# Patient Record
Sex: MALE | Race: WHITE | HISPANIC OR LATINO | URBAN - METROPOLITAN AREA
[De-identification: names, ages, dates, MRNs, and addresses within clinical notes are randomized per-mention and may not be internally consistent; named-entity substitution may affect disease eponyms.]

---

## 2017-10-31 ENCOUNTER — APPOINTMENT (RX ONLY)
Dept: URBAN - METROPOLITAN AREA CLINIC 89 | Facility: CLINIC | Age: 22
Setting detail: DERMATOLOGY
End: 2017-10-31

## 2017-10-31 DIAGNOSIS — R21 RASH AND OTHER NONSPECIFIC SKIN ERUPTION: ICD-10-CM

## 2017-10-31 PROCEDURE — ? COUNSELING

## 2017-10-31 PROCEDURE — 99202 OFFICE O/P NEW SF 15 MIN: CPT

## 2017-10-31 PROCEDURE — ? PRESCRIPTION

## 2017-10-31 PROCEDURE — ? TREATMENT REGIMEN

## 2017-10-31 PROCEDURE — ? SEPARATE AND IDENTIFIABLE DOCUMENTATION

## 2017-10-31 PROCEDURE — ? DEFER

## 2017-10-31 RX ORDER — DOXYCYCLINE 100 MG/1
CAPSULE ORAL
Qty: 60 | Refills: 0 | Status: ERX | COMMUNITY
Start: 2017-10-31

## 2017-10-31 RX ADMIN — DOXYCYCLINE: 100 CAPSULE ORAL at 00:00

## 2017-10-31 ASSESSMENT — LOCATION DETAILED DESCRIPTION DERM: LOCATION DETAILED: LEFT MEDIAL FRONTAL SCALP

## 2017-10-31 ASSESSMENT — LOCATION SIMPLE DESCRIPTION DERM: LOCATION SIMPLE: LEFT SCALP

## 2017-10-31 ASSESSMENT — LOCATION ZONE DERM: LOCATION ZONE: SCALP

## 2017-10-31 NOTE — PROCEDURE: DEFER
Detail Level: Detailed
Instructions (Optional): Vertex, 4mm punch, Firm to Fluctuant Spongy Nodule, R/O Dissecting Cellulitis of Scalp vs Acne Keloidalis Nuchae vs Kerion\\nFrontal Lobe, 4mm punch, Firm to Fluctuant Spongy Nodule, R/O Dissecting Cellulitis of Scalp vs Acne Keloidalis Nuchae vs Kerion, USE TRANSVERSE SECTIONING
Procedure To Be Performed At Next Visit: Biopsy by punch method

## 2017-11-14 ENCOUNTER — APPOINTMENT (RX ONLY)
Dept: URBAN - METROPOLITAN AREA CLINIC 89 | Facility: CLINIC | Age: 22
Setting detail: DERMATOLOGY
End: 2017-11-14

## 2017-11-14 DIAGNOSIS — D485 NEOPLASM OF UNCERTAIN BEHAVIOR OF SKIN: ICD-10-CM

## 2017-11-14 PROBLEM — D48.5 NEOPLASM OF UNCERTAIN BEHAVIOR OF SKIN: Status: ACTIVE | Noted: 2017-11-14

## 2017-11-14 PROCEDURE — ? COUNSELING

## 2017-11-14 PROCEDURE — 11100: CPT

## 2017-11-14 PROCEDURE — 99213 OFFICE O/P EST LOW 20 MIN: CPT | Mod: 25

## 2017-11-14 PROCEDURE — 11101: CPT

## 2017-11-14 PROCEDURE — ? SEPARATE AND IDENTIFIABLE DOCUMENTATION

## 2017-11-14 PROCEDURE — ? BIOPSY BY PUNCH METHOD

## 2017-11-14 ASSESSMENT — LOCATION DETAILED DESCRIPTION DERM
LOCATION DETAILED: POSTERIOR MID-PARIETAL SCALP
LOCATION DETAILED: LEFT SUPERIOR PARIETAL SCALP

## 2017-11-14 ASSESSMENT — LOCATION SIMPLE DESCRIPTION DERM
LOCATION SIMPLE: POSTERIOR SCALP
LOCATION SIMPLE: SCALP

## 2017-11-14 ASSESSMENT — PAIN INTENSITY VAS: HOW INTENSE IS YOUR PAIN 0 BEING NO PAIN, 10 BEING THE MOST SEVERE PAIN POSSIBLE?: NO PAIN

## 2017-11-14 ASSESSMENT — LOCATION ZONE DERM: LOCATION ZONE: SCALP

## 2017-11-14 NOTE — PROCEDURE: BIOPSY BY PUNCH METHOD
Patient Will Remove Sutures At Home?: No
Anesthesia Type: 2% lidocaine without epinephrine
Anesthesia Volume In Cc (Will Not Render If 0): 0.5
Size Of Lesion In Cm (Optional): 0
Suture Removal: 14 days
Post-Care Instructions: I reviewed with the patient in detail post-care instructions. Patient is to keep the biopsy site dry overnight, and then apply bacitracin twice daily until healed. Patient may apply hydrogen peroxide soaks to remove any crusting.
Lab Facility: [address] [place] [address]
Hemostasis: None
Consent: Written consent was obtained and risks were reviewed including but not limited to scarring, infection, bleeding, scabbing, incomplete removal, nerve damage and allergy to anesthesia.
Lab Facility: [id number]
Wound Care: Bacitracin
Epidermal Sutures: 5-0 Nylon
Notification Instructions: Patient will be notified of biopsy results. However, patient instructed to call the office if not contacted within 2 weeks.
Punch Size In Mm: 4
Billing Type: [employer]
Biopsy Type: H and E
Home Suture Removal Text: Patient was provided a home suture removal kit and will remove their sutures at home. If they have any questions or difficulties they will call the office.
Path Notes (To The Dermatopathologist): Firm to Fluctuant Spongy Nodule; R/O- Dissecting cellulitis of Scalp vs Acne Keloidalis Nuchae vs Kerion, USE TRANSVERSE SECTIONING, SIZE: 4mm
Lab: [id number]
Body Location Override (Optional - Billing Will Still Be Based On Selected Body Map Location If Applicable): Vertex
Body Location Override (Optional - Billing Will Still Be Based On Selected Body Map Location If Applicable): Frontal Lobe
Detail Level: Detailed
Home Suture Removal Text: Patient was provided a home suture removal kit and will remove their sutures at home.  If they have any questions or difficulties they will call the office.
Dressing: bandage
Lab: [id number]
Billing Type: Third-Party Bill
Path Notes (To The Dermatopathologist): Firm to Fluctuant Spongy Nodule; R/O- Dissecting cellulitis of Scalp vs Acne Keloidalis Nuchae vs Kerion, SIZE: 4mm

## 2017-11-14 NOTE — PROCEDURE: MIPS QUALITY
Detail Level: Detailed
Quality 431: Preventive Care And Screening: Unhealthy Alcohol Use - Screening: Patient screened for unhealthy alcohol use using a single question and scores less than 2 times per year
Quality 226: Preventive Care And Screening: Tobacco Use: Screening And Cessation Intervention: Patient screened for tobacco and never smoked
Quality 130: Documentation Of Current Medications In The Medical Record: Current Medications Documented
Quality 110: Preventive Care And Screening: Influenza Immunization: Influenza Immunization not Administered for Documented Reasons.

## 2017-11-29 ENCOUNTER — APPOINTMENT (RX ONLY)
Dept: URBAN - METROPOLITAN AREA CLINIC 89 | Facility: CLINIC | Age: 22
Setting detail: DERMATOLOGY
End: 2017-11-29

## 2017-11-29 ENCOUNTER — RX ONLY (OUTPATIENT)
Age: 22
Setting detail: RX ONLY
End: 2017-11-29

## 2017-11-29 DIAGNOSIS — L66.89 OTHER CICATRICIAL ALOPECIA: ICD-10-CM | Status: STABLE

## 2017-11-29 DIAGNOSIS — L66.8 OTHER CICATRICIAL ALOPECIA: ICD-10-CM | Status: STABLE

## 2017-11-29 PROCEDURE — ? TREATMENT REGIMEN

## 2017-11-29 PROCEDURE — ? INTRALESIONAL KENALOG

## 2017-11-29 PROCEDURE — ? COUNSELING

## 2017-11-29 PROCEDURE — 99213 OFFICE O/P EST LOW 20 MIN: CPT | Mod: 25

## 2017-11-29 PROCEDURE — ? PATHOLOGY DISCUSSION

## 2017-11-29 PROCEDURE — 11900 INJECT SKIN LESIONS </W 7: CPT

## 2017-11-29 PROCEDURE — ? SEPARATE AND IDENTIFIABLE DOCUMENTATION

## 2017-11-29 RX ORDER — DOXYCYCLINE 100 MG/1
CAPSULE ORAL
Qty: 60 | Refills: 1 | Status: ERX

## 2017-11-29 ASSESSMENT — LOCATION SIMPLE DESCRIPTION DERM
LOCATION SIMPLE: POSTERIOR SCALP
LOCATION SIMPLE: SCALP

## 2017-11-29 ASSESSMENT — LOCATION ZONE DERM: LOCATION ZONE: SCALP

## 2017-11-29 ASSESSMENT — LOCATION DETAILED DESCRIPTION DERM
LOCATION DETAILED: LEFT SUPERIOR PARIETAL SCALP
LOCATION DETAILED: RIGHT SUPERIOR PARIETAL SCALP
LOCATION DETAILED: POSTERIOR MID-PARIETAL SCALP

## 2017-11-29 NOTE — PROCEDURE: INTRALESIONAL KENALOG
Total Volume Injected (Ccs- Only Use Numbers And Decimals): 1
Include Z78.9 (Other Specified Conditions Influencing Health Status) As An Associated Diagnosis?: No
Expiration Date (Optional): 01/2019
Medical Necessity Clause: This procedure was medically necessary because the lesions that were treated were:
Detail Level: Detailed
X Size Of Lesion In Cm (Optional): 0
Kenalog Preparation: Kenalog
Concentration Of Solution Injected (Mg/Ml): 10.0
Administered By (Optional): Avelino Moreno
Lot # (Optional): [id number]
Consent: The risks of atrophy were reviewed with the patient.

## 2017-11-30 ENCOUNTER — RX ONLY (OUTPATIENT)
Age: 22
Setting detail: RX ONLY
End: 2017-11-30

## 2017-11-30 RX ORDER — CHLORHEXIDINE GLUCONATE 2 G/100ML
SOLUTION TOPICAL
Qty: 1 | Refills: 2 | Status: ERX | COMMUNITY
Start: 2017-11-30

## 2018-01-02 ENCOUNTER — APPOINTMENT (RX ONLY)
Dept: URBAN - METROPOLITAN AREA CLINIC 89 | Facility: CLINIC | Age: 23
Setting detail: DERMATOLOGY
End: 2018-01-02

## 2018-01-02 DIAGNOSIS — L66.8 OTHER CICATRICIAL ALOPECIA: ICD-10-CM

## 2018-01-02 DIAGNOSIS — L70.0 ACNE VULGARIS: ICD-10-CM

## 2018-01-02 DIAGNOSIS — L66.89 OTHER CICATRICIAL ALOPECIA: ICD-10-CM

## 2018-01-02 PROCEDURE — 11901 INJECT SKIN LESIONS >7: CPT

## 2018-01-02 PROCEDURE — ? SEPARATE AND IDENTIFIABLE DOCUMENTATION

## 2018-01-02 PROCEDURE — ? INTRALESIONAL KENALOG

## 2018-01-02 PROCEDURE — ? PRESCRIPTION

## 2018-01-02 PROCEDURE — ? COUNSELING

## 2018-01-02 PROCEDURE — 99213 OFFICE O/P EST LOW 20 MIN: CPT | Mod: 25

## 2018-01-02 RX ORDER — CLINDAMYCIN PHOSPHATE 10 MG/ML
SOLUTION TOPICAL
Qty: 1 | Refills: 1 | Status: ERX | COMMUNITY
Start: 2018-01-02

## 2018-01-02 RX ORDER — DOXYCYCLINE 100 MG/1
CAPSULE ORAL BID
Qty: 60 | Refills: 0 | Status: ERX

## 2018-01-02 RX ORDER — BENZOYL PEROXIDE 50 MG/ML
LIQUID TOPICAL
Qty: 1 | Refills: 1 | Status: ERX | COMMUNITY
Start: 2018-01-02

## 2018-01-02 RX ADMIN — BENZOYL PEROXIDE: 50 LIQUID TOPICAL at 00:00

## 2018-01-02 RX ADMIN — CLINDAMYCIN PHOSPHATE: 10 SOLUTION TOPICAL at 00:00

## 2018-01-02 ASSESSMENT — LOCATION DETAILED DESCRIPTION DERM
LOCATION DETAILED: LEFT CENTRAL PARIETAL SCALP
LOCATION DETAILED: LEFT CENTRAL MALAR CHEEK
LOCATION DETAILED: LEFT CENTRAL FRONTAL SCALP
LOCATION DETAILED: RIGHT MEDIAL FOREHEAD
LOCATION DETAILED: LEFT CENTRAL FRONTAL SCALP
LOCATION DETAILED: RIGHT SUPERIOR PARIETAL SCALP
LOCATION DETAILED: RIGHT LOWER CUTANEOUS LIP
LOCATION DETAILED: LEFT SUPERIOR PARIETAL SCALP
LOCATION DETAILED: RIGHT CENTRAL MALAR CHEEK
LOCATION DETAILED: RIGHT SUPERIOR PARIETAL SCALP
LOCATION DETAILED: LEFT SUPERIOR PARIETAL SCALP
LOCATION DETAILED: LEFT MEDIAL FRONTAL SCALP
LOCATION DETAILED: RIGHT CENTRAL FRONTAL SCALP

## 2018-01-02 ASSESSMENT — LOCATION ZONE DERM
LOCATION ZONE: SCALP
LOCATION ZONE: LIP
LOCATION ZONE: FACE
LOCATION ZONE: SCALP

## 2018-01-02 ASSESSMENT — LOCATION SIMPLE DESCRIPTION DERM
LOCATION SIMPLE: RIGHT LIP
LOCATION SIMPLE: LEFT SCALP
LOCATION SIMPLE: SCALP
LOCATION SIMPLE: RIGHT FOREHEAD
LOCATION SIMPLE: LEFT CHEEK
LOCATION SIMPLE: LEFT SCALP
LOCATION SIMPLE: SCALP
LOCATION SIMPLE: RIGHT SCALP
LOCATION SIMPLE: RIGHT CHEEK

## 2018-01-02 NOTE — PROCEDURE: INTRALESIONAL KENALOG
Administered By (Optional): Robert Bob
Total Volume Injected (Ccs- Only Use Numbers And Decimals): 1.0
X Size Of Lesion In Cm (Optional): 0
Detail Level: Detailed
Medical Necessity Clause: This procedure was medically necessary because the lesions that were treated were:
Concentration Of Solution Injected (Mg/Ml): 10.0
Lot # (Optional): [id number]
Include Z78.9 (Other Specified Conditions Influencing Health Status) As An Associated Diagnosis?: No
Expiration Date (Optional): 02/2019
Kenalog Preparation: Kenalog
Consent: The risks of atrophy were reviewed with the patient.

## 2018-02-14 ENCOUNTER — APPOINTMENT (RX ONLY)
Dept: URBAN - METROPOLITAN AREA CLINIC 89 | Facility: CLINIC | Age: 23
Setting detail: DERMATOLOGY
End: 2018-02-14

## 2018-02-14 ENCOUNTER — RX ONLY (OUTPATIENT)
Age: 23
Setting detail: RX ONLY
End: 2018-02-14

## 2018-02-14 DIAGNOSIS — L66.8 OTHER CICATRICIAL ALOPECIA: ICD-10-CM | Status: IMPROVED

## 2018-02-14 DIAGNOSIS — L70.0 ACNE VULGARIS: ICD-10-CM

## 2018-02-14 DIAGNOSIS — L66.89 OTHER CICATRICIAL ALOPECIA: ICD-10-CM | Status: IMPROVED

## 2018-02-14 PROCEDURE — 11901 INJECT SKIN LESIONS >7: CPT

## 2018-02-14 PROCEDURE — ? TREATMENT REGIMEN

## 2018-02-14 PROCEDURE — ? INTRALESIONAL KENALOG

## 2018-02-14 PROCEDURE — ? COUNSELING

## 2018-02-14 PROCEDURE — ? SEPARATE AND IDENTIFIABLE DOCUMENTATION

## 2018-02-14 PROCEDURE — 99213 OFFICE O/P EST LOW 20 MIN: CPT | Mod: 25

## 2018-02-14 RX ORDER — DOXYCYCLINE 100 MG/1
CAPSULE ORAL BID
Qty: 60 | Refills: 1 | Status: ERX

## 2018-02-14 ASSESSMENT — LOCATION SIMPLE DESCRIPTION DERM
LOCATION SIMPLE: RIGHT FOREHEAD
LOCATION SIMPLE: RIGHT SCALP
LOCATION SIMPLE: LEFT CHEEK
LOCATION SIMPLE: SCALP
LOCATION SIMPLE: POSTERIOR SCALP
LOCATION SIMPLE: SCALP
LOCATION SIMPLE: RIGHT CHEEK
LOCATION SIMPLE: CHIN

## 2018-02-14 ASSESSMENT — LOCATION DETAILED DESCRIPTION DERM
LOCATION DETAILED: RIGHT MEDIAL FOREHEAD
LOCATION DETAILED: LEFT SUPERIOR PARIETAL SCALP
LOCATION DETAILED: LEFT CENTRAL PARIETAL SCALP
LOCATION DETAILED: RIGHT SUPERIOR PARIETAL SCALP
LOCATION DETAILED: LEFT CENTRAL MALAR CHEEK
LOCATION DETAILED: RIGHT CENTRAL FRONTAL SCALP
LOCATION DETAILED: RIGHT INFERIOR CENTRAL MALAR CHEEK
LOCATION DETAILED: LEFT SUPERIOR PARIETAL SCALP
LOCATION DETAILED: RIGHT CENTRAL PARIETAL SCALP
LOCATION DETAILED: POSTERIOR MID-PARIETAL SCALP
LOCATION DETAILED: LEFT CENTRAL PARIETAL SCALP
LOCATION DETAILED: RIGHT CHIN

## 2018-02-14 ASSESSMENT — LOCATION ZONE DERM
LOCATION ZONE: SCALP
LOCATION ZONE: FACE
LOCATION ZONE: SCALP

## 2018-02-14 NOTE — PROCEDURE: TREATMENT REGIMEN
Continue Regimen: Clindamycin 1% topical solution \\nBenzoyl peroxide 5%
Detail Level: Zone
Initiate Treatment: Doxycycline 100mg BID

## 2018-02-14 NOTE — PROCEDURE: COUNSELING
Topical Clindamycin Pregnancy And Lactation Text: This medication is Pregnancy Category B and is considered safe during pregnancy. It is unknown if it is excreted in breast milk.
Isotretinoin Counseling: Patient should get monthly blood tests, not donate blood, not drive at night if vision affected, not share medication, and not undergo elective surgery for 6 months after tx completed. Side effects reviewed, pt to contact office should one occur.
Detail Level: Detailed
Dapsone Counseling: I discussed with the patient the risks of dapsone including but not limited to hemolytic anemia, agranulocytosis, rashes, methemoglobinemia, kidney failure, peripheral neuropathy, headaches, GI upset, and liver toxicity. Patients who start dapsone require monitoring including baseline LFTs and weekly CBCs for the first month, then every month thereafter. The patient verbalized understanding of the proper use and possible adverse effects of dapsone. All of the patient's questions and concerns were addressed.
Birth Control Pills Pregnancy And Lactation Text: This medication should be avoided if pregnant and for the first 30 days post-partum.
Azithromycin Counseling:  I discussed with the patient the risks of azithromycin including but not limited to GI upset, allergic reaction, drug rash, diarrhea, and yeast infections.
High Dose Vitamin A Counseling: Side effects reviewed, pt to contact office should one occur.
Spironolactone Pregnancy And Lactation Text: This medication can cause feminization of the male fetus and should be avoided during pregnancy. The active metabolite is also found in breast milk.
Bactrim Pregnancy And Lactation Text: This medication is Pregnancy Category D and is known to cause fetal risk. It is also excreted in breast milk.
Benzoyl Peroxide Pregnancy And Lactation Text: This medication is Pregnancy Category C. It is unknown if benzoyl peroxide is excreted in breast milk.
Benzoyl Peroxide Counseling: Patient counseled that medicine may cause skin irritation and bleach clothing. In the event of skin irritation, the patient was advised to reduce the amount of the drug applied or use it less frequently. The patient verbalized understanding of the proper use and possible adverse effects of benzoyl peroxide. All of the patient's questions and concerns were addressed.
Tetracycline Pregnancy And Lactation Text: This medication is Pregnancy Category D and not consider safe during pregnancy. It is also excreted in breast milk.
Birth Control Pills Counseling: Birth Control Pill Counseling: I discussed with the patient the potential side effects of OCPs including but not limited to increased risk of stroke, heart attack, thrombophlebitis, deep venous thrombosis, hepatic adenomas, breast changes, GI upset, headaches, and depression. The patient verbalized understanding of the proper use and possible adverse effects of OCPs. All of the patient's questions and concerns were addressed.
Topical Retinoid Pregnancy And Lactation Text: This medication is Pregnancy Category C. It is unknown if this medication is excreted in breast milk.
Topical Sulfur Applications Counseling: Topical Sulfur Counseling: Patient counseled that this medication may cause skin irritation or allergic reactions. In the event of skin irritation, the patient was advised to reduce the amount of the drug applied or use it less frequently. The patient verbalized understanding of the proper use and possible adverse effects of topical sulfur application. All of the patient's questions and concerns were addressed.
Erythromycin Pregnancy And Lactation Text: This medication is Pregnancy Category B and is considered safe during pregnancy. It is also excreted in breast milk.
Doxycycline Counseling:  Patient counseled regarding possible photosensitivity and increased risk for sunburn. Patient instructed to avoid sunlight, if possible. When exposed to sunlight, patients should wear protective clothing, sunglasses, and sunscreen. The patient was instructed to call the office immediately if the following severe adverse effects occur:  hearing changes, easy bruising/bleeding, severe headache, or vision changes. The patient verbalized understanding of the proper use and possible adverse effects of doxycycline. All of the patient's questions and concerns were addressed.
Minocycline Counseling: Patient advised regarding possible photosensitivity and discoloration of the teeth, skin, lips, tongue and gums. Patient instructed to avoid sunlight, if possible. When exposed to sunlight, patients should wear protective clothing, sunglasses, and sunscreen. The patient was instructed to call the office immediately if the following severe adverse effects occur:  hearing changes, easy bruising/bleeding, severe headache, or vision changes. The patient verbalized understanding of the proper use and possible adverse effects of minocycline. All of the patient's questions and concerns were addressed.
Tazorac Counseling:  Patient advised that medication is irritating and drying. Patient may need to apply sparingly and wash off after an hour before eventually leaving it on overnight. The patient verbalized understanding of the proper use and possible adverse effects of tazorac. All of the patient's questions and concerns were addressed.
Tetracycline Counseling: Patient counseled regarding possible photosensitivity and increased risk for sunburn. Patient instructed to avoid sunlight, if possible. When exposed to sunlight, patients should wear protective clothing, sunglasses, and sunscreen. The patient was instructed to call the office immediately if the following severe adverse effects occur:  hearing changes, easy bruising/bleeding, severe headache, or vision changes. The patient verbalized understanding of the proper use and possible adverse effects of tetracycline. All of the patient's questions and concerns were addressed. Patient understands to avoid pregnancy while on therapy due to potential birth defects.
Spironolactone Counseling: Patient advised regarding risks of diarrhea, abdominal pain, hyperkalemia, birth defects (for female patients), liver toxicity and renal toxicity. The patient may need blood work to monitor liver and kidney function and potassium levels while on therapy. The patient verbalized understanding of the proper use and possible adverse effects of spironolactone. All of the patient's questions and concerns were addressed.
Bactrim Counseling:  I discussed with the patient the risks of sulfa antibiotics including but not limited to GI upset, allergic reaction, drug rash, diarrhea, dizziness, photosensitivity, and yeast infections. Rarely, more serious reactions can occur including but not limited to aplastic anemia, agranulocytosis, methemoglobinemia, blood dyscrasias, liver or kidney failure, lung infiltrates or desquamative/blistering drug rashes.
Include Pregnancy/Lactation Warning?: No
Topical Retinoid counseling:  Patient advised to apply a pea-sized amount only at bedtime and wait 30 minutes after washing their face before applying. If too drying, patient may add a non-comedogenic moisturizer. The patient verbalized understanding of the proper use and possible adverse effects of retinoids. All of the patient's questions and concerns were addressed.
High Dose Vitamin A Pregnancy And Lactation Text: High dose vitamin A therapy is contraindicated during pregnancy and breast feeding.
Tazorac Pregnancy And Lactation Text: This medication is not safe during pregnancy. It is unknown if this medication is excreted in breast milk.
Topical Sulfur Applications Pregnancy And Lactation Text: This medication is Pregnancy Category C and has an unknown safety profile during pregnancy. It is unknown if this topical medication is excreted in breast milk.
Dapsone Pregnancy And Lactation Text: This medication is Pregnancy Category C and is not considered safe during pregnancy or breast feeding.
Isotretinoin Pregnancy And Lactation Text: This medication is Pregnancy Category X and is considered extremely dangerous during pregnancy. It is unknown if it is excreted in breast milk.
Doxycycline Pregnancy And Lactation Text: This medication is Pregnancy Category D and not consider safe during pregnancy. It is also excreted in breast milk but is considered safe for shorter treatment courses.
Topical Clindamycin Counseling: Patient counseled that this medication may cause skin irritation or allergic reactions. In the event of skin irritation, the patient was advised to reduce the amount of the drug applied or use it less frequently. The patient verbalized understanding of the proper use and possible adverse effects of clindamycin. All of the patient's questions and concerns were addressed.
Erythromycin Counseling:  I discussed with the patient the risks of erythromycin including but not limited to GI upset, allergic reaction, drug rash, diarrhea, increase in liver enzymes, and yeast infections.
Azithromycin Pregnancy And Lactation Text: This medication is considered safe during pregnancy and is also secreted in breast milk.

## 2018-02-14 NOTE — PROCEDURE: INTRALESIONAL KENALOG
Kenalog Preparation: Kenalog
Include Z78.9 (Other Specified Conditions Influencing Health Status) As An Associated Diagnosis?: No
Medical Necessity Clause: This procedure was medically necessary because the lesions that were treated were:
Concentration Of Solution Injected (Mg/Ml): 10.0
Detail Level: Detailed
Total Volume Injected (Ccs- Only Use Numbers And Decimals): 1.0
Lot # (Optional): [id number]
Consent: The risks of atrophy were reviewed with the patient.
Administered By (Optional): Monisha Olivia
X Size Of Lesion In Cm (Optional): 0
Expiration Date (Optional):

## 2018-05-08 ENCOUNTER — RX ONLY (OUTPATIENT)
Age: 23
Setting detail: RX ONLY
End: 2018-05-08

## 2018-05-08 ENCOUNTER — APPOINTMENT (RX ONLY)
Dept: URBAN - METROPOLITAN AREA CLINIC 54 | Facility: CLINIC | Age: 23
Setting detail: DERMATOLOGY
End: 2018-05-08

## 2018-05-08 DIAGNOSIS — L66.8 OTHER CICATRICIAL ALOPECIA: ICD-10-CM

## 2018-05-08 DIAGNOSIS — L66.89 OTHER CICATRICIAL ALOPECIA: ICD-10-CM

## 2018-05-08 DIAGNOSIS — L70.0 ACNE VULGARIS: ICD-10-CM

## 2018-05-08 PROCEDURE — ? INTRALESIONAL KENALOG

## 2018-05-08 PROCEDURE — 99213 OFFICE O/P EST LOW 20 MIN: CPT | Mod: 25

## 2018-05-08 PROCEDURE — ? COUNSELING

## 2018-05-08 PROCEDURE — ? SEPARATE AND IDENTIFIABLE DOCUMENTATION

## 2018-05-08 PROCEDURE — ? TREATMENT REGIMEN

## 2018-05-08 PROCEDURE — 11901 INJECT SKIN LESIONS >7: CPT

## 2018-05-08 RX ORDER — DOXYCYCLINE 100 MG/1
CAPSULE ORAL BID
Qty: 60 | Refills: 1 | Status: ERX

## 2018-05-08 ASSESSMENT — LOCATION ZONE DERM
LOCATION ZONE: FACE
LOCATION ZONE: SCALP
LOCATION ZONE: SCALP

## 2018-05-08 ASSESSMENT — LOCATION DETAILED DESCRIPTION DERM
LOCATION DETAILED: LEFT CENTRAL PARIETAL SCALP
LOCATION DETAILED: RIGHT SUPERIOR PARIETAL SCALP
LOCATION DETAILED: LEFT CENTRAL FRONTAL SCALP
LOCATION DETAILED: POSTERIOR MID-PARIETAL SCALP
LOCATION DETAILED: LEFT CHIN
LOCATION DETAILED: LEFT INFERIOR CENTRAL MALAR CHEEK
LOCATION DETAILED: LEFT LATERAL FRONTAL SCALP
LOCATION DETAILED: LEFT SUPERIOR PARIETAL SCALP
LOCATION DETAILED: RIGHT SUPERIOR PARIETAL SCALP
LOCATION DETAILED: RIGHT MEDIAL FOREHEAD
LOCATION DETAILED: RIGHT INFERIOR CENTRAL MALAR CHEEK
LOCATION DETAILED: LEFT SUPERIOR PARIETAL SCALP

## 2018-05-08 ASSESSMENT — LOCATION SIMPLE DESCRIPTION DERM
LOCATION SIMPLE: RIGHT CHEEK
LOCATION SIMPLE: SCALP
LOCATION SIMPLE: LEFT CHEEK
LOCATION SIMPLE: CHIN
LOCATION SIMPLE: LEFT SCALP
LOCATION SIMPLE: RIGHT FOREHEAD
LOCATION SIMPLE: POSTERIOR SCALP
LOCATION SIMPLE: SCALP
LOCATION SIMPLE: LEFT SCALP

## 2018-05-08 NOTE — PROCEDURE: COUNSELING
Erythromycin Counseling:  I discussed with the patient the risks of erythromycin including but not limited to GI upset, allergic reaction, drug rash, diarrhea, increase in liver enzymes, and yeast infections.
Erythromycin Pregnancy And Lactation Text: This medication is Pregnancy Category B and is considered safe during pregnancy. It is also excreted in breast milk.
Azithromycin Pregnancy And Lactation Text: This medication is considered safe during pregnancy and is also secreted in breast milk.
Bactrim Counseling:  I discussed with the patient the risks of sulfa antibiotics including but not limited to GI upset, allergic reaction, drug rash, diarrhea, dizziness, photosensitivity, and yeast infections. Rarely, more serious reactions can occur including but not limited to aplastic anemia, agranulocytosis, methemoglobinemia, blood dyscrasias, liver or kidney failure, lung infiltrates or desquamative/blistering drug rashes.
Include Pregnancy/Lactation Warning?: No
Azithromycin Counseling:  I discussed with the patient the risks of azithromycin including but not limited to GI upset, allergic reaction, drug rash, diarrhea, and yeast infections.
Topical Retinoid Pregnancy And Lactation Text: This medication is Pregnancy Category C. It is unknown if this medication is excreted in breast milk.
Doxycycline Counseling:  Patient counseled regarding possible photosensitivity and increased risk for sunburn. Patient instructed to avoid sunlight, if possible. When exposed to sunlight, patients should wear protective clothing, sunglasses, and sunscreen. The patient was instructed to call the office immediately if the following severe adverse effects occur:  hearing changes, easy bruising/bleeding, severe headache, or vision changes. The patient verbalized understanding of the proper use and possible adverse effects of doxycycline. All of the patient's questions and concerns were addressed.
Isotretinoin Counseling: Patient should get monthly blood tests, not donate blood, not drive at night if vision affected, not share medication, and not undergo elective surgery for 6 months after tx completed. Side effects reviewed, pt to contact office should one occur.
Tazorac Pregnancy And Lactation Text: This medication is not safe during pregnancy. It is unknown if this medication is excreted in breast milk.
Minocycline Pregnancy And Lactation Text: This medication is Pregnancy Category D and not consider safe during pregnancy. It is also excreted in breast milk.
Dapsone Counseling: I discussed with the patient the risks of dapsone including but not limited to hemolytic anemia, agranulocytosis, rashes, methemoglobinemia, kidney failure, peripheral neuropathy, headaches, GI upset, and liver toxicity. Patients who start dapsone require monitoring including baseline LFTs and weekly CBCs for the first month, then every month thereafter. The patient verbalized understanding of the proper use and possible adverse effects of dapsone. All of the patient's questions and concerns were addressed.
Benzoyl Peroxide Counseling: Patient counseled that medicine may cause skin irritation and bleach clothing. In the event of skin irritation, the patient was advised to reduce the amount of the drug applied or use it less frequently. The patient verbalized understanding of the proper use and possible adverse effects of benzoyl peroxide. All of the patient's questions and concerns were addressed.
Minocycline Counseling: Patient advised regarding possible photosensitivity and discoloration of the teeth, skin, lips, tongue and gums. Patient instructed to avoid sunlight, if possible. When exposed to sunlight, patients should wear protective clothing, sunglasses, and sunscreen. The patient was instructed to call the office immediately if the following severe adverse effects occur:  hearing changes, easy bruising/bleeding, severe headache, or vision changes. The patient verbalized understanding of the proper use and possible adverse effects of minocycline. All of the patient's questions and concerns were addressed.
Topical Retinoid counseling:  Patient advised to apply a pea-sized amount only at bedtime and wait 30 minutes after washing their face before applying. If too drying, patient may add a non-comedogenic moisturizer. The patient verbalized understanding of the proper use and possible adverse effects of retinoids. All of the patient's questions and concerns were addressed.
Spironolactone Counseling: Patient advised regarding risks of diarrhea, abdominal pain, hyperkalemia, birth defects (for female patients), liver toxicity and renal toxicity. The patient may need blood work to monitor liver and kidney function and potassium levels while on therapy. The patient verbalized understanding of the proper use and possible adverse effects of spironolactone. All of the patient's questions and concerns were addressed.
Benzoyl Peroxide Pregnancy And Lactation Text: This medication is Pregnancy Category C. It is unknown if benzoyl peroxide is excreted in breast milk.
Dapsone Pregnancy And Lactation Text: This medication is Pregnancy Category C and is not considered safe during pregnancy or breast feeding.
Tazorac Counseling:  Patient advised that medication is irritating and drying. Patient may need to apply sparingly and wash off after an hour before eventually leaving it on overnight. The patient verbalized understanding of the proper use and possible adverse effects of tazorac. All of the patient's questions and concerns were addressed.
Doxycycline Pregnancy And Lactation Text: This medication is Pregnancy Category D and not consider safe during pregnancy. It is also excreted in breast milk but is considered safe for shorter treatment courses.
Topical Sulfur Applications Counseling: Topical Sulfur Counseling: Patient counseled that this medication may cause skin irritation or allergic reactions. In the event of skin irritation, the patient was advised to reduce the amount of the drug applied or use it less frequently. The patient verbalized understanding of the proper use and possible adverse effects of topical sulfur application. All of the patient's questions and concerns were addressed.
Topical Clindamycin Pregnancy And Lactation Text: This medication is Pregnancy Category B and is considered safe during pregnancy. It is unknown if it is excreted in breast milk.
Birth Control Pills Counseling: Birth Control Pill Counseling: I discussed with the patient the potential side effects of OCPs including but not limited to increased risk of stroke, heart attack, thrombophlebitis, deep venous thrombosis, hepatic adenomas, breast changes, GI upset, headaches, and depression. The patient verbalized understanding of the proper use and possible adverse effects of OCPs. All of the patient's questions and concerns were addressed.
Isotretinoin Pregnancy And Lactation Text: This medication is Pregnancy Category X and is considered extremely dangerous during pregnancy. It is unknown if it is excreted in breast milk.
High Dose Vitamin A Pregnancy And Lactation Text: High dose vitamin A therapy is contraindicated during pregnancy and breast feeding.
Detail Level: Detailed
Spironolactone Pregnancy And Lactation Text: This medication can cause feminization of the male fetus and should be avoided during pregnancy. The active metabolite is also found in breast milk.
Topical Sulfur Applications Pregnancy And Lactation Text: This medication is Pregnancy Category C and has an unknown safety profile during pregnancy. It is unknown if this topical medication is excreted in breast milk.
Bactrim Pregnancy And Lactation Text: This medication is Pregnancy Category D and is known to cause fetal risk. It is also excreted in breast milk.
Topical Clindamycin Counseling: Patient counseled that this medication may cause skin irritation or allergic reactions. In the event of skin irritation, the patient was advised to reduce the amount of the drug applied or use it less frequently. The patient verbalized understanding of the proper use and possible adverse effects of clindamycin. All of the patient's questions and concerns were addressed.
High Dose Vitamin A Counseling: Side effects reviewed, pt to contact office should one occur.
Tetracycline Counseling: Patient counseled regarding possible photosensitivity and increased risk for sunburn. Patient instructed to avoid sunlight, if possible. When exposed to sunlight, patients should wear protective clothing, sunglasses, and sunscreen. The patient was instructed to call the office immediately if the following severe adverse effects occur:  hearing changes, easy bruising/bleeding, severe headache, or vision changes. The patient verbalized understanding of the proper use and possible adverse effects of tetracycline. All of the patient's questions and concerns were addressed. Patient understands to avoid pregnancy while on therapy due to potential birth defects.
Birth Control Pills Pregnancy And Lactation Text: This medication should be avoided if pregnant and for the first 30 days post-partum.

## 2018-05-08 NOTE — PROCEDURE: INTRALESIONAL KENALOG
Concentration Of Solution Injected (Mg/Ml): 10.0
Kenalog Preparation: Kenalog
Administered By (Optional): Milady Reynaga
Detail Level: Detailed
X Size Of Lesion In Cm (Optional): 0
Include Z78.9 (Other Specified Conditions Influencing Health Status) As An Associated Diagnosis?: No
Expiration Date (Optional): 08/ 2018
Total Volume Injected (Ccs- Only Use Numbers And Decimals): 0.8
Lot # (Optional): [id number]
Consent: The risks of atrophy were reviewed with the patient.
Medical Necessity Clause: This procedure was medically necessary because the lesions that were treated were:

## 2018-06-19 ENCOUNTER — APPOINTMENT (RX ONLY)
Dept: URBAN - METROPOLITAN AREA CLINIC 54 | Facility: CLINIC | Age: 23
Setting detail: DERMATOLOGY
End: 2018-06-19

## 2018-06-19 DIAGNOSIS — L70.0 ACNE VULGARIS: ICD-10-CM

## 2018-06-19 DIAGNOSIS — L66.8 OTHER CICATRICIAL ALOPECIA: ICD-10-CM

## 2018-06-19 DIAGNOSIS — L71.8 OTHER ROSACEA: ICD-10-CM

## 2018-06-19 DIAGNOSIS — L66.89 OTHER CICATRICIAL ALOPECIA: ICD-10-CM

## 2018-06-19 PROCEDURE — ? SEPARATE AND IDENTIFIABLE DOCUMENTATION

## 2018-06-19 PROCEDURE — 99213 OFFICE O/P EST LOW 20 MIN: CPT

## 2018-06-19 PROCEDURE — ? COUNSELING

## 2018-06-19 PROCEDURE — ? PRESCRIPTION

## 2018-06-19 PROCEDURE — ? MEDICAL CONSULTATION: MICRODERMABRASION

## 2018-06-19 PROCEDURE — ? CHEMICAL PEEL JESSNER/TCA

## 2018-06-19 PROCEDURE — ? TREATMENT REGIMEN

## 2018-06-19 RX ORDER — METRONIDAZOLE 7.5 MG/G
CREAM TOPICAL
Qty: 1 | Refills: 2 | Status: ERX | COMMUNITY
Start: 2018-06-19

## 2018-06-19 RX ADMIN — METRONIDAZOLE: 7.5 CREAM TOPICAL at 00:00

## 2018-06-19 ASSESSMENT — LOCATION DETAILED DESCRIPTION DERM
LOCATION DETAILED: SUPERIOR MID FOREHEAD
LOCATION DETAILED: LEFT CENTRAL MALAR CHEEK
LOCATION DETAILED: LEFT SUPERIOR MEDIAL BUCCAL CHEEK
LOCATION DETAILED: LEFT CENTRAL MALAR CHEEK
LOCATION DETAILED: RIGHT SUPERIOR MEDIAL FOREHEAD
LOCATION DETAILED: LEFT INFERIOR CENTRAL MALAR CHEEK
LOCATION DETAILED: LEFT CHIN

## 2018-06-19 ASSESSMENT — LOCATION ZONE DERM
LOCATION ZONE: FACE
LOCATION ZONE: FACE

## 2018-06-19 ASSESSMENT — LOCATION SIMPLE DESCRIPTION DERM
LOCATION SIMPLE: RIGHT FOREHEAD
LOCATION SIMPLE: SUPERIOR FOREHEAD
LOCATION SIMPLE: LEFT CHEEK
LOCATION SIMPLE: LEFT CHEEK
LOCATION SIMPLE: CHIN

## 2018-06-19 NOTE — PROCEDURE: TREATMENT REGIMEN
Continue Regimen: Doxycycline 100mg bid, Clindamycin solution bid benzoyl peroxide 10% bid, sunscreen and moisturizer daily
Detail Level: Zone
Initiate Treatment: Patient would not like k10 injections today, will consider k10 injections at next visit
Plan: Patient instructed to apply metronidazole 0.75% cream QAM

## 2018-06-19 NOTE — PROCEDURE: CHEMICAL PEEL JESSNER/TCA
Post-Care Instructions: I reviewed with the patient in detail post-care instructions. Patient should avoid sun exposure and wear sun protection.
Chemical Peel: 10% TCA
Consent: Prior to the procedure, written consent was obtained and risks were reviewed, including but not limited to: redness, peeling, blistering, pigmentary change, scarring, infection, and pain.
Time (Mins): 2
Erythema: moderate
Treatment Number: 0
Detail Level: Simple
Prep: The treated area was degreased with pre-peel cleanser, and vaseline was applied for protection of mucous membranes.
Post Peel Care: After the procedure, the treatment area was washed with soap and water, and a post-peel cream was applied. Sun protection and post-care instructions were reviewed with the patient.

## 2018-06-19 NOTE — PROCEDURE: COUNSELING
Bactrim Pregnancy And Lactation Text: This medication is Pregnancy Category D and is known to cause fetal risk. It is also excreted in breast milk.
Topical Sulfur Applications Pregnancy And Lactation Text: This medication is Pregnancy Category C and has an unknown safety profile during pregnancy. It is unknown if this topical medication is excreted in breast milk.
Tetracycline Pregnancy And Lactation Text: This medication is Pregnancy Category D and not consider safe during pregnancy. It is also excreted in breast milk.
Doxycycline Counseling:  Patient counseled regarding possible photosensitivity and increased risk for sunburn. Patient instructed to avoid sunlight, if possible. When exposed to sunlight, patients should wear protective clothing, sunglasses, and sunscreen. The patient was instructed to call the office immediately if the following severe adverse effects occur:  hearing changes, easy bruising/bleeding, severe headache, or vision changes. The patient verbalized understanding of the proper use and possible adverse effects of doxycycline. All of the patient's questions and concerns were addressed.
Birth Control Pills Counseling: Birth Control Pill Counseling: I discussed with the patient the potential side effects of OCPs including but not limited to increased risk of stroke, heart attack, thrombophlebitis, deep venous thrombosis, hepatic adenomas, breast changes, GI upset, headaches, and depression. The patient verbalized understanding of the proper use and possible adverse effects of OCPs. All of the patient's questions and concerns were addressed.
Erythromycin Counseling:  I discussed with the patient the risks of erythromycin including but not limited to GI upset, allergic reaction, drug rash, diarrhea, increase in liver enzymes, and yeast infections.
High Dose Vitamin A Pregnancy And Lactation Text: High dose vitamin A therapy is contraindicated during pregnancy and breast feeding.
Spironolactone Pregnancy And Lactation Text: This medication can cause feminization of the male fetus and should be avoided during pregnancy. The active metabolite is also found in breast milk.
Topical Sulfur Applications Counseling: Topical Sulfur Counseling: Patient counseled that this medication may cause skin irritation or allergic reactions. In the event of skin irritation, the patient was advised to reduce the amount of the drug applied or use it less frequently. The patient verbalized understanding of the proper use and possible adverse effects of topical sulfur application. All of the patient's questions and concerns were addressed.
Isotretinoin Pregnancy And Lactation Text: This medication is Pregnancy Category X and is considered extremely dangerous during pregnancy. It is unknown if it is excreted in breast milk.
Topical Clindamycin Pregnancy And Lactation Text: This medication is Pregnancy Category B and is considered safe during pregnancy. It is unknown if it is excreted in breast milk.
Topical Clindamycin Counseling: Patient counseled that this medication may cause skin irritation or allergic reactions. In the event of skin irritation, the patient was advised to reduce the amount of the drug applied or use it less frequently. The patient verbalized understanding of the proper use and possible adverse effects of clindamycin. All of the patient's questions and concerns were addressed.
Birth Control Pills Pregnancy And Lactation Text: This medication should be avoided if pregnant and for the first 30 days post-partum.
Include Pregnancy/Lactation Warning?: No
Isotretinoin Counseling: Patient should get monthly blood tests, not donate blood, not drive at night if vision affected, not share medication, and not undergo elective surgery for 6 months after tx completed. Side effects reviewed, pt to contact office should one occur.
Doxycycline Pregnancy And Lactation Text: This medication is Pregnancy Category D and not consider safe during pregnancy. It is also excreted in breast milk but is considered safe for shorter treatment courses.
Azithromycin Pregnancy And Lactation Text: This medication is considered safe during pregnancy and is also secreted in breast milk.
Erythromycin Pregnancy And Lactation Text: This medication is Pregnancy Category B and is considered safe during pregnancy. It is also excreted in breast milk.
Tazorac Counseling:  Patient advised that medication is irritating and drying. Patient may need to apply sparingly and wash off after an hour before eventually leaving it on overnight. The patient verbalized understanding of the proper use and possible adverse effects of tazorac. All of the patient's questions and concerns were addressed.
Bactrim Counseling:  I discussed with the patient the risks of sulfa antibiotics including but not limited to GI upset, allergic reaction, drug rash, diarrhea, dizziness, photosensitivity, and yeast infections. Rarely, more serious reactions can occur including but not limited to aplastic anemia, agranulocytosis, methemoglobinemia, blood dyscrasias, liver or kidney failure, lung infiltrates or desquamative/blistering drug rashes.
High Dose Vitamin A Counseling: Side effects reviewed, pt to contact office should one occur.
Azithromycin Counseling:  I discussed with the patient the risks of azithromycin including but not limited to GI upset, allergic reaction, drug rash, diarrhea, and yeast infections.
Dapsone Pregnancy And Lactation Text: This medication is Pregnancy Category C and is not considered safe during pregnancy or breast feeding.
Tazorac Pregnancy And Lactation Text: This medication is not safe during pregnancy. It is unknown if this medication is excreted in breast milk.
Topical Retinoid Pregnancy And Lactation Text: This medication is Pregnancy Category C. It is unknown if this medication is excreted in breast milk.
Dapsone Counseling: I discussed with the patient the risks of dapsone including but not limited to hemolytic anemia, agranulocytosis, rashes, methemoglobinemia, kidney failure, peripheral neuropathy, headaches, GI upset, and liver toxicity. Patients who start dapsone require monitoring including baseline LFTs and weekly CBCs for the first month, then every month thereafter. The patient verbalized understanding of the proper use and possible adverse effects of dapsone. All of the patient's questions and concerns were addressed.
Tetracycline Counseling: Patient counseled regarding possible photosensitivity and increased risk for sunburn. Patient instructed to avoid sunlight, if possible. When exposed to sunlight, patients should wear protective clothing, sunglasses, and sunscreen. The patient was instructed to call the office immediately if the following severe adverse effects occur:  hearing changes, easy bruising/bleeding, severe headache, or vision changes. The patient verbalized understanding of the proper use and possible adverse effects of tetracycline. All of the patient's questions and concerns were addressed. Patient understands to avoid pregnancy while on therapy due to potential birth defects.
Topical Retinoid counseling:  Patient advised to apply a pea-sized amount only at bedtime and wait 30 minutes after washing their face before applying. If too drying, patient may add a non-comedogenic moisturizer. The patient verbalized understanding of the proper use and possible adverse effects of retinoids. All of the patient's questions and concerns were addressed.
Benzoyl Peroxide Counseling: Patient counseled that medicine may cause skin irritation and bleach clothing. In the event of skin irritation, the patient was advised to reduce the amount of the drug applied or use it less frequently. The patient verbalized understanding of the proper use and possible adverse effects of benzoyl peroxide. All of the patient's questions and concerns were addressed.
Detail Level: Detailed
Spironolactone Counseling: Patient advised regarding risks of diarrhea, abdominal pain, hyperkalemia, birth defects (for female patients), liver toxicity and renal toxicity. The patient may need blood work to monitor liver and kidney function and potassium levels while on therapy. The patient verbalized understanding of the proper use and possible adverse effects of spironolactone. All of the patient's questions and concerns were addressed.
Benzoyl Peroxide Pregnancy And Lactation Text: This medication is Pregnancy Category C. It is unknown if benzoyl peroxide is excreted in breast milk.
Minocycline Counseling: Patient advised regarding possible photosensitivity and discoloration of the teeth, skin, lips, tongue and gums. Patient instructed to avoid sunlight, if possible. When exposed to sunlight, patients should wear protective clothing, sunglasses, and sunscreen. The patient was instructed to call the office immediately if the following severe adverse effects occur:  hearing changes, easy bruising/bleeding, severe headache, or vision changes. The patient verbalized understanding of the proper use and possible adverse effects of minocycline. All of the patient's questions and concerns were addressed.

## 2018-07-10 ENCOUNTER — APPOINTMENT (RX ONLY)
Dept: URBAN - METROPOLITAN AREA CLINIC 54 | Facility: CLINIC | Age: 23
Setting detail: DERMATOLOGY
End: 2018-07-10

## 2018-07-10 DIAGNOSIS — Z41.9 ENCOUNTER FOR PROCEDURE FOR PURPOSES OTHER THAN REMEDYING HEALTH STATE, UNSPECIFIED: ICD-10-CM

## 2018-07-10 PROCEDURE — ? MICRODERMABRASION

## 2018-07-10 ASSESSMENT — LOCATION ZONE DERM
LOCATION ZONE: LIP
LOCATION ZONE: FACE

## 2018-07-10 ASSESSMENT — LOCATION SIMPLE DESCRIPTION DERM
LOCATION SIMPLE: SUPERIOR FOREHEAD
LOCATION SIMPLE: LEFT LIP
LOCATION SIMPLE: RIGHT CHEEK
LOCATION SIMPLE: LEFT CHEEK

## 2018-07-10 ASSESSMENT — LOCATION DETAILED DESCRIPTION DERM
LOCATION DETAILED: RIGHT CENTRAL MALAR CHEEK
LOCATION DETAILED: SUPERIOR MID FOREHEAD
LOCATION DETAILED: LEFT LOWER CUTANEOUS LIP
LOCATION DETAILED: LEFT CENTRAL MALAR CHEEK

## 2018-07-10 NOTE — PROCEDURE: MICRODERMABRASION
Endpoint: mild erythema
Detail Level: Zone
Post-Care Instructions: I reviewed with the patient in detail post-care instructions. Patient should stay away from the sun and wear sun protection until treated areas are fully healed.
Indication: acne
Wand: Medium
Prep Text: The patients skin was prepped with steam/lactic foam cleanser\\nAfter vacuum performed weekly HA pads/ daily toner / moisture therapy and spf 45 were applied.
Vacuum Pressure: [address]
Number Of Passes: 2
Treatment Number: 0
Consent: Written consent obtained, risks reviewed including but not limited to crusting, scabbing, blistering, scarring, darker or lighter pigmentary change, bruising, and/or incomplete response.

## 2018-08-14 ENCOUNTER — RX ONLY (OUTPATIENT)
Age: 23
Setting detail: RX ONLY
End: 2018-08-14

## 2018-08-14 ENCOUNTER — APPOINTMENT (RX ONLY)
Dept: URBAN - METROPOLITAN AREA CLINIC 54 | Facility: CLINIC | Age: 23
Setting detail: DERMATOLOGY
End: 2018-08-14

## 2018-08-14 DIAGNOSIS — L70.0 ACNE VULGARIS: ICD-10-CM

## 2018-08-14 DIAGNOSIS — L66.8 OTHER CICATRICIAL ALOPECIA: ICD-10-CM

## 2018-08-14 DIAGNOSIS — L66.89 OTHER CICATRICIAL ALOPECIA: ICD-10-CM

## 2018-08-14 PROCEDURE — 99213 OFFICE O/P EST LOW 20 MIN: CPT | Mod: 25

## 2018-08-14 PROCEDURE — ? SEPARATE AND IDENTIFIABLE DOCUMENTATION

## 2018-08-14 PROCEDURE — ? INTRALESIONAL KENALOG

## 2018-08-14 PROCEDURE — ? CHEMICAL PEEL JESSNER/TCA

## 2018-08-14 PROCEDURE — ? COUNSELING

## 2018-08-14 PROCEDURE — ? TREATMENT REGIMEN

## 2018-08-14 PROCEDURE — 11900 INJECT SKIN LESIONS </W 7: CPT

## 2018-08-14 RX ORDER — DOXYCYCLINE 100 MG/1
CAPSULE ORAL BID
Qty: 60 | Refills: 3 | Status: ERX

## 2018-08-14 RX ORDER — CLINDAMYCIN PHOSPHATE 10 MG/ML
SOLUTION TOPICAL
Qty: 1 | Refills: 1 | Status: ERX

## 2018-08-14 RX ORDER — BENZOYL PEROXIDE 50 MG/ML
LIQUID TOPICAL
Qty: 1 | Refills: 1 | Status: ERX

## 2018-08-14 ASSESSMENT — LOCATION ZONE DERM
LOCATION ZONE: SCALP
LOCATION ZONE: FACE

## 2018-08-14 ASSESSMENT — LOCATION SIMPLE DESCRIPTION DERM
LOCATION SIMPLE: LEFT FOREHEAD
LOCATION SIMPLE: SCALP
LOCATION SIMPLE: LEFT SCALP

## 2018-08-14 ASSESSMENT — LOCATION DETAILED DESCRIPTION DERM
LOCATION DETAILED: LEFT MEDIAL FRONTAL SCALP
LOCATION DETAILED: LEFT SUPERIOR FOREHEAD
LOCATION DETAILED: LEFT SUPERIOR PARIETAL SCALP
LOCATION DETAILED: LEFT CENTRAL PARIETAL SCALP

## 2018-08-14 NOTE — PROCEDURE: INTRALESIONAL KENALOG
Total Volume Injected (Ccs- Only Use Numbers And Decimals): .8
X Size Of Lesion In Cm (Optional): 0
Lot # (Optional): [id number]
Detail Level: Detailed
Expiration Date (Optional): [id number]
Concentration Of Solution Injected (Mg/Ml): 10.0
Ndc# For Kenalog Only: [address]-8
Include Z78.9 (Other Specified Conditions Influencing Health Status) As An Associated Diagnosis?: No
Medical Necessity Clause: This procedure was medically necessary because the lesions that were treated were:
Consent: The risks of atrophy were reviewed with the patient.
Administered By (Optional): Serge Eldridge
Kenalog Preparation: Kenalog

## 2018-08-14 NOTE — PROCEDURE: CHEMICAL PEEL JESSNER/TCA
Chemical Peel: 5% TCA
Post Peel Care: After the procedure, the treatment area was washed with soap and water, and a post-peel cream was applied. Sun protection and post-care instructions were reviewed with the patient.
Prep: The treated area was degreased with pre-peel cleanser, and vaseline was applied for protection of mucous membranes.
Post-Care Instructions: I reviewed with the patient in detail post-care instructions. Patient should avoid sun exposure and wear sun protection.
Frost (0,1+,2+,3+,4+): 0
Time (Mins): 2
Consent: Prior to the procedure, written consent was obtained and risks were reviewed, including but not limited to: redness, peeling, blistering, pigmentary change, scarring, infection, and pain.
Detail Level: Simple

## 2018-08-14 NOTE — PROCEDURE: COUNSELING
Detail Level: Detailed
Birth Control Pills Pregnancy And Lactation Text: This medication should be avoided if pregnant and for the first 30 days post-partum.
Tazorac Counseling:  Patient advised that medication is irritating and drying. Patient may need to apply sparingly and wash off after an hour before eventually leaving it on overnight. The patient verbalized understanding of the proper use and possible adverse effects of tazorac. All of the patient's questions and concerns were addressed.
Isotretinoin Counseling: Patient should get monthly blood tests, not donate blood, not drive at night if vision affected, not share medication, and not undergo elective surgery for 6 months after tx completed. Side effects reviewed, pt to contact office should one occur.
Tazorac Pregnancy And Lactation Text: This medication is not safe during pregnancy. It is unknown if this medication is excreted in breast milk.
Tetracycline Counseling: Patient counseled regarding possible photosensitivity and increased risk for sunburn. Patient instructed to avoid sunlight, if possible. When exposed to sunlight, patients should wear protective clothing, sunglasses, and sunscreen. The patient was instructed to call the office immediately if the following severe adverse effects occur:  hearing changes, easy bruising/bleeding, severe headache, or vision changes. The patient verbalized understanding of the proper use and possible adverse effects of tetracycline. All of the patient's questions and concerns were addressed. Patient understands to avoid pregnancy while on therapy due to potential birth defects.
Topical Clindamycin Counseling: Patient counseled that this medication may cause skin irritation or allergic reactions. In the event of skin irritation, the patient was advised to reduce the amount of the drug applied or use it less frequently. The patient verbalized understanding of the proper use and possible adverse effects of clindamycin. All of the patient's questions and concerns were addressed.
Topical Clindamycin Pregnancy And Lactation Text: This medication is Pregnancy Category B and is considered safe during pregnancy. It is unknown if it is excreted in breast milk.
Birth Control Pills Counseling: Birth Control Pill Counseling: I discussed with the patient the potential side effects of OCPs including but not limited to increased risk of stroke, heart attack, thrombophlebitis, deep venous thrombosis, hepatic adenomas, breast changes, GI upset, headaches, and depression. The patient verbalized understanding of the proper use and possible adverse effects of OCPs. All of the patient's questions and concerns were addressed.
High Dose Vitamin A Pregnancy And Lactation Text: High dose vitamin A therapy is contraindicated during pregnancy and breast feeding.
Erythromycin Counseling:  I discussed with the patient the risks of erythromycin including but not limited to GI upset, allergic reaction, drug rash, diarrhea, increase in liver enzymes, and yeast infections.
Minocycline Pregnancy And Lactation Text: This medication is Pregnancy Category D and not consider safe during pregnancy. It is also excreted in breast milk.
Topical Sulfur Applications Pregnancy And Lactation Text: This medication is Pregnancy Category C and has an unknown safety profile during pregnancy. It is unknown if this topical medication is excreted in breast milk.
Dapsone Pregnancy And Lactation Text: This medication is Pregnancy Category C and is not considered safe during pregnancy or breast feeding.
Benzoyl Peroxide Counseling: Patient counseled that medicine may cause skin irritation and bleach clothing. In the event of skin irritation, the patient was advised to reduce the amount of the drug applied or use it less frequently. The patient verbalized understanding of the proper use and possible adverse effects of benzoyl peroxide. All of the patient's questions and concerns were addressed.
Bactrim Counseling:  I discussed with the patient the risks of sulfa antibiotics including but not limited to GI upset, allergic reaction, drug rash, diarrhea, dizziness, photosensitivity, and yeast infections. Rarely, more serious reactions can occur including but not limited to aplastic anemia, agranulocytosis, methemoglobinemia, blood dyscrasias, liver or kidney failure, lung infiltrates or desquamative/blistering drug rashes.
Minocycline Counseling: Patient advised regarding possible photosensitivity and discoloration of the teeth, skin, lips, tongue and gums. Patient instructed to avoid sunlight, if possible. When exposed to sunlight, patients should wear protective clothing, sunglasses, and sunscreen. The patient was instructed to call the office immediately if the following severe adverse effects occur:  hearing changes, easy bruising/bleeding, severe headache, or vision changes. The patient verbalized understanding of the proper use and possible adverse effects of minocycline. All of the patient's questions and concerns were addressed.
Spironolactone Counseling: Patient advised regarding risks of diarrhea, abdominal pain, hyperkalemia, birth defects (for female patients), liver toxicity and renal toxicity. The patient may need blood work to monitor liver and kidney function and potassium levels while on therapy. The patient verbalized understanding of the proper use and possible adverse effects of spironolactone. All of the patient's questions and concerns were addressed.
Topical Sulfur Applications Counseling: Topical Sulfur Counseling: Patient counseled that this medication may cause skin irritation or allergic reactions. In the event of skin irritation, the patient was advised to reduce the amount of the drug applied or use it less frequently. The patient verbalized understanding of the proper use and possible adverse effects of topical sulfur application. All of the patient's questions and concerns were addressed.
Isotretinoin Pregnancy And Lactation Text: This medication is Pregnancy Category X and is considered extremely dangerous during pregnancy. It is unknown if it is excreted in breast milk.
Dapsone Counseling: I discussed with the patient the risks of dapsone including but not limited to hemolytic anemia, agranulocytosis, rashes, methemoglobinemia, kidney failure, peripheral neuropathy, headaches, GI upset, and liver toxicity. Patients who start dapsone require monitoring including baseline LFTs and weekly CBCs for the first month, then every month thereafter. The patient verbalized understanding of the proper use and possible adverse effects of dapsone. All of the patient's questions and concerns were addressed.
Benzoyl Peroxide Pregnancy And Lactation Text: This medication is Pregnancy Category C. It is unknown if benzoyl peroxide is excreted in breast milk.
High Dose Vitamin A Counseling: Side effects reviewed, pt to contact office should one occur.
Azithromycin Pregnancy And Lactation Text: This medication is considered safe during pregnancy and is also secreted in breast milk.
Doxycycline Pregnancy And Lactation Text: This medication is Pregnancy Category D and not consider safe during pregnancy. It is also excreted in breast milk but is considered safe for shorter treatment courses.
Erythromycin Pregnancy And Lactation Text: This medication is Pregnancy Category B and is considered safe during pregnancy. It is also excreted in breast milk.
Topical Retinoid counseling:  Patient advised to apply a pea-sized amount only at bedtime and wait 30 minutes after washing their face before applying. If too drying, patient may add a non-comedogenic moisturizer. The patient verbalized understanding of the proper use and possible adverse effects of retinoids. All of the patient's questions and concerns were addressed.
Topical Retinoid Pregnancy And Lactation Text: This medication is Pregnancy Category C. It is unknown if this medication is excreted in breast milk.
Include Pregnancy/Lactation Warning?: No
Azithromycin Counseling:  I discussed with the patient the risks of azithromycin including but not limited to GI upset, allergic reaction, drug rash, diarrhea, and yeast infections.
Bactrim Pregnancy And Lactation Text: This medication is Pregnancy Category D and is known to cause fetal risk. It is also excreted in breast milk.
Spironolactone Pregnancy And Lactation Text: This medication can cause feminization of the male fetus and should be avoided during pregnancy. The active metabolite is also found in breast milk.
Doxycycline Counseling:  Patient counseled regarding possible photosensitivity and increased risk for sunburn. Patient instructed to avoid sunlight, if possible. When exposed to sunlight, patients should wear protective clothing, sunglasses, and sunscreen. The patient was instructed to call the office immediately if the following severe adverse effects occur:  hearing changes, easy bruising/bleeding, severe headache, or vision changes. The patient verbalized understanding of the proper use and possible adverse effects of doxycycline. All of the patient's questions and concerns were addressed.

## 2018-08-14 NOTE — PROCEDURE: TREATMENT REGIMEN
Continue Regimen: Clindamycin solution BID, doxycycline 100mg BID, benzoyl Peroxide 10% BID
Detail Level: Zone

## 2018-08-28 ENCOUNTER — APPOINTMENT (RX ONLY)
Dept: URBAN - METROPOLITAN AREA CLINIC 54 | Facility: CLINIC | Age: 23
Setting detail: DERMATOLOGY
End: 2018-08-28

## 2018-08-28 DIAGNOSIS — L70.0 ACNE VULGARIS: ICD-10-CM

## 2018-08-28 PROCEDURE — ? ACNE SURGERY

## 2018-08-28 PROCEDURE — ? MICRODERMABRASION

## 2018-08-28 PROCEDURE — 10040 EXTRACTION: CPT

## 2018-08-28 ASSESSMENT — LOCATION DETAILED DESCRIPTION DERM
LOCATION DETAILED: RIGHT CENTRAL MALAR CHEEK
LOCATION DETAILED: LEFT CENTRAL MALAR CHEEK
LOCATION DETAILED: RIGHT MEDIAL FOREHEAD
LOCATION DETAILED: LEFT CHIN

## 2018-08-28 ASSESSMENT — LOCATION SIMPLE DESCRIPTION DERM
LOCATION SIMPLE: RIGHT CHEEK
LOCATION SIMPLE: LEFT CHEEK
LOCATION SIMPLE: CHIN
LOCATION SIMPLE: RIGHT FOREHEAD

## 2018-08-28 ASSESSMENT — LOCATION ZONE DERM: LOCATION ZONE: FACE

## 2018-08-28 NOTE — PROCEDURE: MICRODERMABRASION
Wand: Medium
Price (Use Numbers Only, No Special Characters Or $): [address] [facility]
Consent: Written consent obtained, risks reviewed including but not limited to crusting, scabbing, blistering, scarring, darker or lighter pigmentary change, bruising, and/or incomplete response.
Detail Level: Zone
Indication: acne
Endpoint: mild erythema
Post-Care Instructions: I reviewed with the patient in detail post-care instructions. Patient should stay away from the sun and wear sun protection until treated areas are fully healed.
Vacuum Pressure: [address]
Treatment Number: 0
Prep Text: The patient's skin was cleaned and prepped with steam and Lactifoam cleanser. After vacuum was performed weekly HA pads/ daily toner / moisture therapy and spf 45 were applied.
Number Of Passes: 2

## 2018-08-28 NOTE — PROCEDURE: ACNE SURGERY
Consent was obtained and risks were reviewed including but not limited to scarring, infection, bleeding, scabbing, incomplete removal, and allergy to anesthesia.
Render Number Of Lesions Treated: no
Prep Text (Optional): Prior to extraction skin of the face was cleansed under steam using lactifoam cleanser
Render Post-Care Instructions In Note?: yes
Detail Level: Zone
Extraction Method: 18 gauge needle, cotton-tipped applicators and gentle lateral pressure
Acne Type: Comedonal Lesions
Hemostasis: Pressure
Post-Care Instructions: I reviewed with the patient in detail post-care instructions. Patient is to keep the treatment areas dry overnight, and then apply bacitracin twice daily until healed. Patient may apply hydrogen peroxide soaks to remove any crusting.

## 2018-09-11 ENCOUNTER — RX ONLY (OUTPATIENT)
Age: 23
Setting detail: RX ONLY
End: 2018-09-11

## 2018-09-11 ENCOUNTER — APPOINTMENT (RX ONLY)
Dept: URBAN - METROPOLITAN AREA CLINIC 54 | Facility: CLINIC | Age: 23
Setting detail: DERMATOLOGY
End: 2018-09-11

## 2018-09-11 DIAGNOSIS — L66.89 OTHER CICATRICIAL ALOPECIA: ICD-10-CM | Status: IMPROVED

## 2018-09-11 DIAGNOSIS — L90.5 SCAR CONDITIONS AND FIBROSIS OF SKIN: ICD-10-CM

## 2018-09-11 DIAGNOSIS — L70.0 ACNE VULGARIS: ICD-10-CM | Status: IMPROVED

## 2018-09-11 DIAGNOSIS — L66.8 OTHER CICATRICIAL ALOPECIA: ICD-10-CM | Status: IMPROVED

## 2018-09-11 PROCEDURE — ? PRESCRIPTION

## 2018-09-11 PROCEDURE — 11900 INJECT SKIN LESIONS </W 7: CPT

## 2018-09-11 PROCEDURE — 99213 OFFICE O/P EST LOW 20 MIN: CPT | Mod: 25

## 2018-09-11 PROCEDURE — ? COUNSELING

## 2018-09-11 PROCEDURE — ? INTRALESIONAL KENALOG

## 2018-09-11 PROCEDURE — ? TREATMENT REGIMEN

## 2018-09-11 PROCEDURE — ? CHEMICAL PEEL JESSNER/TCA

## 2018-09-11 RX ORDER — CLINDAMYCIN PHOSPHATE 10 MG/ML
SOLUTION TOPICAL
Qty: 1 | Refills: 2 | Status: ERX

## 2018-09-11 RX ORDER — SODIUM SULFACETAMIDE 100 MG/ML
LOTION TOPICAL
Qty: 1 | Refills: 2 | Status: ERX | COMMUNITY
Start: 2018-09-11

## 2018-09-11 RX ADMIN — SODIUM SULFACETAMIDE: 100 LOTION TOPICAL at 00:00

## 2018-09-11 ASSESSMENT — LOCATION SIMPLE DESCRIPTION DERM
LOCATION SIMPLE: LEFT CHEEK
LOCATION SIMPLE: RIGHT FOREHEAD
LOCATION SIMPLE: NOSE
LOCATION SIMPLE: RIGHT CHEEK
LOCATION SIMPLE: RIGHT LIP
LOCATION SIMPLE: LEFT SCALP
LOCATION SIMPLE: SUPERIOR FOREHEAD

## 2018-09-11 ASSESSMENT — LOCATION DETAILED DESCRIPTION DERM
LOCATION DETAILED: RIGHT LOWER CUTANEOUS LIP
LOCATION DETAILED: LEFT CENTRAL FRONTAL SCALP
LOCATION DETAILED: NASAL SUPRATIP
LOCATION DETAILED: LEFT CENTRAL MALAR CHEEK
LOCATION DETAILED: RIGHT MEDIAL FOREHEAD
LOCATION DETAILED: SUPERIOR MID FOREHEAD
LOCATION DETAILED: RIGHT CENTRAL MALAR CHEEK

## 2018-09-11 ASSESSMENT — LOCATION ZONE DERM
LOCATION ZONE: LIP
LOCATION ZONE: SCALP
LOCATION ZONE: NOSE
LOCATION ZONE: FACE

## 2018-09-11 NOTE — PROCEDURE: CHEMICAL PEEL JESSNER/TCA
Post-Care Instructions: I reviewed with the patient in detail post-care instructions. Patient should avoid sun exposure and wear sun protection.
Consent: Prior to the procedure, written consent was obtained and risks were reviewed, including but not limited to: redness, peeling, blistering, pigmentary change, scarring, infection, and pain.
Post Peel Care: After the procedure, the treatment area was washed with soap and water, and a post-peel cream was applied. Sun protection and post-care instructions were reviewed with the patient.
Detail Level: Simple
Prep: The treated area was degreased with pre-peel cleanser, and vaseline was applied for protection of mucous membranes.
Erythema: moderate
Number Of Coats: 3
Chemical Peel: 15% TCA
Time (Mins): 2
Frost (0,1+,2+,3+,4+): 3+
Treatment Number: 0

## 2018-09-11 NOTE — PROCEDURE: TREATMENT REGIMEN
Initiate Treatment: Sulfacetamide lotion twice daily
Continue Regimen: Clindamycin 1% solution twice daily \\nDoxycycline 100mg twice daily after meal \\nBenzoyl peroxide 10% cleanser twice daily
Detail Level: Zone

## 2018-09-11 NOTE — PROCEDURE: INTRALESIONAL KENALOG
Kenalog Preparation: Kenalog
Include Z78.9 (Other Specified Conditions Influencing Health Status) As An Associated Diagnosis?: No
Medical Necessity Clause: This procedure was medically necessary because the lesions that were treated were:
Administered By (Optional): David Zambrano
Concentration Of Solution Injected (Mg/Ml): 40.0
Detail Level: Detailed
Total Volume Injected (Ccs- Only Use Numbers And Decimals): 0.5
Ndc# For Kenalog Only: [address]-5
Consent: The risks of atrophy were reviewed with the patient.
X Size Of Lesion In Cm (Optional): 0

## 2018-09-11 NOTE — PROCEDURE: COUNSELING
Isotretinoin Pregnancy And Lactation Text: This medication is Pregnancy Category X and is considered extremely dangerous during pregnancy. It is unknown if it is excreted in breast milk.
Bactrim Pregnancy And Lactation Text: This medication is Pregnancy Category D and is known to cause fetal risk. It is also excreted in breast milk.
Topical Sulfur Applications Pregnancy And Lactation Text: This medication is Pregnancy Category C and has an unknown safety profile during pregnancy. It is unknown if this topical medication is excreted in breast milk.
Birth Control Pills Counseling: Birth Control Pill Counseling: I discussed with the patient the potential side effects of OCPs including but not limited to increased risk of stroke, heart attack, thrombophlebitis, deep venous thrombosis, hepatic adenomas, breast changes, GI upset, headaches, and depression. The patient verbalized understanding of the proper use and possible adverse effects of OCPs. All of the patient's questions and concerns were addressed.
Benzoyl Peroxide Pregnancy And Lactation Text: This medication is Pregnancy Category C. It is unknown if benzoyl peroxide is excreted in breast milk.
Include Pregnancy/Lactation Warning?: No
Minocycline Counseling: Patient advised regarding possible photosensitivity and discoloration of the teeth, skin, lips, tongue and gums. Patient instructed to avoid sunlight, if possible. When exposed to sunlight, patients should wear protective clothing, sunglasses, and sunscreen. The patient was instructed to call the office immediately if the following severe adverse effects occur:  hearing changes, easy bruising/bleeding, severe headache, or vision changes. The patient verbalized understanding of the proper use and possible adverse effects of minocycline. All of the patient's questions and concerns were addressed.
Tetracycline Pregnancy And Lactation Text: This medication is Pregnancy Category D and not consider safe during pregnancy. It is also excreted in breast milk.
Topical Clindamycin Counseling: Patient counseled that this medication may cause skin irritation or allergic reactions. In the event of skin irritation, the patient was advised to reduce the amount of the drug applied or use it less frequently. The patient verbalized understanding of the proper use and possible adverse effects of clindamycin. All of the patient's questions and concerns were addressed.
High Dose Vitamin A Pregnancy And Lactation Text: High dose vitamin A therapy is contraindicated during pregnancy and breast feeding.
Tetracycline Counseling: Patient counseled regarding possible photosensitivity and increased risk for sunburn. Patient instructed to avoid sunlight, if possible. When exposed to sunlight, patients should wear protective clothing, sunglasses, and sunscreen. The patient was instructed to call the office immediately if the following severe adverse effects occur:  hearing changes, easy bruising/bleeding, severe headache, or vision changes. The patient verbalized understanding of the proper use and possible adverse effects of tetracycline. All of the patient's questions and concerns were addressed. Patient understands to avoid pregnancy while on therapy due to potential birth defects.
Spironolactone Counseling: Patient advised regarding risks of diarrhea, abdominal pain, hyperkalemia, birth defects (for female patients), liver toxicity and renal toxicity. The patient may need blood work to monitor liver and kidney function and potassium levels while on therapy. The patient verbalized understanding of the proper use and possible adverse effects of spironolactone. All of the patient's questions and concerns were addressed.
Isotretinoin Counseling: Patient should get monthly blood tests, not donate blood, not drive at night if vision affected, not share medication, and not undergo elective surgery for 6 months after tx completed. Side effects reviewed, pt to contact office should one occur.
Erythromycin Pregnancy And Lactation Text: This medication is Pregnancy Category B and is considered safe during pregnancy. It is also excreted in breast milk.
Tazorac Pregnancy And Lactation Text: This medication is not safe during pregnancy. It is unknown if this medication is excreted in breast milk.
Topical Sulfur Applications Counseling: Topical Sulfur Counseling: Patient counseled that this medication may cause skin irritation or allergic reactions. In the event of skin irritation, the patient was advised to reduce the amount of the drug applied or use it less frequently. The patient verbalized understanding of the proper use and possible adverse effects of topical sulfur application. All of the patient's questions and concerns were addressed.
Azithromycin Pregnancy And Lactation Text: This medication is considered safe during pregnancy and is also secreted in breast milk.
Topical Retinoid Pregnancy And Lactation Text: This medication is Pregnancy Category C. It is unknown if this medication is excreted in breast milk.
Detail Level: Detailed
Bactrim Counseling:  I discussed with the patient the risks of sulfa antibiotics including but not limited to GI upset, allergic reaction, drug rash, diarrhea, dizziness, photosensitivity, and yeast infections. Rarely, more serious reactions can occur including but not limited to aplastic anemia, agranulocytosis, methemoglobinemia, blood dyscrasias, liver or kidney failure, lung infiltrates or desquamative/blistering drug rashes.
Tazorac Counseling:  Patient advised that medication is irritating and drying. Patient may need to apply sparingly and wash off after an hour before eventually leaving it on overnight. The patient verbalized understanding of the proper use and possible adverse effects of tazorac. All of the patient's questions and concerns were addressed.
Doxycycline Pregnancy And Lactation Text: This medication is Pregnancy Category D and not consider safe during pregnancy. It is also excreted in breast milk but is considered safe for shorter treatment courses.
Doxycycline Counseling:  Patient counseled regarding possible photosensitivity and increased risk for sunburn. Patient instructed to avoid sunlight, if possible. When exposed to sunlight, patients should wear protective clothing, sunglasses, and sunscreen. The patient was instructed to call the office immediately if the following severe adverse effects occur:  hearing changes, easy bruising/bleeding, severe headache, or vision changes. The patient verbalized understanding of the proper use and possible adverse effects of doxycycline. All of the patient's questions and concerns were addressed.
Erythromycin Counseling:  I discussed with the patient the risks of erythromycin including but not limited to GI upset, allergic reaction, drug rash, diarrhea, increase in liver enzymes, and yeast infections.
Benzoyl Peroxide Counseling: Patient counseled that medicine may cause skin irritation and bleach clothing. In the event of skin irritation, the patient was advised to reduce the amount of the drug applied or use it less frequently. The patient verbalized understanding of the proper use and possible adverse effects of benzoyl peroxide. All of the patient's questions and concerns were addressed.
Topical Retinoid counseling:  Patient advised to apply a pea-sized amount only at bedtime and wait 30 minutes after washing their face before applying. If too drying, patient may add a non-comedogenic moisturizer. The patient verbalized understanding of the proper use and possible adverse effects of retinoids. All of the patient's questions and concerns were addressed.
Topical Clindamycin Pregnancy And Lactation Text: This medication is Pregnancy Category B and is considered safe during pregnancy. It is unknown if it is excreted in breast milk.
Birth Control Pills Pregnancy And Lactation Text: This medication should be avoided if pregnant and for the first 30 days post-partum.
High Dose Vitamin A Counseling: Side effects reviewed, pt to contact office should one occur.
Dapsone Pregnancy And Lactation Text: This medication is Pregnancy Category C and is not considered safe during pregnancy or breast feeding.
Spironolactone Pregnancy And Lactation Text: This medication can cause feminization of the male fetus and should be avoided during pregnancy. The active metabolite is also found in breast milk.
Dapsone Counseling: I discussed with the patient the risks of dapsone including but not limited to hemolytic anemia, agranulocytosis, rashes, methemoglobinemia, kidney failure, peripheral neuropathy, headaches, GI upset, and liver toxicity. Patients who start dapsone require monitoring including baseline LFTs and weekly CBCs for the first month, then every month thereafter. The patient verbalized understanding of the proper use and possible adverse effects of dapsone. All of the patient's questions and concerns were addressed.
Azithromycin Counseling:  I discussed with the patient the risks of azithromycin including but not limited to GI upset, allergic reaction, drug rash, diarrhea, and yeast infections.

## 2018-09-19 ENCOUNTER — RX ONLY (OUTPATIENT)
Age: 23
Setting detail: RX ONLY
End: 2018-09-19

## 2018-09-19 RX ORDER — BENZOYL PEROXIDE 50 MG/ML
LIQUID TOPICAL
Qty: 1 | Refills: 1 | Status: ERX

## 2018-09-25 ENCOUNTER — APPOINTMENT (RX ONLY)
Dept: URBAN - METROPOLITAN AREA CLINIC 54 | Facility: CLINIC | Age: 23
Setting detail: DERMATOLOGY
End: 2018-09-25

## 2018-10-11 ENCOUNTER — APPOINTMENT (RX ONLY)
Dept: URBAN - METROPOLITAN AREA CLINIC 54 | Facility: CLINIC | Age: 23
Setting detail: DERMATOLOGY
End: 2018-10-11

## 2018-10-11 DIAGNOSIS — L66.89 OTHER CICATRICIAL ALOPECIA: ICD-10-CM

## 2018-10-11 DIAGNOSIS — L66.8 OTHER CICATRICIAL ALOPECIA: ICD-10-CM

## 2018-10-11 DIAGNOSIS — Z41.9 ENCOUNTER FOR PROCEDURE FOR PURPOSES OTHER THAN REMEDYING HEALTH STATE, UNSPECIFIED: ICD-10-CM

## 2018-10-11 DIAGNOSIS — L70.0 ACNE VULGARIS: ICD-10-CM

## 2018-10-11 PROCEDURE — ? COUNSELING

## 2018-10-11 PROCEDURE — ? TREATMENT REGIMEN

## 2018-10-11 PROCEDURE — ? INTRALESIONAL KENALOG

## 2018-10-11 PROCEDURE — ? CHEMICAL PEEL JESSNER/TCA

## 2018-10-11 PROCEDURE — 99213 OFFICE O/P EST LOW 20 MIN: CPT | Mod: 25

## 2018-10-11 PROCEDURE — ? OTHER

## 2018-10-11 PROCEDURE — 11900 INJECT SKIN LESIONS </W 7: CPT

## 2018-10-11 ASSESSMENT — LOCATION SIMPLE DESCRIPTION DERM
LOCATION SIMPLE: SUPERIOR FOREHEAD
LOCATION SIMPLE: SCALP
LOCATION SIMPLE: LEFT SCALP
LOCATION SIMPLE: RIGHT FOREHEAD
LOCATION SIMPLE: RIGHT NOSE
LOCATION SIMPLE: LEFT CHEEK
LOCATION SIMPLE: RIGHT SCALP
LOCATION SIMPLE: LEFT FOREHEAD
LOCATION SIMPLE: CHIN
LOCATION SIMPLE: RIGHT LIP
LOCATION SIMPLE: NOSE
LOCATION SIMPLE: RIGHT CHEEK
LOCATION SIMPLE: SCALP
LOCATION SIMPLE: LEFT SCALP

## 2018-10-11 ASSESSMENT — LOCATION DETAILED DESCRIPTION DERM
LOCATION DETAILED: NASAL DORSUM
LOCATION DETAILED: RIGHT SUPERIOR CENTRAL MALAR CHEEK
LOCATION DETAILED: LEFT SUPERIOR PARIETAL SCALP
LOCATION DETAILED: LEFT INFERIOR CENTRAL MALAR CHEEK
LOCATION DETAILED: RIGHT CENTRAL MALAR CHEEK
LOCATION DETAILED: RIGHT SUPERIOR FOREHEAD
LOCATION DETAILED: LEFT FOREHEAD
LOCATION DETAILED: RIGHT MEDIAL FRONTAL SCALP
LOCATION DETAILED: LEFT CENTRAL MALAR CHEEK
LOCATION DETAILED: RIGHT INFERIOR VERMILION LIP
LOCATION DETAILED: RIGHT NASAL SIDEWALL
LOCATION DETAILED: LEFT SUPERIOR PARIETAL SCALP
LOCATION DETAILED: LEFT MEDIAL FRONTAL SCALP
LOCATION DETAILED: SUPERIOR MID FOREHEAD
LOCATION DETAILED: LEFT CENTRAL FRONTAL SCALP
LOCATION DETAILED: RIGHT CHIN
LOCATION DETAILED: RIGHT SUPERIOR PARIETAL SCALP
LOCATION DETAILED: LEFT CENTRAL FRONTAL SCALP
LOCATION DETAILED: LEFT CENTRAL PARIETAL SCALP

## 2018-10-11 ASSESSMENT — LOCATION ZONE DERM
LOCATION ZONE: FACE
LOCATION ZONE: SCALP
LOCATION ZONE: LIP
LOCATION ZONE: NOSE
LOCATION ZONE: SCALP

## 2018-10-11 NOTE — PROCEDURE: CHEMICAL PEEL JESSNER/TCA
Prep: Patientâs face was cleaned thoroughly before applying the peel
Detail Level: Detailed
Number Of Coats: 2
Post Peel Care: After the procedure, the treatment area was washed with soap and water, and a post-peel cream was applied. Sun protection and post-care instructions were reviewed with the patient.
Frost (0,1+,2+,3+,4+): 1+
Treatment Number: 0
Consent: Prior to the procedure, written consent was obtained and risks were reviewed, including but not limited to: redness, peeling, blistering, pigmentary change, scarring, infection, and pain.
Post-Care Instructions: I reviewed with the patient in detail post-care instructions. Patient should avoid sun exposure and wear sun protection.
Erythema: mild
Chemical Peel: 15% TCA

## 2018-10-11 NOTE — PROCEDURE: TREATMENT REGIMEN
Detail Level: Zone
Continue Regimen: Clindamycin 10% Apply to face Twice a day\\nDoxycycline 100mg take one Twice a day after meals\\nBenzoyl peroxide 10% apply to face Twice a day\\nSulfacetamide Apply to face Twice a day

## 2018-10-11 NOTE — PROCEDURE: INTRALESIONAL KENALOG
Treatment Number (Optional): 4
Consent: The risks of atrophy were reviewed with the patient.
Include Z78.9 (Other Specified Conditions Influencing Health Status) As An Associated Diagnosis?: No
Concentration Of Solution Injected (Mg/Ml): 40.0
Total Volume Injected (Ccs- Only Use Numbers And Decimals): 0.5
Administered By (Optional): Lobo Madsen
Detail Level: Detailed
X Size Of Lesion In Cm (Optional): 0
Ndc# For Kenalog Only: 4350-6051-17
Medical Necessity Clause: This procedure was medically necessary because the lesions that were treated were:
Kenalog Preparation: Kenalog

## 2018-10-11 NOTE — PROCEDURE: COUNSELING
Spironolactone Counseling: Patient advised regarding risks of diarrhea, abdominal pain, hyperkalemia, birth defects (for female patients), liver toxicity and renal toxicity. The patient may need blood work to monitor liver and kidney function and potassium levels while on therapy. The patient verbalized understanding of the proper use and possible adverse effects of spironolactone. All of the patient's questions and concerns were addressed.
Topical Retinoid counseling:  Patient advised to apply a pea-sized amount only at bedtime and wait 30 minutes after washing their face before applying. If too drying, patient may add a non-comedogenic moisturizer. The patient verbalized understanding of the proper use and possible adverse effects of retinoids. All of the patient's questions and concerns were addressed.
Topical Retinoid Pregnancy And Lactation Text: This medication is Pregnancy Category C. It is unknown if this medication is excreted in breast milk.
Doxycycline Pregnancy And Lactation Text: This medication is Pregnancy Category D and not consider safe during pregnancy. It is also excreted in breast milk but is considered safe for shorter treatment courses.
Doxycycline Counseling:  Patient counseled regarding possible photosensitivity and increased risk for sunburn. Patient instructed to avoid sunlight, if possible. When exposed to sunlight, patients should wear protective clothing, sunglasses, and sunscreen. The patient was instructed to call the office immediately if the following severe adverse effects occur:  hearing changes, easy bruising/bleeding, severe headache, or vision changes. The patient verbalized understanding of the proper use and possible adverse effects of doxycycline. All of the patient's questions and concerns were addressed.
Tazorac Counseling:  Patient advised that medication is irritating and drying. Patient may need to apply sparingly and wash off after an hour before eventually leaving it on overnight. The patient verbalized understanding of the proper use and possible adverse effects of tazorac. All of the patient's questions and concerns were addressed.
Tetracycline Pregnancy And Lactation Text: This medication is Pregnancy Category D and not consider safe during pregnancy. It is also excreted in breast milk.
Azithromycin Pregnancy And Lactation Text: This medication is considered safe during pregnancy and is also secreted in breast milk.
Erythromycin Counseling:  I discussed with the patient the risks of erythromycin including but not limited to GI upset, allergic reaction, drug rash, diarrhea, increase in liver enzymes, and yeast infections.
Detail Level: Detailed
Spironolactone Pregnancy And Lactation Text: This medication can cause feminization of the male fetus and should be avoided during pregnancy. The active metabolite is also found in breast milk.
Topical Sulfur Applications Counseling: Topical Sulfur Counseling: Patient counseled that this medication may cause skin irritation or allergic reactions. In the event of skin irritation, the patient was advised to reduce the amount of the drug applied or use it less frequently. The patient verbalized understanding of the proper use and possible adverse effects of topical sulfur application. All of the patient's questions and concerns were addressed.
Tazorac Pregnancy And Lactation Text: This medication is not safe during pregnancy. It is unknown if this medication is excreted in breast milk.
Isotretinoin Counseling: Patient should get monthly blood tests, not donate blood, not drive at night if vision affected, not share medication, and not undergo elective surgery for 6 months after tx completed. Side effects reviewed, pt to contact office should one occur.
High Dose Vitamin A Counseling: Side effects reviewed, pt to contact office should one occur.
Topical Sulfur Applications Pregnancy And Lactation Text: This medication is Pregnancy Category C and has an unknown safety profile during pregnancy. It is unknown if this topical medication is excreted in breast milk.
Birth Control Pills Pregnancy And Lactation Text: This medication should be avoided if pregnant and for the first 30 days post-partum.
Birth Control Pills Counseling: Birth Control Pill Counseling: I discussed with the patient the potential side effects of OCPs including but not limited to increased risk of stroke, heart attack, thrombophlebitis, deep venous thrombosis, hepatic adenomas, breast changes, GI upset, headaches, and depression. The patient verbalized understanding of the proper use and possible adverse effects of OCPs. All of the patient's questions and concerns were addressed.
Topical Clindamycin Counseling: Patient counseled that this medication may cause skin irritation or allergic reactions. In the event of skin irritation, the patient was advised to reduce the amount of the drug applied or use it less frequently. The patient verbalized understanding of the proper use and possible adverse effects of clindamycin. All of the patient's questions and concerns were addressed.
Isotretinoin Pregnancy And Lactation Text: This medication is Pregnancy Category X and is considered extremely dangerous during pregnancy. It is unknown if it is excreted in breast milk.
Bactrim Counseling:  I discussed with the patient the risks of sulfa antibiotics including but not limited to GI upset, allergic reaction, drug rash, diarrhea, dizziness, photosensitivity, and yeast infections. Rarely, more serious reactions can occur including but not limited to aplastic anemia, agranulocytosis, methemoglobinemia, blood dyscrasias, liver or kidney failure, lung infiltrates or desquamative/blistering drug rashes.
Bactrim Pregnancy And Lactation Text: This medication is Pregnancy Category D and is known to cause fetal risk. It is also excreted in breast milk.
Topical Clindamycin Pregnancy And Lactation Text: This medication is Pregnancy Category B and is considered safe during pregnancy. It is unknown if it is excreted in breast milk.
Include Pregnancy/Lactation Warning?: No
Azithromycin Counseling:  I discussed with the patient the risks of azithromycin including but not limited to GI upset, allergic reaction, drug rash, diarrhea, and yeast infections.
Erythromycin Pregnancy And Lactation Text: This medication is Pregnancy Category B and is considered safe during pregnancy. It is also excreted in breast milk.
High Dose Vitamin A Pregnancy And Lactation Text: High dose vitamin A therapy is contraindicated during pregnancy and breast feeding.
Dapsone Pregnancy And Lactation Text: This medication is Pregnancy Category C and is not considered safe during pregnancy or breast feeding.
Benzoyl Peroxide Counseling: Patient counseled that medicine may cause skin irritation and bleach clothing. In the event of skin irritation, the patient was advised to reduce the amount of the drug applied or use it less frequently. The patient verbalized understanding of the proper use and possible adverse effects of benzoyl peroxide. All of the patient's questions and concerns were addressed.
Minocycline Counseling: Patient advised regarding possible photosensitivity and discoloration of the teeth, skin, lips, tongue and gums. Patient instructed to avoid sunlight, if possible. When exposed to sunlight, patients should wear protective clothing, sunglasses, and sunscreen. The patient was instructed to call the office immediately if the following severe adverse effects occur:  hearing changes, easy bruising/bleeding, severe headache, or vision changes. The patient verbalized understanding of the proper use and possible adverse effects of minocycline. All of the patient's questions and concerns were addressed.
Dapsone Counseling: I discussed with the patient the risks of dapsone including but not limited to hemolytic anemia, agranulocytosis, rashes, methemoglobinemia, kidney failure, peripheral neuropathy, headaches, GI upset, and liver toxicity. Patients who start dapsone require monitoring including baseline LFTs and weekly CBCs for the first month, then every month thereafter. The patient verbalized understanding of the proper use and possible adverse effects of dapsone. All of the patient's questions and concerns were addressed.
Tetracycline Counseling: Patient counseled regarding possible photosensitivity and increased risk for sunburn. Patient instructed to avoid sunlight, if possible. When exposed to sunlight, patients should wear protective clothing, sunglasses, and sunscreen. The patient was instructed to call the office immediately if the following severe adverse effects occur:  hearing changes, easy bruising/bleeding, severe headache, or vision changes. The patient verbalized understanding of the proper use and possible adverse effects of tetracycline. All of the patient's questions and concerns were addressed. Patient understands to avoid pregnancy while on therapy due to potential birth defects.
Benzoyl Peroxide Pregnancy And Lactation Text: This medication is Pregnancy Category C. It is unknown if benzoyl peroxide is excreted in breast milk.

## 2018-10-23 ENCOUNTER — APPOINTMENT (RX ONLY)
Dept: URBAN - METROPOLITAN AREA CLINIC 54 | Facility: CLINIC | Age: 23
Setting detail: DERMATOLOGY
End: 2018-10-23

## 2018-10-23 DIAGNOSIS — L70.0 ACNE VULGARIS: ICD-10-CM

## 2018-10-23 PROCEDURE — ? ACNE SURGERY

## 2018-10-23 PROCEDURE — ? MICRODERMABRASION

## 2018-10-23 PROCEDURE — 10040 EXTRACTION: CPT

## 2018-10-23 ASSESSMENT — LOCATION SIMPLE DESCRIPTION DERM
LOCATION SIMPLE: LEFT CHEEK
LOCATION SIMPLE: RIGHT CHEEK
LOCATION SIMPLE: RIGHT LIP
LOCATION SIMPLE: INFERIOR FOREHEAD
LOCATION SIMPLE: CHIN

## 2018-10-23 ASSESSMENT — LOCATION DETAILED DESCRIPTION DERM
LOCATION DETAILED: INFERIOR MID FOREHEAD
LOCATION DETAILED: RIGHT CENTRAL MALAR CHEEK
LOCATION DETAILED: RIGHT UPPER CUTANEOUS LIP
LOCATION DETAILED: RIGHT SUPERIOR MEDIAL BUCCAL CHEEK
LOCATION DETAILED: LEFT MENTAL CREASE
LOCATION DETAILED: LEFT MEDIAL MALAR CHEEK

## 2018-10-23 ASSESSMENT — LOCATION ZONE DERM
LOCATION ZONE: FACE
LOCATION ZONE: LIP

## 2018-10-23 NOTE — PROCEDURE: MICRODERMABRASION
Consent: Written consent obtained, risks reviewed including but not limited to crusting, scabbing, blistering, scarring, darker or lighter pigmentary change, bruising, and/or incomplete response.
Detail Level: Zone
Number Of Passes: 2
Wand: Medium
Post-Care Instructions: I reviewed with the patient in detail post-care instructions. Patient should stay away from the sun and wear sun protection until treated areas are fully healed.
Vacuum Pressure: [address]
Price (Use Numbers Only, No Special Characters Or $): [address] [facility]
Prep Text: The patient's skin was cleaned and prepped with steam and Lactifoam cleanser. After vacuum was performed weekly HA pads/ daily toner / moisture therapy and spf 45 were applied.
Endpoint: mild erythema
Treatment Number: 0
Indication: acne

## 2018-10-23 NOTE — PROCEDURE: ACNE SURGERY
Post-Care Instructions: I reviewed with the patient in detail post-care instructions. Patient is to keep the treatment areas dry overnight, and then apply bacitracin twice daily until healed. Patient may apply hydrogen peroxide soaks to remove any crusting.
Render Number Of Lesions Treated: no
Detail Level: Zone
Render Post-Care Instructions In Note?: yes
Extraction Method: 18 gauge needle, cotton-tipped applicators and gentle lateral pressure
Acne Type: Comedonal Lesions
Hemostasis: Pressure
Consent was obtained and risks were reviewed including but not limited to scarring, infection, bleeding, scabbing, incomplete removal, and allergy to anesthesia.
Prep Text (Optional): Prior to extraction skin of the face was cleansed under steam using lactifoam cleanser

## 2018-11-15 ENCOUNTER — RX ONLY (OUTPATIENT)
Age: 23
Setting detail: RX ONLY
End: 2018-11-15

## 2018-11-15 ENCOUNTER — APPOINTMENT (RX ONLY)
Dept: URBAN - METROPOLITAN AREA CLINIC 54 | Facility: CLINIC | Age: 23
Setting detail: DERMATOLOGY
End: 2018-11-15

## 2018-11-15 DIAGNOSIS — Z41.9 ENCOUNTER FOR PROCEDURE FOR PURPOSES OTHER THAN REMEDYING HEALTH STATE, UNSPECIFIED: ICD-10-CM

## 2018-11-15 DIAGNOSIS — L66.8 OTHER CICATRICIAL ALOPECIA: ICD-10-CM

## 2018-11-15 DIAGNOSIS — L66.89 OTHER CICATRICIAL ALOPECIA: ICD-10-CM

## 2018-11-15 DIAGNOSIS — L70.0 ACNE VULGARIS: ICD-10-CM | Status: IMPROVED

## 2018-11-15 PROCEDURE — ? PRESCRIPTION

## 2018-11-15 PROCEDURE — 99213 OFFICE O/P EST LOW 20 MIN: CPT | Mod: 25

## 2018-11-15 PROCEDURE — ? TREATMENT REGIMEN

## 2018-11-15 PROCEDURE — ? INTRALESIONAL KENALOG

## 2018-11-15 PROCEDURE — ? COUNSELING

## 2018-11-15 PROCEDURE — ? CHEMICAL PEEL JESSNER/TCA

## 2018-11-15 PROCEDURE — 11900 INJECT SKIN LESIONS </W 7: CPT

## 2018-11-15 RX ORDER — DOXYCYCLINE 100 MG/1
CAPSULE ORAL BID
Qty: 60 | Refills: 3 | Status: CANCELLED

## 2018-11-15 RX ORDER — BENZOYL PEROXIDE 50 MG/ML
LIQUID TOPICAL
Qty: 1 | Refills: 1 | Status: ERX

## 2018-11-15 RX ORDER — SODIUM SULFACETAMIDE 100 MG/ML
LOTION TOPICAL
Qty: 1 | Refills: 2 | Status: ERX

## 2018-11-15 RX ORDER — CLINDAMYCIN PHOSPHATE 10 MG/ML
SOLUTION TOPICAL
Qty: 1 | Refills: 2 | Status: ERX

## 2018-11-15 RX ORDER — DOXYCYCLINE HYCLATE 120 MG/1
TABLET, DELAYED RELEASE ORAL
Qty: 30 | Refills: 2 | Status: ERX | COMMUNITY
Start: 2018-11-15

## 2018-11-15 RX ADMIN — DOXYCYCLINE HYCLATE: 120 TABLET, DELAYED RELEASE ORAL at 21:52

## 2018-11-15 ASSESSMENT — LOCATION SIMPLE DESCRIPTION DERM
LOCATION SIMPLE: LEFT SCALP
LOCATION SIMPLE: SCALP
LOCATION SIMPLE: RIGHT CHEEK
LOCATION SIMPLE: NOSE
LOCATION SIMPLE: RIGHT NOSE
LOCATION SIMPLE: LEFT CHEEK
LOCATION SIMPLE: CHIN
LOCATION SIMPLE: LEFT FOREHEAD
LOCATION SIMPLE: SUPERIOR FOREHEAD
LOCATION SIMPLE: RIGHT FOREHEAD
LOCATION SIMPLE: SCALP
LOCATION SIMPLE: LEFT SCALP
LOCATION SIMPLE: RIGHT SCALP
LOCATION SIMPLE: RIGHT LIP

## 2018-11-15 ASSESSMENT — LOCATION DETAILED DESCRIPTION DERM
LOCATION DETAILED: RIGHT MEDIAL FRONTAL SCALP
LOCATION DETAILED: RIGHT SUPERIOR PARIETAL SCALP
LOCATION DETAILED: RIGHT SUPERIOR FOREHEAD
LOCATION DETAILED: LEFT CENTRAL PARIETAL SCALP
LOCATION DETAILED: LEFT CENTRAL FRONTAL SCALP
LOCATION DETAILED: NASAL DORSUM
LOCATION DETAILED: RIGHT CENTRAL MALAR CHEEK
LOCATION DETAILED: RIGHT INFERIOR VERMILION LIP
LOCATION DETAILED: LEFT CENTRAL FRONTAL SCALP
LOCATION DETAILED: SUPERIOR MID FOREHEAD
LOCATION DETAILED: RIGHT SUPERIOR CENTRAL MALAR CHEEK
LOCATION DETAILED: LEFT SUPERIOR PARIETAL SCALP
LOCATION DETAILED: LEFT MEDIAL FRONTAL SCALP
LOCATION DETAILED: LEFT SUPERIOR PARIETAL SCALP
LOCATION DETAILED: LEFT INFERIOR CENTRAL MALAR CHEEK
LOCATION DETAILED: LEFT FOREHEAD
LOCATION DETAILED: LEFT CENTRAL MALAR CHEEK
LOCATION DETAILED: RIGHT CHIN
LOCATION DETAILED: RIGHT NASAL SIDEWALL

## 2018-11-15 ASSESSMENT — LOCATION ZONE DERM
LOCATION ZONE: FACE
LOCATION ZONE: SCALP
LOCATION ZONE: NOSE
LOCATION ZONE: SCALP
LOCATION ZONE: LIP

## 2018-11-15 NOTE — PROCEDURE: INTRALESIONAL KENALOG
X Size Of Lesion In Cm (Optional): 0
Ndc# For Kenalog Only: 0111-6644-41
Concentration Of Solution Injected (Mg/Ml): 10.0
Consent: The risks of atrophy were reviewed with the patient.
Total Volume Injected (Ccs- Only Use Numbers And Decimals): 0.5
Kenalog Preparation: Kenalog
Include Z78.9 (Other Specified Conditions Influencing Health Status) As An Associated Diagnosis?: No
Treatment Number (Optional): 4
Medical Necessity Clause: This procedure was medically necessary because the lesions that were treated were:
Administered By (Optional): Magy Banda
Detail Level: Detailed

## 2018-11-15 NOTE — PROCEDURE: TREATMENT REGIMEN
Initiate Treatment: Doryx 120mg take 1 tab daily
Detail Level: Zone
Continue Regimen: Clindamycin 10% Apply to face Twice a day\\nBenzoyl peroxide 10% apply to face Twice a day\\nSulfacetamide Apply to face Twice a day

## 2018-11-15 NOTE — PROCEDURE: COUNSELING
Tazorac Counseling:  Patient advised that medication is irritating and drying. Patient may need to apply sparingly and wash off after an hour before eventually leaving it on overnight. The patient verbalized understanding of the proper use and possible adverse effects of tazorac. All of the patient's questions and concerns were addressed.
Dapsone Pregnancy And Lactation Text: This medication is Pregnancy Category C and is not considered safe during pregnancy or breast feeding.
Minocycline Pregnancy And Lactation Text: This medication is Pregnancy Category D and not consider safe during pregnancy. It is also excreted in breast milk.
Topical Clindamycin Pregnancy And Lactation Text: This medication is Pregnancy Category B and is considered safe during pregnancy. It is unknown if it is excreted in breast milk.
Topical Sulfur Applications Counseling: Topical Sulfur Counseling: Patient counseled that this medication may cause skin irritation or allergic reactions. In the event of skin irritation, the patient was advised to reduce the amount of the drug applied or use it less frequently. The patient verbalized understanding of the proper use and possible adverse effects of topical sulfur application. All of the patient's questions and concerns were addressed.
Spironolactone Counseling: Patient advised regarding risks of diarrhea, abdominal pain, hyperkalemia, birth defects (for female patients), liver toxicity and renal toxicity. The patient may need blood work to monitor liver and kidney function and potassium levels while on therapy. The patient verbalized understanding of the proper use and possible adverse effects of spironolactone. All of the patient's questions and concerns were addressed.
Minocycline Counseling: Patient advised regarding possible photosensitivity and discoloration of the teeth, skin, lips, tongue and gums. Patient instructed to avoid sunlight, if possible. When exposed to sunlight, patients should wear protective clothing, sunglasses, and sunscreen. The patient was instructed to call the office immediately if the following severe adverse effects occur:  hearing changes, easy bruising/bleeding, severe headache, or vision changes. The patient verbalized understanding of the proper use and possible adverse effects of minocycline. All of the patient's questions and concerns were addressed.
Tazorac Pregnancy And Lactation Text: This medication is not safe during pregnancy. It is unknown if this medication is excreted in breast milk.
Topical Retinoid Pregnancy And Lactation Text: This medication is Pregnancy Category C. It is unknown if this medication is excreted in breast milk.
Birth Control Pills Pregnancy And Lactation Text: This medication should be avoided if pregnant and for the first 30 days post-partum.
Tetracycline Counseling: Patient counseled regarding possible photosensitivity and increased risk for sunburn. Patient instructed to avoid sunlight, if possible. When exposed to sunlight, patients should wear protective clothing, sunglasses, and sunscreen. The patient was instructed to call the office immediately if the following severe adverse effects occur:  hearing changes, easy bruising/bleeding, severe headache, or vision changes. The patient verbalized understanding of the proper use and possible adverse effects of tetracycline. All of the patient's questions and concerns were addressed. Patient understands to avoid pregnancy while on therapy due to potential birth defects.
Doxycycline Pregnancy And Lactation Text: This medication is Pregnancy Category D and not consider safe during pregnancy. It is also excreted in breast milk but is considered safe for shorter treatment courses.
Bactrim Counseling:  I discussed with the patient the risks of sulfa antibiotics including but not limited to GI upset, allergic reaction, drug rash, diarrhea, dizziness, photosensitivity, and yeast infections. Rarely, more serious reactions can occur including but not limited to aplastic anemia, agranulocytosis, methemoglobinemia, blood dyscrasias, liver or kidney failure, lung infiltrates or desquamative/blistering drug rashes.
Topical Retinoid counseling:  Patient advised to apply a pea-sized amount only at bedtime and wait 30 minutes after washing their face before applying. If too drying, patient may add a non-comedogenic moisturizer. The patient verbalized understanding of the proper use and possible adverse effects of retinoids. All of the patient's questions and concerns were addressed.
Include Pregnancy/Lactation Warning?: No
Erythromycin Pregnancy And Lactation Text: This medication is Pregnancy Category B and is considered safe during pregnancy. It is also excreted in breast milk.
Spironolactone Pregnancy And Lactation Text: This medication can cause feminization of the male fetus and should be avoided during pregnancy. The active metabolite is also found in breast milk.
Benzoyl Peroxide Counseling: Patient counseled that medicine may cause skin irritation and bleach clothing. In the event of skin irritation, the patient was advised to reduce the amount of the drug applied or use it less frequently. The patient verbalized understanding of the proper use and possible adverse effects of benzoyl peroxide. All of the patient's questions and concerns were addressed.
Dapsone Counseling: I discussed with the patient the risks of dapsone including but not limited to hemolytic anemia, agranulocytosis, rashes, methemoglobinemia, kidney failure, peripheral neuropathy, headaches, GI upset, and liver toxicity. Patients who start dapsone require monitoring including baseline LFTs and weekly CBCs for the first month, then every month thereafter. The patient verbalized understanding of the proper use and possible adverse effects of dapsone. All of the patient's questions and concerns were addressed.
Isotretinoin Pregnancy And Lactation Text: This medication is Pregnancy Category X and is considered extremely dangerous during pregnancy. It is unknown if it is excreted in breast milk.
Benzoyl Peroxide Pregnancy And Lactation Text: This medication is Pregnancy Category C. It is unknown if benzoyl peroxide is excreted in breast milk.
Erythromycin Counseling:  I discussed with the patient the risks of erythromycin including but not limited to GI upset, allergic reaction, drug rash, diarrhea, increase in liver enzymes, and yeast infections.
High Dose Vitamin A Pregnancy And Lactation Text: High dose vitamin A therapy is contraindicated during pregnancy and breast feeding.
Azithromycin Pregnancy And Lactation Text: This medication is considered safe during pregnancy and is also secreted in breast milk.
Isotretinoin Counseling: Patient should get monthly blood tests, not donate blood, not drive at night if vision affected, not share medication, and not undergo elective surgery for 6 months after tx completed. Side effects reviewed, pt to contact office should one occur.
Doxycycline Counseling:  Patient counseled regarding possible photosensitivity and increased risk for sunburn. Patient instructed to avoid sunlight, if possible. When exposed to sunlight, patients should wear protective clothing, sunglasses, and sunscreen. The patient was instructed to call the office immediately if the following severe adverse effects occur:  hearing changes, easy bruising/bleeding, severe headache, or vision changes. The patient verbalized understanding of the proper use and possible adverse effects of doxycycline. All of the patient's questions and concerns were addressed.
Topical Sulfur Applications Pregnancy And Lactation Text: This medication is Pregnancy Category C and has an unknown safety profile during pregnancy. It is unknown if this topical medication is excreted in breast milk.
High Dose Vitamin A Counseling: Side effects reviewed, pt to contact office should one occur.
Bactrim Pregnancy And Lactation Text: This medication is Pregnancy Category D and is known to cause fetal risk. It is also excreted in breast milk.
Birth Control Pills Counseling: Birth Control Pill Counseling: I discussed with the patient the potential side effects of OCPs including but not limited to increased risk of stroke, heart attack, thrombophlebitis, deep venous thrombosis, hepatic adenomas, breast changes, GI upset, headaches, and depression. The patient verbalized understanding of the proper use and possible adverse effects of OCPs. All of the patient's questions and concerns were addressed.
Azithromycin Counseling:  I discussed with the patient the risks of azithromycin including but not limited to GI upset, allergic reaction, drug rash, diarrhea, and yeast infections.
Topical Clindamycin Counseling: Patient counseled that this medication may cause skin irritation or allergic reactions. In the event of skin irritation, the patient was advised to reduce the amount of the drug applied or use it less frequently. The patient verbalized understanding of the proper use and possible adverse effects of clindamycin. All of the patient's questions and concerns were addressed.
Detail Level: Detailed

## 2018-11-15 NOTE — PROCEDURE: CHEMICAL PEEL JESSNER/TCA
Post-Care Instructions: I reviewed with the patient in detail post-care instructions. Patient should avoid sun exposure and wear sun protection.
Treatment Number: 0
Frost (0,1+,2+,3+,4+): 1+
Time (Mins): 2
Post Peel Care: After the procedure, the treatment area was washed with soap and water, and a post-peel cream was applied. Sun protection and post-care instructions were reviewed with the patient.
Erythema: mild
Prep: Patientâs face was cleaned thoroughly before applying the peel
Detail Level: Detailed
Chemical Peel: 15% TCA
Consent: Prior to the procedure, written consent was obtained and risks were reviewed, including but not limited to: redness, peeling, blistering, pigmentary change, scarring, infection, and pain.

## 2018-12-04 ENCOUNTER — APPOINTMENT (RX ONLY)
Dept: URBAN - METROPOLITAN AREA CLINIC 54 | Facility: CLINIC | Age: 23
Setting detail: DERMATOLOGY
End: 2018-12-04

## 2018-12-04 DIAGNOSIS — L70.0 ACNE VULGARIS: ICD-10-CM

## 2018-12-04 PROCEDURE — ? ACNE SURGERY

## 2018-12-04 PROCEDURE — ? MICRODERMABRASION

## 2018-12-04 PROCEDURE — 10040 EXTRACTION: CPT

## 2018-12-04 ASSESSMENT — LOCATION DETAILED DESCRIPTION DERM
LOCATION DETAILED: RIGHT MEDIAL MANDIBULAR CHEEK
LOCATION DETAILED: RIGHT CENTRAL MALAR CHEEK
LOCATION DETAILED: LEFT CENTRAL MALAR CHEEK
LOCATION DETAILED: RIGHT MEDIAL FOREHEAD
LOCATION DETAILED: RIGHT CHIN
LOCATION DETAILED: RIGHT INFERIOR CENTRAL MALAR CHEEK

## 2018-12-04 ASSESSMENT — LOCATION SIMPLE DESCRIPTION DERM
LOCATION SIMPLE: RIGHT FOREHEAD
LOCATION SIMPLE: RIGHT CHEEK
LOCATION SIMPLE: LEFT CHEEK
LOCATION SIMPLE: CHIN

## 2018-12-04 ASSESSMENT — LOCATION ZONE DERM: LOCATION ZONE: FACE

## 2018-12-04 NOTE — PROCEDURE: ACNE SURGERY
Post-Care Instructions: I reviewed with the patient in detail post-care instructions. Patient is to keep the treatment areas dry overnight, and then apply bacitracin twice daily until healed. Patient may apply hydrogen peroxide soaks to remove any crusting.
Prep Text (Optional): Prior to extraction skin of the face was cleansed under steam using lactifoam cleanser
Render Post-Care Instructions In Note?: yes
Hemostasis: Pressure
Render Number Of Lesions Treated: no
Consent was obtained and risks were reviewed including but not limited to scarring, infection, bleeding, scabbing, incomplete removal, and allergy to anesthesia.
Extraction Method: 18 gauge needle, cotton-tipped applicators and gentle lateral pressure
Detail Level: Zone
Acne Type: Comedonal Lesions

## 2018-12-04 NOTE — PROCEDURE: MICRODERMABRASION
Vacuum Pressure: [address]
Endpoint: mild erythema
Detail Level: Zone
Post-Care Instructions: I reviewed with the patient in detail post-care instructions. Patient should stay away from the sun and wear sun protection until treated areas are fully healed.
Prep Text: The patient's skin was cleaned and prepped with steam and Lactifoam cleanser. After vacuum was performed weekly HA pads/ daily toner / moisture therapy and spf 45 were applied.
Price (Use Numbers Only, No Special Characters Or $): [address] [facility]
Consent: Written consent obtained, risks reviewed including but not limited to crusting, scabbing, blistering, scarring, darker or lighter pigmentary change, bruising, and/or incomplete response.
Number Of Passes: 2
Wand: Medium
Treatment Number: 0
Indication: acne

## 2018-12-05 ENCOUNTER — APPOINTMENT (RX ONLY)
Dept: URBAN - METROPOLITAN AREA CLINIC 54 | Facility: CLINIC | Age: 23
Setting detail: DERMATOLOGY
End: 2018-12-05

## 2018-12-18 ENCOUNTER — APPOINTMENT (RX ONLY)
Dept: URBAN - METROPOLITAN AREA CLINIC 54 | Facility: CLINIC | Age: 23
Setting detail: DERMATOLOGY
End: 2018-12-18

## 2018-12-18 DIAGNOSIS — L66.8 OTHER CICATRICIAL ALOPECIA: ICD-10-CM

## 2018-12-18 DIAGNOSIS — L70.0 ACNE VULGARIS: ICD-10-CM | Status: RESOLVING

## 2018-12-18 DIAGNOSIS — Z41.9 ENCOUNTER FOR PROCEDURE FOR PURPOSES OTHER THAN REMEDYING HEALTH STATE, UNSPECIFIED: ICD-10-CM

## 2018-12-18 DIAGNOSIS — L66.89 OTHER CICATRICIAL ALOPECIA: ICD-10-CM

## 2018-12-18 PROCEDURE — ? CHEMICAL PEEL JESSNER/TCA

## 2018-12-18 PROCEDURE — ? OTHER

## 2018-12-18 PROCEDURE — 99213 OFFICE O/P EST LOW 20 MIN: CPT

## 2018-12-18 PROCEDURE — ? COUNSELING

## 2018-12-18 PROCEDURE — ? TREATMENT REGIMEN

## 2018-12-18 ASSESSMENT — LOCATION DETAILED DESCRIPTION DERM
LOCATION DETAILED: SUPERIOR MID FOREHEAD
LOCATION DETAILED: RIGHT MEDIAL FOREHEAD
LOCATION DETAILED: RIGHT SUPERIOR MEDIAL FOREHEAD
LOCATION DETAILED: RIGHT SUPERIOR FOREHEAD
LOCATION DETAILED: RIGHT CENTRAL MALAR CHEEK
LOCATION DETAILED: LEFT CENTRAL MALAR CHEEK
LOCATION DETAILED: LEFT SUPERIOR MEDIAL FOREHEAD
LOCATION DETAILED: LEFT LOWER CUTANEOUS LIP
LOCATION DETAILED: LEFT SUPERIOR FOREHEAD

## 2018-12-18 ASSESSMENT — LOCATION ZONE DERM
LOCATION ZONE: FACE
LOCATION ZONE: LIP

## 2018-12-18 ASSESSMENT — LOCATION SIMPLE DESCRIPTION DERM
LOCATION SIMPLE: LEFT LIP
LOCATION SIMPLE: LEFT FOREHEAD
LOCATION SIMPLE: SUPERIOR FOREHEAD
LOCATION SIMPLE: LEFT CHEEK
LOCATION SIMPLE: RIGHT FOREHEAD
LOCATION SIMPLE: RIGHT CHEEK

## 2018-12-18 NOTE — PROCEDURE: OTHER
Detail Level: Zone
Other (Free Text): Chemical peel done by Echo
Note Text (......Xxx Chief Complaint.): This diagnosis correlates with the

## 2018-12-18 NOTE — PROCEDURE: TREATMENT REGIMEN
Continue Regimen: Clindamycin 10% Apply to affected area twice a day\\nBenzoyl peroxide 10% apply to affected area twice a day \\nSulfacetamide Apply to affected area twice a day\\nDoryâs 120 take one tablet every day
Detail Level: Zone

## 2018-12-18 NOTE — PROCEDURE: COUNSELING
Isotretinoin Counseling: Patient should get monthly blood tests, not donate blood, not drive at night if vision affected, not share medication, and not undergo elective surgery for 6 months after tx completed. Side effects reviewed, pt to contact office should one occur.
Minocycline Counseling: Patient advised regarding possible photosensitivity and discoloration of the teeth, skin, lips, tongue and gums. Patient instructed to avoid sunlight, if possible. When exposed to sunlight, patients should wear protective clothing, sunglasses, and sunscreen. The patient was instructed to call the office immediately if the following severe adverse effects occur:  hearing changes, easy bruising/bleeding, severe headache, or vision changes. The patient verbalized understanding of the proper use and possible adverse effects of minocycline. All of the patient's questions and concerns were addressed.
Tetracycline Counseling: Patient counseled regarding possible photosensitivity and increased risk for sunburn. Patient instructed to avoid sunlight, if possible. When exposed to sunlight, patients should wear protective clothing, sunglasses, and sunscreen. The patient was instructed to call the office immediately if the following severe adverse effects occur:  hearing changes, easy bruising/bleeding, severe headache, or vision changes. The patient verbalized understanding of the proper use and possible adverse effects of tetracycline. All of the patient's questions and concerns were addressed. Patient understands to avoid pregnancy while on therapy due to potential birth defects.
Topical Retinoid counseling:  Patient advised to apply a pea-sized amount only at bedtime and wait 30 minutes after washing their face before applying. If too drying, patient may add a non-comedogenic moisturizer. The patient verbalized understanding of the proper use and possible adverse effects of retinoids. All of the patient's questions and concerns were addressed.
Birth Control Pills Counseling: Birth Control Pill Counseling: I discussed with the patient the potential side effects of OCPs including but not limited to increased risk of stroke, heart attack, thrombophlebitis, deep venous thrombosis, hepatic adenomas, breast changes, GI upset, headaches, and depression. The patient verbalized understanding of the proper use and possible adverse effects of OCPs. All of the patient's questions and concerns were addressed.
Doxycycline Counseling:  Patient counseled regarding possible photosensitivity and increased risk for sunburn. Patient instructed to avoid sunlight, if possible. When exposed to sunlight, patients should wear protective clothing, sunglasses, and sunscreen. The patient was instructed to call the office immediately if the following severe adverse effects occur:  hearing changes, easy bruising/bleeding, severe headache, or vision changes. The patient verbalized understanding of the proper use and possible adverse effects of doxycycline. All of the patient's questions and concerns were addressed.
Include Pregnancy/Lactation Warning?: No
Minocycline Pregnancy And Lactation Text: This medication is Pregnancy Category D and not consider safe during pregnancy. It is also excreted in breast milk.
Topical Retinoid Pregnancy And Lactation Text: This medication is Pregnancy Category C. It is unknown if this medication is excreted in breast milk.
Topical Clindamycin Pregnancy And Lactation Text: This medication is Pregnancy Category B and is considered safe during pregnancy. It is unknown if it is excreted in breast milk.
Topical Clindamycin Counseling: Patient counseled that this medication may cause skin irritation or allergic reactions. In the event of skin irritation, the patient was advised to reduce the amount of the drug applied or use it less frequently. The patient verbalized understanding of the proper use and possible adverse effects of clindamycin. All of the patient's questions and concerns were addressed.
Azithromycin Pregnancy And Lactation Text: This medication is considered safe during pregnancy and is also secreted in breast milk.
Birth Control Pills Pregnancy And Lactation Text: This medication should be avoided if pregnant and for the first 30 days post-partum.
Doxycycline Pregnancy And Lactation Text: This medication is Pregnancy Category D and not consider safe during pregnancy. It is also excreted in breast milk but is considered safe for shorter treatment courses.
Isotretinoin Pregnancy And Lactation Text: This medication is Pregnancy Category X and is considered extremely dangerous during pregnancy. It is unknown if it is excreted in breast milk.
Erythromycin Counseling:  I discussed with the patient the risks of erythromycin including but not limited to GI upset, allergic reaction, drug rash, diarrhea, increase in liver enzymes, and yeast infections.
High Dose Vitamin A Counseling: Side effects reviewed, pt to contact office should one occur.
Spironolactone Counseling: Patient advised regarding risks of diarrhea, abdominal pain, hyperkalemia, birth defects (for female patients), liver toxicity and renal toxicity. The patient may need blood work to monitor liver and kidney function and potassium levels while on therapy. The patient verbalized understanding of the proper use and possible adverse effects of spironolactone. All of the patient's questions and concerns were addressed.
Benzoyl Peroxide Counseling: Patient counseled that medicine may cause skin irritation and bleach clothing. In the event of skin irritation, the patient was advised to reduce the amount of the drug applied or use it less frequently. The patient verbalized understanding of the proper use and possible adverse effects of benzoyl peroxide. All of the patient's questions and concerns were addressed.
Tazorac Counseling:  Patient advised that medication is irritating and drying. Patient may need to apply sparingly and wash off after an hour before eventually leaving it on overnight. The patient verbalized understanding of the proper use and possible adverse effects of tazorac. All of the patient's questions and concerns were addressed.
Bactrim Counseling:  I discussed with the patient the risks of sulfa antibiotics including but not limited to GI upset, allergic reaction, drug rash, diarrhea, dizziness, photosensitivity, and yeast infections. Rarely, more serious reactions can occur including but not limited to aplastic anemia, agranulocytosis, methemoglobinemia, blood dyscrasias, liver or kidney failure, lung infiltrates or desquamative/blistering drug rashes.
Dapsone Counseling: I discussed with the patient the risks of dapsone including but not limited to hemolytic anemia, agranulocytosis, rashes, methemoglobinemia, kidney failure, peripheral neuropathy, headaches, GI upset, and liver toxicity. Patients who start dapsone require monitoring including baseline LFTs and weekly CBCs for the first month, then every month thereafter. The patient verbalized understanding of the proper use and possible adverse effects of dapsone. All of the patient's questions and concerns were addressed.
Detail Level: Detailed
High Dose Vitamin A Pregnancy And Lactation Text: High dose vitamin A therapy is contraindicated during pregnancy and breast feeding.
Spironolactone Pregnancy And Lactation Text: This medication can cause feminization of the male fetus and should be avoided during pregnancy. The active metabolite is also found in breast milk.
Benzoyl Peroxide Pregnancy And Lactation Text: This medication is Pregnancy Category C. It is unknown if benzoyl peroxide is excreted in breast milk.
Tazorac Pregnancy And Lactation Text: This medication is not safe during pregnancy. It is unknown if this medication is excreted in breast milk.
Topical Sulfur Applications Pregnancy And Lactation Text: This medication is Pregnancy Category C and has an unknown safety profile during pregnancy. It is unknown if this topical medication is excreted in breast milk.
Azithromycin Counseling:  I discussed with the patient the risks of azithromycin including but not limited to GI upset, allergic reaction, drug rash, diarrhea, and yeast infections.
Topical Sulfur Applications Counseling: Topical Sulfur Counseling: Patient counseled that this medication may cause skin irritation or allergic reactions. In the event of skin irritation, the patient was advised to reduce the amount of the drug applied or use it less frequently. The patient verbalized understanding of the proper use and possible adverse effects of topical sulfur application. All of the patient's questions and concerns were addressed.
Bactrim Pregnancy And Lactation Text: This medication is Pregnancy Category D and is known to cause fetal risk. It is also excreted in breast milk.
Dapsone Pregnancy And Lactation Text: This medication is Pregnancy Category C and is not considered safe during pregnancy or breast feeding.
Erythromycin Pregnancy And Lactation Text: This medication is Pregnancy Category B and is considered safe during pregnancy. It is also excreted in breast milk.

## 2018-12-18 NOTE — PROCEDURE: CHEMICAL PEEL JESSNER/TCA
Treatment Number: 0
Number Of Coats: 2
Frost (0,1+,2+,3+,4+): 1+
Erythema: mild
Price (Use Numbers Only, No Special Characters Or $): 75
Prep: Patientâs skin was cleaned thoroughly before applying the peel
Post Peel Care: After the procedure, the treatment area was washed with soap and water, and a post-peel cream was applied. Sun protection and post-care instructions were reviewed with the patient.
Consent: Prior to the procedure, written consent was obtained and risks were reviewed, including but not limited to: redness, peeling, blistering, pigmentary change, scarring, infection, and pain.
Post-Care Instructions: I reviewed with the patient in detail post-care instructions. Patient should avoid sun exposure and wear sun protection.
Chemical Peel: 15% TCA
Detail Level: Detailed

## 2019-01-08 ENCOUNTER — APPOINTMENT (RX ONLY)
Dept: URBAN - METROPOLITAN AREA CLINIC 54 | Facility: CLINIC | Age: 24
Setting detail: DERMATOLOGY
End: 2019-01-08

## 2019-01-08 DIAGNOSIS — L70.0 ACNE VULGARIS: ICD-10-CM

## 2019-01-08 PROCEDURE — 10040 EXTRACTION: CPT

## 2019-01-08 PROCEDURE — ? ACNE SURGERY

## 2019-01-08 PROCEDURE — ? COUNSELING

## 2019-01-08 PROCEDURE — ? MICRODERMABRASION

## 2019-01-08 ASSESSMENT — LOCATION ZONE DERM
LOCATION ZONE: NOSE
LOCATION ZONE: FACE

## 2019-01-08 ASSESSMENT — LOCATION DETAILED DESCRIPTION DERM
LOCATION DETAILED: RIGHT CENTRAL MALAR CHEEK
LOCATION DETAILED: LEFT CENTRAL MALAR CHEEK
LOCATION DETAILED: SUPERIOR MID FOREHEAD
LOCATION DETAILED: LEFT CHIN
LOCATION DETAILED: NASAL TIP
LOCATION DETAILED: LEFT INFERIOR CENTRAL MALAR CHEEK

## 2019-01-08 ASSESSMENT — LOCATION SIMPLE DESCRIPTION DERM
LOCATION SIMPLE: RIGHT CHEEK
LOCATION SIMPLE: NOSE
LOCATION SIMPLE: CHIN
LOCATION SIMPLE: LEFT CHEEK
LOCATION SIMPLE: SUPERIOR FOREHEAD

## 2019-01-08 NOTE — PROCEDURE: COUNSELING
Topical Sulfur Applications Pregnancy And Lactation Text: This medication is Pregnancy Category C and has an unknown safety profile during pregnancy. It is unknown if this topical medication is excreted in breast milk.
Minocycline Pregnancy And Lactation Text: This medication is Pregnancy Category D and not consider safe during pregnancy. It is also excreted in breast milk.
Benzoyl Peroxide Counseling: Patient counseled that medicine may cause skin irritation and bleach clothing. In the event of skin irritation, the patient was advised to reduce the amount of the drug applied or use it less frequently. The patient verbalized understanding of the proper use and possible adverse effects of benzoyl peroxide. All of the patient's questions and concerns were addressed.
Bactrim Counseling:  I discussed with the patient the risks of sulfa antibiotics including but not limited to GI upset, allergic reaction, drug rash, diarrhea, dizziness, photosensitivity, and yeast infections. Rarely, more serious reactions can occur including but not limited to aplastic anemia, agranulocytosis, methemoglobinemia, blood dyscrasias, liver or kidney failure, lung infiltrates or desquamative/blistering drug rashes.
Tazorac Counseling:  Patient advised that medication is irritating and drying. Patient may need to apply sparingly and wash off after an hour before eventually leaving it on overnight. The patient verbalized understanding of the proper use and possible adverse effects of tazorac. All of the patient's questions and concerns were addressed.
Isotretinoin Counseling: Patient should get monthly blood tests, not donate blood, not drive at night if vision affected, not share medication, and not undergo elective surgery for 6 months after tx completed. Side effects reviewed, pt to contact office should one occur.
Dapsone Pregnancy And Lactation Text: This medication is Pregnancy Category C and is not considered safe during pregnancy or breast feeding.
Benzoyl Peroxide Pregnancy And Lactation Text: This medication is Pregnancy Category C. It is unknown if benzoyl peroxide is excreted in breast milk.
Doxycycline Pregnancy And Lactation Text: This medication is Pregnancy Category D and not consider safe during pregnancy. It is also excreted in breast milk but is considered safe for shorter treatment courses.
Include Pregnancy/Lactation Warning?: No
Birth Control Pills Counseling: Birth Control Pill Counseling: I discussed with the patient the potential side effects of OCPs including but not limited to increased risk of stroke, heart attack, thrombophlebitis, deep venous thrombosis, hepatic adenomas, breast changes, GI upset, headaches, and depression. The patient verbalized understanding of the proper use and possible adverse effects of OCPs. All of the patient's questions and concerns were addressed.
Doxycycline Counseling:  Patient counseled regarding possible photosensitivity and increased risk for sunburn. Patient instructed to avoid sunlight, if possible. When exposed to sunlight, patients should wear protective clothing, sunglasses, and sunscreen. The patient was instructed to call the office immediately if the following severe adverse effects occur:  hearing changes, easy bruising/bleeding, severe headache, or vision changes. The patient verbalized understanding of the proper use and possible adverse effects of doxycycline. All of the patient's questions and concerns were addressed.
Bactrim Pregnancy And Lactation Text: This medication is Pregnancy Category D and is known to cause fetal risk. It is also excreted in breast milk.
Spironolactone Counseling: Patient advised regarding risks of diarrhea, abdominal pain, hyperkalemia, birth defects (for female patients), liver toxicity and renal toxicity. The patient may need blood work to monitor liver and kidney function and potassium levels while on therapy. The patient verbalized understanding of the proper use and possible adverse effects of spironolactone. All of the patient's questions and concerns were addressed.
Isotretinoin Pregnancy And Lactation Text: This medication is Pregnancy Category X and is considered extremely dangerous during pregnancy. It is unknown if it is excreted in breast milk.
Tazorac Pregnancy And Lactation Text: This medication is not safe during pregnancy. It is unknown if this medication is excreted in breast milk.
Birth Control Pills Pregnancy And Lactation Text: This medication should be avoided if pregnant and for the first 30 days post-partum.
Azithromycin Counseling:  I discussed with the patient the risks of azithromycin including but not limited to GI upset, allergic reaction, drug rash, diarrhea, and yeast infections.
Topical Clindamycin Pregnancy And Lactation Text: This medication is Pregnancy Category B and is considered safe during pregnancy. It is unknown if it is excreted in breast milk.
High Dose Vitamin A Pregnancy And Lactation Text: High dose vitamin A therapy is contraindicated during pregnancy and breast feeding.
Erythromycin Counseling:  I discussed with the patient the risks of erythromycin including but not limited to GI upset, allergic reaction, drug rash, diarrhea, increase in liver enzymes, and yeast infections.
Topical Retinoid counseling:  Patient advised to apply a pea-sized amount only at bedtime and wait 30 minutes after washing their face before applying. If too drying, patient may add a non-comedogenic moisturizer. The patient verbalized understanding of the proper use and possible adverse effects of retinoids. All of the patient's questions and concerns were addressed.
High Dose Vitamin A Counseling: Side effects reviewed, pt to contact office should one occur.
Topical Clindamycin Counseling: Patient counseled that this medication may cause skin irritation or allergic reactions. In the event of skin irritation, the patient was advised to reduce the amount of the drug applied or use it less frequently. The patient verbalized understanding of the proper use and possible adverse effects of clindamycin. All of the patient's questions and concerns were addressed.
Spironolactone Pregnancy And Lactation Text: This medication can cause feminization of the male fetus and should be avoided during pregnancy. The active metabolite is also found in breast milk.
Erythromycin Pregnancy And Lactation Text: This medication is Pregnancy Category B and is considered safe during pregnancy. It is also excreted in breast milk.
Topical Retinoid Pregnancy And Lactation Text: This medication is Pregnancy Category C. It is unknown if this medication is excreted in breast milk.
Dapsone Counseling: I discussed with the patient the risks of dapsone including but not limited to hemolytic anemia, agranulocytosis, rashes, methemoglobinemia, kidney failure, peripheral neuropathy, headaches, GI upset, and liver toxicity. Patients who start dapsone require monitoring including baseline LFTs and weekly CBCs for the first month, then every month thereafter. The patient verbalized understanding of the proper use and possible adverse effects of dapsone. All of the patient's questions and concerns were addressed.
Minocycline Counseling: Patient advised regarding possible photosensitivity and discoloration of the teeth, skin, lips, tongue and gums. Patient instructed to avoid sunlight, if possible. When exposed to sunlight, patients should wear protective clothing, sunglasses, and sunscreen. The patient was instructed to call the office immediately if the following severe adverse effects occur:  hearing changes, easy bruising/bleeding, severe headache, or vision changes. The patient verbalized understanding of the proper use and possible adverse effects of minocycline. All of the patient's questions and concerns were addressed.
Topical Sulfur Applications Counseling: Topical Sulfur Counseling: Patient counseled that this medication may cause skin irritation or allergic reactions. In the event of skin irritation, the patient was advised to reduce the amount of the drug applied or use it less frequently. The patient verbalized understanding of the proper use and possible adverse effects of topical sulfur application. All of the patient's questions and concerns were addressed.
Tetracycline Counseling: Patient counseled regarding possible photosensitivity and increased risk for sunburn. Patient instructed to avoid sunlight, if possible. When exposed to sunlight, patients should wear protective clothing, sunglasses, and sunscreen. The patient was instructed to call the office immediately if the following severe adverse effects occur:  hearing changes, easy bruising/bleeding, severe headache, or vision changes. The patient verbalized understanding of the proper use and possible adverse effects of tetracycline. All of the patient's questions and concerns were addressed. Patient understands to avoid pregnancy while on therapy due to potential birth defects.
Detail Level: Detailed
Azithromycin Pregnancy And Lactation Text: This medication is considered safe during pregnancy and is also secreted in breast milk.

## 2019-01-08 NOTE — PROCEDURE: ACNE SURGERY
Render Post-Care Instructions In Note?: no
Extraction Method: 18 gauge needle, cotton-tipped applicators and gentle lateral pressure
Consent was obtained and risks were reviewed including but not limited to scarring, infection, bleeding, scabbing, incomplete removal, and allergy to anesthesia.
Prep Text (Optional): Prior to extraction skin of the face was cleansed under steam using lactifoam cleanser
Acne Type: Comedonal Lesions
Detail Level: Zone
Post-Care Instructions: I reviewed with the patient in detail post-care instructions. Patient is to keep the treatment areas dry overnight, and then apply bacitracin twice daily until healed. Patient may apply hydrogen peroxide soaks to remove any crusting.
Hemostasis: Pressure

## 2019-01-08 NOTE — PROCEDURE: MICRODERMABRASION
Indication: acne
Number Of Passes: 2
Endpoint: mild erythema
Post-Care Instructions: I reviewed with the patient in detail post-care instructions. Patient should stay away from the sun and wear sun protection until treated areas are fully healed.
Consent: Written consent obtained, risks reviewed including but not limited to crusting, scabbing, blistering, scarring, darker or lighter pigmentary change, bruising, and/or incomplete response.
Prep Text: The patient's skin was cleaned and prepped with steam and Lactifoam cleanser. After vacuum was performed weekly HA pads/ daily toner / moisture therapy and spf 45 were applied.
Wand: Medium
Price (Use Numbers Only, No Special Characters Or $): 0
Detail Level: Zone
Vacuum Pressure: [address]

## 2019-01-22 ENCOUNTER — APPOINTMENT (RX ONLY)
Dept: URBAN - METROPOLITAN AREA CLINIC 54 | Facility: CLINIC | Age: 24
Setting detail: DERMATOLOGY
End: 2019-01-22

## 2019-01-22 DIAGNOSIS — Z41.9 ENCOUNTER FOR PROCEDURE FOR PURPOSES OTHER THAN REMEDYING HEALTH STATE, UNSPECIFIED: ICD-10-CM

## 2019-01-22 DIAGNOSIS — L70.0 ACNE VULGARIS: ICD-10-CM

## 2019-01-22 PROCEDURE — ? COUNSELING

## 2019-01-22 PROCEDURE — ? CHEMICAL PEEL JESSNER/TCA

## 2019-01-22 PROCEDURE — 99213 OFFICE O/P EST LOW 20 MIN: CPT

## 2019-01-22 PROCEDURE — ? TREATMENT REGIMEN

## 2019-01-22 ASSESSMENT — LOCATION SIMPLE DESCRIPTION DERM
LOCATION SIMPLE: RIGHT CHEEK
LOCATION SIMPLE: CHIN
LOCATION SIMPLE: SUPERIOR FOREHEAD
LOCATION SIMPLE: LEFT CHEEK

## 2019-01-22 ASSESSMENT — LOCATION DETAILED DESCRIPTION DERM
LOCATION DETAILED: RIGHT CHIN
LOCATION DETAILED: LEFT CENTRAL MALAR CHEEK
LOCATION DETAILED: RIGHT CENTRAL MALAR CHEEK
LOCATION DETAILED: SUPERIOR MID FOREHEAD

## 2019-01-22 ASSESSMENT — LOCATION ZONE DERM: LOCATION ZONE: FACE

## 2019-01-22 NOTE — PROCEDURE: CHEMICAL PEEL JESSNER/TCA
Frost (0,1+,2+,3+,4+): 1+
Erythema: mild
Number Of Coats: 3
Time (Mins): 2
Treatment Number: 0
Consent: Prior to the procedure, written consent was obtained and risks were reviewed, including but not limited to: redness, peeling, blistering, pigmentary change, scarring, infection, and pain.
Post-Care Instructions: I reviewed with the patient in detail post-care instructions. Patient should avoid sun exposure and wear sun protection.
Chemical Peel: 15% TCA
Prep: Patientâs skin was cleaned thoroughly before applying the peel
Post Peel Care: After the procedure, the treatment area was washed with soap and water, and a post-peel cream was applied. Sun protection and post-care instructions were reviewed with the patient.
Detail Level: Detailed

## 2019-01-22 NOTE — PROCEDURE: TREATMENT REGIMEN
Detail Level: Zone
Continue Regimen: Clindamycin 1% Twice daily\\nBenzoyl peroxide 10% Twice daily\\nSulfacetamide Twice daily\\nDoryx  mg every other day.

## 2019-01-22 NOTE — PROCEDURE: MIPS QUALITY
Detail Level: Detailed
Quality 226: Preventive Care And Screening: Tobacco Use: Screening And Cessation Intervention: Tobacco Screening not Performed for Unknown Reasons
Quality 130: Documentation Of Current Medications In The Medical Record: Current Medications Documented
Quality 431: Preventive Care And Screening: Unhealthy Alcohol Use - Screening: Patient screened for unhealthy alcohol use using a single question and scores less than 2 times per year

## 2019-01-22 NOTE — PROCEDURE: COUNSELING
Tazorac Counseling:  Patient advised that medication is irritating and drying. Patient may need to apply sparingly and wash off after an hour before eventually leaving it on overnight. The patient verbalized understanding of the proper use and possible adverse effects of tazorac. All of the patient's questions and concerns were addressed.
Doxycycline Pregnancy And Lactation Text: This medication is Pregnancy Category D and not consider safe during pregnancy. It is also excreted in breast milk but is considered safe for shorter treatment courses.
Include Pregnancy/Lactation Warning?: No
Birth Control Pills Pregnancy And Lactation Text: This medication should be avoided if pregnant and for the first 30 days post-partum.
Detail Level: Detailed
Benzoyl Peroxide Counseling: Patient counseled that medicine may cause skin irritation and bleach clothing. In the event of skin irritation, the patient was advised to reduce the amount of the drug applied or use it less frequently. The patient verbalized understanding of the proper use and possible adverse effects of benzoyl peroxide. All of the patient's questions and concerns were addressed.
Bactrim Counseling:  I discussed with the patient the risks of sulfa antibiotics including but not limited to GI upset, allergic reaction, drug rash, diarrhea, dizziness, photosensitivity, and yeast infections. Rarely, more serious reactions can occur including but not limited to aplastic anemia, agranulocytosis, methemoglobinemia, blood dyscrasias, liver or kidney failure, lung infiltrates or desquamative/blistering drug rashes.
Spironolactone Counseling: Patient advised regarding risks of diarrhea, abdominal pain, hyperkalemia, birth defects (for female patients), liver toxicity and renal toxicity. The patient may need blood work to monitor liver and kidney function and potassium levels while on therapy. The patient verbalized understanding of the proper use and possible adverse effects of spironolactone. All of the patient's questions and concerns were addressed.
Topical Sulfur Applications Pregnancy And Lactation Text: This medication is Pregnancy Category C and has an unknown safety profile during pregnancy. It is unknown if this topical medication is excreted in breast milk.
High Dose Vitamin A Counseling: Side effects reviewed, pt to contact office should one occur.
Tazorac Pregnancy And Lactation Text: This medication is not safe during pregnancy. It is unknown if this medication is excreted in breast milk.
Benzoyl Peroxide Pregnancy And Lactation Text: This medication is Pregnancy Category C. It is unknown if benzoyl peroxide is excreted in breast milk.
Dapsone Counseling: I discussed with the patient the risks of dapsone including but not limited to hemolytic anemia, agranulocytosis, rashes, methemoglobinemia, kidney failure, peripheral neuropathy, headaches, GI upset, and liver toxicity. Patients who start dapsone require monitoring including baseline LFTs and weekly CBCs for the first month, then every month thereafter. The patient verbalized understanding of the proper use and possible adverse effects of dapsone. All of the patient's questions and concerns were addressed.
Erythromycin Counseling:  I discussed with the patient the risks of erythromycin including but not limited to GI upset, allergic reaction, drug rash, diarrhea, increase in liver enzymes, and yeast infections.
Bactrim Pregnancy And Lactation Text: This medication is Pregnancy Category D and is known to cause fetal risk. It is also excreted in breast milk.
Spironolactone Pregnancy And Lactation Text: This medication can cause feminization of the male fetus and should be avoided during pregnancy. The active metabolite is also found in breast milk.
High Dose Vitamin A Pregnancy And Lactation Text: High dose vitamin A therapy is contraindicated during pregnancy and breast feeding.
Erythromycin Pregnancy And Lactation Text: This medication is Pregnancy Category B and is considered safe during pregnancy. It is also excreted in breast milk.
Topical Clindamycin Counseling: Patient counseled that this medication may cause skin irritation or allergic reactions. In the event of skin irritation, the patient was advised to reduce the amount of the drug applied or use it less frequently. The patient verbalized understanding of the proper use and possible adverse effects of clindamycin. All of the patient's questions and concerns were addressed.
Topical Retinoid counseling:  Patient advised to apply a pea-sized amount only at bedtime and wait 30 minutes after washing their face before applying. If too drying, patient may add a non-comedogenic moisturizer. The patient verbalized understanding of the proper use and possible adverse effects of retinoids. All of the patient's questions and concerns were addressed.
Tetracycline Counseling: Patient counseled regarding possible photosensitivity and increased risk for sunburn. Patient instructed to avoid sunlight, if possible. When exposed to sunlight, patients should wear protective clothing, sunglasses, and sunscreen. The patient was instructed to call the office immediately if the following severe adverse effects occur:  hearing changes, easy bruising/bleeding, severe headache, or vision changes. The patient verbalized understanding of the proper use and possible adverse effects of tetracycline. All of the patient's questions and concerns were addressed. Patient understands to avoid pregnancy while on therapy due to potential birth defects.
Birth Control Pills Counseling: Birth Control Pill Counseling: I discussed with the patient the potential side effects of OCPs including but not limited to increased risk of stroke, heart attack, thrombophlebitis, deep venous thrombosis, hepatic adenomas, breast changes, GI upset, headaches, and depression. The patient verbalized understanding of the proper use and possible adverse effects of OCPs. All of the patient's questions and concerns were addressed.
Dapsone Pregnancy And Lactation Text: This medication is Pregnancy Category C and is not considered safe during pregnancy or breast feeding.
Minocycline Counseling: Patient advised regarding possible photosensitivity and discoloration of the teeth, skin, lips, tongue and gums. Patient instructed to avoid sunlight, if possible. When exposed to sunlight, patients should wear protective clothing, sunglasses, and sunscreen. The patient was instructed to call the office immediately if the following severe adverse effects occur:  hearing changes, easy bruising/bleeding, severe headache, or vision changes. The patient verbalized understanding of the proper use and possible adverse effects of minocycline. All of the patient's questions and concerns were addressed.
Azithromycin Counseling:  I discussed with the patient the risks of azithromycin including but not limited to GI upset, allergic reaction, drug rash, diarrhea, and yeast infections.
Topical Clindamycin Pregnancy And Lactation Text: This medication is Pregnancy Category B and is considered safe during pregnancy. It is unknown if it is excreted in breast milk.
Isotretinoin Counseling: Patient should get monthly blood tests, not donate blood, not drive at night if vision affected, not share medication, and not undergo elective surgery for 6 months after tx completed. Side effects reviewed, pt to contact office should one occur.
Doxycycline Counseling:  Patient counseled regarding possible photosensitivity and increased risk for sunburn. Patient instructed to avoid sunlight, if possible. When exposed to sunlight, patients should wear protective clothing, sunglasses, and sunscreen. The patient was instructed to call the office immediately if the following severe adverse effects occur:  hearing changes, easy bruising/bleeding, severe headache, or vision changes. The patient verbalized understanding of the proper use and possible adverse effects of doxycycline. All of the patient's questions and concerns were addressed.
Topical Retinoid Pregnancy And Lactation Text: This medication is Pregnancy Category C. It is unknown if this medication is excreted in breast milk.
Azithromycin Pregnancy And Lactation Text: This medication is considered safe during pregnancy and is also secreted in breast milk.
Isotretinoin Pregnancy And Lactation Text: This medication is Pregnancy Category X and is considered extremely dangerous during pregnancy. It is unknown if it is excreted in breast milk.
Topical Sulfur Applications Counseling: Topical Sulfur Counseling: Patient counseled that this medication may cause skin irritation or allergic reactions. In the event of skin irritation, the patient was advised to reduce the amount of the drug applied or use it less frequently. The patient verbalized understanding of the proper use and possible adverse effects of topical sulfur application. All of the patient's questions and concerns were addressed.
Tetracycline Pregnancy And Lactation Text: This medication is Pregnancy Category D and not consider safe during pregnancy. It is also excreted in breast milk.

## 2019-02-05 ENCOUNTER — APPOINTMENT (RX ONLY)
Dept: URBAN - METROPOLITAN AREA CLINIC 54 | Facility: CLINIC | Age: 24
Setting detail: DERMATOLOGY
End: 2019-02-05

## 2019-02-05 DIAGNOSIS — L70.0 ACNE VULGARIS: ICD-10-CM

## 2019-02-05 PROCEDURE — 10040 EXTRACTION: CPT

## 2019-02-05 PROCEDURE — ? COUNSELING

## 2019-02-05 PROCEDURE — ? ACNE SURGERY

## 2019-02-05 PROCEDURE — ? MICRODERMABRASION

## 2019-02-05 ASSESSMENT — LOCATION DETAILED DESCRIPTION DERM
LOCATION DETAILED: RIGHT CENTRAL MALAR CHEEK
LOCATION DETAILED: RIGHT MEDIAL FOREHEAD
LOCATION DETAILED: RIGHT INFERIOR CENTRAL MALAR CHEEK
LOCATION DETAILED: LEFT CHIN
LOCATION DETAILED: LEFT CENTRAL MALAR CHEEK
LOCATION DETAILED: LEFT FOREHEAD
LOCATION DETAILED: NASAL TIP
LOCATION DETAILED: LEFT SUPERIOR LATERAL BUCCAL CHEEK

## 2019-02-05 ASSESSMENT — LOCATION SIMPLE DESCRIPTION DERM
LOCATION SIMPLE: CHIN
LOCATION SIMPLE: RIGHT CHEEK
LOCATION SIMPLE: RIGHT FOREHEAD
LOCATION SIMPLE: LEFT FOREHEAD
LOCATION SIMPLE: NOSE
LOCATION SIMPLE: LEFT CHEEK

## 2019-02-05 ASSESSMENT — LOCATION ZONE DERM
LOCATION ZONE: NOSE
LOCATION ZONE: FACE

## 2019-02-05 NOTE — PROCEDURE: ACNE SURGERY
Detail Level: Zone
Extraction Method: 18 gauge needle, cotton-tipped applicators and gentle lateral pressure
Prep Text (Optional): Prior to extraction skin of the face was cleansed under steam using lactifoam cleanser
Render Number Of Lesions Treated: no
Consent was obtained and risks were reviewed including but not limited to scarring, infection, bleeding, scabbing, incomplete removal, and allergy to anesthesia.
Acne Type: Comedonal Lesions
Post-Care Instructions: I reviewed with the patient in detail post-care instructions. Patient is to keep the treatment areas dry overnight, and then apply bacitracin twice daily until healed. Patient may apply hydrogen peroxide soaks to remove any crusting.
Hemostasis: Pressure

## 2019-02-05 NOTE — PROCEDURE: MICRODERMABRASION
Number Of Passes: 2
Treatment Number: 0
Indication: acne
Vacuum Pressure: [address]
Consent: Written consent obtained, risks reviewed including but not limited to crusting, scabbing, blistering, scarring, darker or lighter pigmentary change, bruising, and/or incomplete response.
Detail Level: Zone
Post-Care Instructions: I reviewed with the patient in detail post-care instructions. Patient should stay away from the sun and wear sun protection until treated areas are fully healed.
Endpoint: mild erythema
Prep Text: The patient's skin was cleaned and prepped with steam and Lactifoam cleanser. After vacuum was performed weekly HA pads/ daily toner / moisture therapy and spf 45 were applied.
Wand: Medium

## 2019-02-05 NOTE — PROCEDURE: COUNSELING
Use Enhanced Medication Counseling?: No
Topical Retinoid Pregnancy And Lactation Text: This medication is Pregnancy Category C. It is unknown if this medication is excreted in breast milk.
Minocycline Pregnancy And Lactation Text: This medication is Pregnancy Category D and not consider safe during pregnancy. It is also excreted in breast milk.
Isotretinoin Pregnancy And Lactation Text: This medication is Pregnancy Category X and is considered extremely dangerous during pregnancy. It is unknown if it is excreted in breast milk.
Topical Clindamycin Pregnancy And Lactation Text: This medication is Pregnancy Category B and is considered safe during pregnancy. It is unknown if it is excreted in breast milk.
Isotretinoin Counseling: Patient should get monthly blood tests, not donate blood, not drive at night if vision affected, not share medication, and not undergo elective surgery for 6 months after tx completed. Side effects reviewed, pt to contact office should one occur.
Topical Clindamycin Counseling: Patient counseled that this medication may cause skin irritation or allergic reactions. In the event of skin irritation, the patient was advised to reduce the amount of the drug applied or use it less frequently. The patient verbalized understanding of the proper use and possible adverse effects of clindamycin. All of the patient's questions and concerns were addressed.
Minocycline Counseling: Patient advised regarding possible photosensitivity and discoloration of the teeth, skin, lips, tongue and gums. Patient instructed to avoid sunlight, if possible. When exposed to sunlight, patients should wear protective clothing, sunglasses, and sunscreen. The patient was instructed to call the office immediately if the following severe adverse effects occur:  hearing changes, easy bruising/bleeding, severe headache, or vision changes. The patient verbalized understanding of the proper use and possible adverse effects of minocycline. All of the patient's questions and concerns were addressed.
Azithromycin Counseling:  I discussed with the patient the risks of azithromycin including but not limited to GI upset, allergic reaction, drug rash, diarrhea, and yeast infections.
Topical Sulfur Applications Counseling: Topical Sulfur Counseling: Patient counseled that this medication may cause skin irritation or allergic reactions. In the event of skin irritation, the patient was advised to reduce the amount of the drug applied or use it less frequently. The patient verbalized understanding of the proper use and possible adverse effects of topical sulfur application. All of the patient's questions and concerns were addressed.
Spironolactone Counseling: Patient advised regarding risks of diarrhea, abdominal pain, hyperkalemia, birth defects (for female patients), liver toxicity and renal toxicity. The patient may need blood work to monitor liver and kidney function and potassium levels while on therapy. The patient verbalized understanding of the proper use and possible adverse effects of spironolactone. All of the patient's questions and concerns were addressed.
Benzoyl Peroxide Counseling: Patient counseled that medicine may cause skin irritation and bleach clothing. In the event of skin irritation, the patient was advised to reduce the amount of the drug applied or use it less frequently. The patient verbalized understanding of the proper use and possible adverse effects of benzoyl peroxide. All of the patient's questions and concerns were addressed.
Dapsone Counseling: I discussed with the patient the risks of dapsone including but not limited to hemolytic anemia, agranulocytosis, rashes, methemoglobinemia, kidney failure, peripheral neuropathy, headaches, GI upset, and liver toxicity. Patients who start dapsone require monitoring including baseline LFTs and weekly CBCs for the first month, then every month thereafter. The patient verbalized understanding of the proper use and possible adverse effects of dapsone. All of the patient's questions and concerns were addressed.
Birth Control Pills Pregnancy And Lactation Text: This medication should be avoided if pregnant and for the first 30 days post-partum.
Azithromycin Pregnancy And Lactation Text: This medication is considered safe during pregnancy and is also secreted in breast milk.
Doxycycline Pregnancy And Lactation Text: This medication is Pregnancy Category D and not consider safe during pregnancy. It is also excreted in breast milk but is considered safe for shorter treatment courses.
Tazorac Pregnancy And Lactation Text: This medication is not safe during pregnancy. It is unknown if this medication is excreted in breast milk.
High Dose Vitamin A Pregnancy And Lactation Text: High dose vitamin A therapy is contraindicated during pregnancy and breast feeding.
Topical Sulfur Applications Pregnancy And Lactation Text: This medication is Pregnancy Category C and has an unknown safety profile during pregnancy. It is unknown if this topical medication is excreted in breast milk.
High Dose Vitamin A Counseling: Side effects reviewed, pt to contact office should one occur.
Tazorac Counseling:  Patient advised that medication is irritating and drying. Patient may need to apply sparingly and wash off after an hour before eventually leaving it on overnight. The patient verbalized understanding of the proper use and possible adverse effects of tazorac. All of the patient's questions and concerns were addressed.
Erythromycin Counseling:  I discussed with the patient the risks of erythromycin including but not limited to GI upset, allergic reaction, drug rash, diarrhea, increase in liver enzymes, and yeast infections.
Bactrim Counseling:  I discussed with the patient the risks of sulfa antibiotics including but not limited to GI upset, allergic reaction, drug rash, diarrhea, dizziness, photosensitivity, and yeast infections. Rarely, more serious reactions can occur including but not limited to aplastic anemia, agranulocytosis, methemoglobinemia, blood dyscrasias, liver or kidney failure, lung infiltrates or desquamative/blistering drug rashes.
Tetracycline Counseling: Patient counseled regarding possible photosensitivity and increased risk for sunburn. Patient instructed to avoid sunlight, if possible. When exposed to sunlight, patients should wear protective clothing, sunglasses, and sunscreen. The patient was instructed to call the office immediately if the following severe adverse effects occur:  hearing changes, easy bruising/bleeding, severe headache, or vision changes. The patient verbalized understanding of the proper use and possible adverse effects of tetracycline. All of the patient's questions and concerns were addressed. Patient understands to avoid pregnancy while on therapy due to potential birth defects.
Doxycycline Counseling:  Patient counseled regarding possible photosensitivity and increased risk for sunburn. Patient instructed to avoid sunlight, if possible. When exposed to sunlight, patients should wear protective clothing, sunglasses, and sunscreen. The patient was instructed to call the office immediately if the following severe adverse effects occur:  hearing changes, easy bruising/bleeding, severe headache, or vision changes. The patient verbalized understanding of the proper use and possible adverse effects of doxycycline. All of the patient's questions and concerns were addressed.
Topical Retinoid counseling:  Patient advised to apply a pea-sized amount only at bedtime and wait 30 minutes after washing their face before applying. If too drying, patient may add a non-comedogenic moisturizer. The patient verbalized understanding of the proper use and possible adverse effects of retinoids. All of the patient's questions and concerns were addressed.
Birth Control Pills Counseling: Birth Control Pill Counseling: I discussed with the patient the potential side effects of OCPs including but not limited to increased risk of stroke, heart attack, thrombophlebitis, deep venous thrombosis, hepatic adenomas, breast changes, GI upset, headaches, and depression. The patient verbalized understanding of the proper use and possible adverse effects of OCPs. All of the patient's questions and concerns were addressed.
Spironolactone Pregnancy And Lactation Text: This medication can cause feminization of the male fetus and should be avoided during pregnancy. The active metabolite is also found in breast milk.
Bactrim Pregnancy And Lactation Text: This medication is Pregnancy Category D and is known to cause fetal risk. It is also excreted in breast milk.
Erythromycin Pregnancy And Lactation Text: This medication is Pregnancy Category B and is considered safe during pregnancy. It is also excreted in breast milk.
Dapsone Pregnancy And Lactation Text: This medication is Pregnancy Category C and is not considered safe during pregnancy or breast feeding.
Benzoyl Peroxide Pregnancy And Lactation Text: This medication is Pregnancy Category C. It is unknown if benzoyl peroxide is excreted in breast milk.
Detail Level: Detailed

## 2019-02-19 ENCOUNTER — APPOINTMENT (RX ONLY)
Dept: URBAN - METROPOLITAN AREA CLINIC 54 | Facility: CLINIC | Age: 24
Setting detail: DERMATOLOGY
End: 2019-02-19

## 2019-02-19 DIAGNOSIS — L70.0 ACNE VULGARIS: ICD-10-CM

## 2019-02-19 DIAGNOSIS — Z41.9 ENCOUNTER FOR PROCEDURE FOR PURPOSES OTHER THAN REMEDYING HEALTH STATE, UNSPECIFIED: ICD-10-CM

## 2019-02-19 PROCEDURE — ? CHEMICAL PEEL JESSNER/TCA

## 2019-02-19 PROCEDURE — ? OTHER

## 2019-02-19 PROCEDURE — ? COUNSELING

## 2019-02-19 PROCEDURE — ? TREATMENT REGIMEN

## 2019-02-19 PROCEDURE — 99213 OFFICE O/P EST LOW 20 MIN: CPT

## 2019-02-19 NOTE — PROCEDURE: CHEMICAL PEEL JESSNER/TCA
Prep: Patientâs skin was cleaned thoroughly before applying the peel
Post Peel Care: After the procedure, the treatment area was washed with soap and water, and a post-peel cream was applied. Sun protection and post-care instructions were reviewed with the patient.
Consent: Prior to the procedure, written consent was obtained and risks were reviewed, including but not limited to: redness, peeling, blistering, pigmentary change, scarring, infection, and pain.
Number Of Coats: 2
Frost (0,1+,2+,3+,4+): 1+
Price (Use Numbers Only, No Special Characters Or $): 0
Chemical Peel: 17.5% TCA
Detail Level: Detailed
Post-Care Instructions: I reviewed with the patient in detail post-care instructions. Patient should avoid sun exposure and wear sun protection.
Erythema: mild

## 2019-02-19 NOTE — PROCEDURE: MIPS QUALITY
Quality 130: Documentation Of Current Medications In The Medical Record: Current Medications Documented
Quality 226: Preventive Care And Screening: Tobacco Use: Screening And Cessation Intervention: Tobacco Screening not Performed for Unknown Reasons
Quality 110: Preventive Care And Screening: Influenza Immunization: Influenza immunization was not ordered or administered, reason not given
Quality 431: Preventive Care And Screening: Unhealthy Alcohol Use - Screening: Patient screened for unhealthy alcohol use using a single question and scores less than 2 times per year
Detail Level: Detailed

## 2019-02-19 NOTE — PROCEDURE: TREATMENT REGIMEN
Continue Regimen: Clindamycin 1% solution twice daily. \\nBenzoyl peroxide 10% twice daily. \\ndoryx  mg every other day.
Detail Level: Zone

## 2019-02-19 NOTE — PROCEDURE: OTHER
Other (Free Text): Chemical peel done by [facility].
Note Text (......Xxx Chief Complaint.): This diagnosis correlates with the
Detail Level: Zone

## 2019-02-28 ENCOUNTER — APPOINTMENT (RX ONLY)
Dept: URBAN - METROPOLITAN AREA CLINIC 54 | Facility: CLINIC | Age: 24
Setting detail: DERMATOLOGY
End: 2019-02-28

## 2019-02-28 DIAGNOSIS — L66.89 OTHER CICATRICIAL ALOPECIA: ICD-10-CM

## 2019-02-28 DIAGNOSIS — L29.8 OTHER PRURITUS: ICD-10-CM

## 2019-02-28 DIAGNOSIS — L29.89 OTHER PRURITUS: ICD-10-CM

## 2019-02-28 DIAGNOSIS — L66.8 OTHER CICATRICIAL ALOPECIA: ICD-10-CM

## 2019-02-28 PROCEDURE — ? COUNSELING

## 2019-02-28 PROCEDURE — ? TREATMENT REGIMEN

## 2019-02-28 PROCEDURE — 99213 OFFICE O/P EST LOW 20 MIN: CPT | Mod: 25

## 2019-02-28 PROCEDURE — 11900 INJECT SKIN LESIONS </W 7: CPT

## 2019-02-28 PROCEDURE — ? INTRALESIONAL KENALOG

## 2019-02-28 ASSESSMENT — LOCATION SIMPLE DESCRIPTION DERM: LOCATION SIMPLE: SCALP

## 2019-02-28 ASSESSMENT — LOCATION DETAILED DESCRIPTION DERM: LOCATION DETAILED: LEFT SUPERIOR PARIETAL SCALP

## 2019-02-28 ASSESSMENT — LOCATION ZONE DERM: LOCATION ZONE: SCALP

## 2019-02-28 NOTE — PROCEDURE: MIPS QUALITY
Quality 431: Preventive Care And Screening: Unhealthy Alcohol Use - Screening: Patient screened for unhealthy alcohol use using a single question and scores less than 2 times per year
Detail Level: Detailed
Quality 110: Preventive Care And Screening: Influenza Immunization: Influenza immunization was not ordered or administered, reason not given
Quality 226: Preventive Care And Screening: Tobacco Use: Screening And Cessation Intervention: Tobacco Screening not Performed for Unknown Reasons
Quality 130: Documentation Of Current Medications In The Medical Record: Current Medications Documented

## 2019-02-28 NOTE — PROCEDURE: INTRALESIONAL KENALOG
Administered By (Optional): Minna Cason
Include Z78.9 (Other Specified Conditions Influencing Health Status) As An Associated Diagnosis?: No
Detail Level: Detailed
Medical Necessity Clause: This procedure was medically necessary because the lesions that were treated were:
X Size Of Lesion In Cm (Optional): 0
Concentration Of Solution Injected (Mg/Ml): 10.0
Consent: The risks of atrophy were reviewed with the patient.
Kenalog Preparation: Kenalog
Total Volume Injected (Ccs- Only Use Numbers And Decimals): 1.0
Ndc# For Kenalog Only: 5931-9141-21

## 2019-03-05 ENCOUNTER — APPOINTMENT (RX ONLY)
Dept: URBAN - METROPOLITAN AREA CLINIC 54 | Facility: CLINIC | Age: 24
Setting detail: DERMATOLOGY
End: 2019-03-05

## 2019-03-05 DIAGNOSIS — L70.0 ACNE VULGARIS: ICD-10-CM

## 2019-03-05 PROCEDURE — ? ACNE SURGERY

## 2019-03-05 PROCEDURE — ? MICRODERMABRASION

## 2019-03-05 PROCEDURE — ? COUNSELING

## 2019-03-05 PROCEDURE — 10040 EXTRACTION: CPT

## 2019-03-05 ASSESSMENT — LOCATION ZONE DERM
LOCATION ZONE: FACE
LOCATION ZONE: NOSE

## 2019-03-05 ASSESSMENT — LOCATION DETAILED DESCRIPTION DERM
LOCATION DETAILED: RIGHT CENTRAL MALAR CHEEK
LOCATION DETAILED: LEFT CENTRAL MALAR CHEEK
LOCATION DETAILED: INFERIOR MID FOREHEAD
LOCATION DETAILED: RIGHT INFERIOR CENTRAL MALAR CHEEK
LOCATION DETAILED: LEFT INFERIOR CENTRAL MALAR CHEEK
LOCATION DETAILED: LEFT CHIN
LOCATION DETAILED: NASAL SUPRATIP

## 2019-03-05 ASSESSMENT — LOCATION SIMPLE DESCRIPTION DERM
LOCATION SIMPLE: INFERIOR FOREHEAD
LOCATION SIMPLE: CHIN
LOCATION SIMPLE: RIGHT CHEEK
LOCATION SIMPLE: NOSE
LOCATION SIMPLE: LEFT CHEEK

## 2019-03-05 NOTE — PROCEDURE: ACNE SURGERY
Hemostasis: Pressure
Extraction Method: 18 gauge needle, cotton-tipped applicators and gentle lateral pressure
Detail Level: Zone
Acne Type: Comedonal Lesions
Post-Care Instructions: I reviewed with the patient in detail post-care instructions. Patient is to keep the treatment areas dry overnight, and then apply bacitracin twice daily until healed. Patient may apply hydrogen peroxide soaks to remove any crusting.
Render Post-Care Instructions In Note?: no
Prep Text (Optional): Prior to extraction skin of the face was cleansed under steam using lactifoam cleanser
Consent was obtained and risks were reviewed including but not limited to scarring, infection, bleeding, scabbing, incomplete removal, and allergy to anesthesia.

## 2019-03-05 NOTE — PROCEDURE: MICRODERMABRASION
Treatment Number: 0
Vacuum Pressure: [address]
Endpoint: mild erythema
Prep Text: The patient's skin was cleaned and prepped with steam and Lactifoam cleanser. After vacuum was performed weekly HA pads/ daily toner / moisture therapy and spf 45 were applied.
Indication: acne
Detail Level: Zone
Consent: Written consent obtained, risks reviewed including but not limited to crusting, scabbing, blistering, scarring, darker or lighter pigmentary change, bruising, and/or incomplete response.
Number Of Passes: 2
Post-Care Instructions: I reviewed with the patient in detail post-care instructions. Patient should stay away from the sun and wear sun protection until treated areas are fully healed.
Wand: Medium

## 2019-03-05 NOTE — PROCEDURE: COUNSELING
Topical Clindamycin Pregnancy And Lactation Text: This medication is Pregnancy Category B and is considered safe during pregnancy. It is unknown if it is excreted in breast milk.
Topical Retinoid Pregnancy And Lactation Text: This medication is Pregnancy Category C. It is unknown if this medication is excreted in breast milk.
Minocycline Pregnancy And Lactation Text: This medication is Pregnancy Category D and not consider safe during pregnancy. It is also excreted in breast milk.
Azithromycin Counseling:  I discussed with the patient the risks of azithromycin including but not limited to GI upset, allergic reaction, drug rash, diarrhea, and yeast infections.
Tetracycline Counseling: Patient counseled regarding possible photosensitivity and increased risk for sunburn. Patient instructed to avoid sunlight, if possible. When exposed to sunlight, patients should wear protective clothing, sunglasses, and sunscreen. The patient was instructed to call the office immediately if the following severe adverse effects occur:  hearing changes, easy bruising/bleeding, severe headache, or vision changes. The patient verbalized understanding of the proper use and possible adverse effects of tetracycline. All of the patient's questions and concerns were addressed. Patient understands to avoid pregnancy while on therapy due to potential birth defects.
Minocycline Counseling: Patient advised regarding possible photosensitivity and discoloration of the teeth, skin, lips, tongue and gums. Patient instructed to avoid sunlight, if possible. When exposed to sunlight, patients should wear protective clothing, sunglasses, and sunscreen. The patient was instructed to call the office immediately if the following severe adverse effects occur:  hearing changes, easy bruising/bleeding, severe headache, or vision changes. The patient verbalized understanding of the proper use and possible adverse effects of minocycline. All of the patient's questions and concerns were addressed.
Birth Control Pills Counseling: Birth Control Pill Counseling: I discussed with the patient the potential side effects of OCPs including but not limited to increased risk of stroke, heart attack, thrombophlebitis, deep venous thrombosis, hepatic adenomas, breast changes, GI upset, headaches, and depression. The patient verbalized understanding of the proper use and possible adverse effects of OCPs. All of the patient's questions and concerns were addressed.
Doxycycline Counseling:  Patient counseled regarding possible photosensitivity and increased risk for sunburn. Patient instructed to avoid sunlight, if possible. When exposed to sunlight, patients should wear protective clothing, sunglasses, and sunscreen. The patient was instructed to call the office immediately if the following severe adverse effects occur:  hearing changes, easy bruising/bleeding, severe headache, or vision changes. The patient verbalized understanding of the proper use and possible adverse effects of doxycycline. All of the patient's questions and concerns were addressed.
Use Enhanced Medication Counseling?: No
Topical Sulfur Applications Counseling: Topical Sulfur Counseling: Patient counseled that this medication may cause skin irritation or allergic reactions. In the event of skin irritation, the patient was advised to reduce the amount of the drug applied or use it less frequently. The patient verbalized understanding of the proper use and possible adverse effects of topical sulfur application. All of the patient's questions and concerns were addressed.
Tazorac Counseling:  Patient advised that medication is irritating and drying. Patient may need to apply sparingly and wash off after an hour before eventually leaving it on overnight. The patient verbalized understanding of the proper use and possible adverse effects of tazorac. All of the patient's questions and concerns were addressed.
Benzoyl Peroxide Counseling: Patient counseled that medicine may cause skin irritation and bleach clothing. In the event of skin irritation, the patient was advised to reduce the amount of the drug applied or use it less frequently. The patient verbalized understanding of the proper use and possible adverse effects of benzoyl peroxide. All of the patient's questions and concerns were addressed.
Detail Level: Detailed
High Dose Vitamin A Counseling: Side effects reviewed, pt to contact office should one occur.
Doxycycline Pregnancy And Lactation Text: This medication is Pregnancy Category D and not consider safe during pregnancy. It is also excreted in breast milk but is considered safe for shorter treatment courses.
Isotretinoin Pregnancy And Lactation Text: This medication is Pregnancy Category X and is considered extremely dangerous during pregnancy. It is unknown if it is excreted in breast milk.
Azithromycin Pregnancy And Lactation Text: This medication is considered safe during pregnancy and is also secreted in breast milk.
Birth Control Pills Pregnancy And Lactation Text: This medication should be avoided if pregnant and for the first 30 days post-partum.
Tazorac Pregnancy And Lactation Text: This medication is not safe during pregnancy. It is unknown if this medication is excreted in breast milk.
Benzoyl Peroxide Pregnancy And Lactation Text: This medication is Pregnancy Category C. It is unknown if benzoyl peroxide is excreted in breast milk.
Dapsone Pregnancy And Lactation Text: This medication is Pregnancy Category C and is not considered safe during pregnancy or breast feeding.
Spironolactone Pregnancy And Lactation Text: This medication can cause feminization of the male fetus and should be avoided during pregnancy. The active metabolite is also found in breast milk.
Bactrim Counseling:  I discussed with the patient the risks of sulfa antibiotics including but not limited to GI upset, allergic reaction, drug rash, diarrhea, dizziness, photosensitivity, and yeast infections. Rarely, more serious reactions can occur including but not limited to aplastic anemia, agranulocytosis, methemoglobinemia, blood dyscrasias, liver or kidney failure, lung infiltrates or desquamative/blistering drug rashes.
Spironolactone Counseling: Patient advised regarding risks of diarrhea, abdominal pain, hyperkalemia, birth defects (for female patients), liver toxicity and renal toxicity. The patient may need blood work to monitor liver and kidney function and potassium levels while on therapy. The patient verbalized understanding of the proper use and possible adverse effects of spironolactone. All of the patient's questions and concerns were addressed.
Dapsone Counseling: I discussed with the patient the risks of dapsone including but not limited to hemolytic anemia, agranulocytosis, rashes, methemoglobinemia, kidney failure, peripheral neuropathy, headaches, GI upset, and liver toxicity. Patients who start dapsone require monitoring including baseline LFTs and weekly CBCs for the first month, then every month thereafter. The patient verbalized understanding of the proper use and possible adverse effects of dapsone. All of the patient's questions and concerns were addressed.
Erythromycin Counseling:  I discussed with the patient the risks of erythromycin including but not limited to GI upset, allergic reaction, drug rash, diarrhea, increase in liver enzymes, and yeast infections.
Topical Sulfur Applications Pregnancy And Lactation Text: This medication is Pregnancy Category C and has an unknown safety profile during pregnancy. It is unknown if this topical medication is excreted in breast milk.
Topical Clindamycin Counseling: Patient counseled that this medication may cause skin irritation or allergic reactions. In the event of skin irritation, the patient was advised to reduce the amount of the drug applied or use it less frequently. The patient verbalized understanding of the proper use and possible adverse effects of clindamycin. All of the patient's questions and concerns were addressed.
Topical Retinoid counseling:  Patient advised to apply a pea-sized amount only at bedtime and wait 30 minutes after washing their face before applying. If too drying, patient may add a non-comedogenic moisturizer. The patient verbalized understanding of the proper use and possible adverse effects of retinoids. All of the patient's questions and concerns were addressed.
Isotretinoin Counseling: Patient should get monthly blood tests, not donate blood, not drive at night if vision affected, not share medication, and not undergo elective surgery for 6 months after tx completed. Side effects reviewed, pt to contact office should one occur.
Erythromycin Pregnancy And Lactation Text: This medication is Pregnancy Category B and is considered safe during pregnancy. It is also excreted in breast milk.
High Dose Vitamin A Pregnancy And Lactation Text: High dose vitamin A therapy is contraindicated during pregnancy and breast feeding.
Bactrim Pregnancy And Lactation Text: This medication is Pregnancy Category D and is known to cause fetal risk. It is also excreted in breast milk.

## 2019-03-19 ENCOUNTER — APPOINTMENT (RX ONLY)
Dept: URBAN - METROPOLITAN AREA CLINIC 54 | Facility: CLINIC | Age: 24
Setting detail: DERMATOLOGY
End: 2019-03-19

## 2019-03-19 DIAGNOSIS — L70.0 ACNE VULGARIS: ICD-10-CM | Status: IMPROVED

## 2019-03-19 DIAGNOSIS — L66.8 OTHER CICATRICIAL ALOPECIA: ICD-10-CM | Status: RESOLVED

## 2019-03-19 DIAGNOSIS — Z41.9 ENCOUNTER FOR PROCEDURE FOR PURPOSES OTHER THAN REMEDYING HEALTH STATE, UNSPECIFIED: ICD-10-CM

## 2019-03-19 DIAGNOSIS — L66.89 OTHER CICATRICIAL ALOPECIA: ICD-10-CM | Status: RESOLVED

## 2019-03-19 PROCEDURE — 99213 OFFICE O/P EST LOW 20 MIN: CPT

## 2019-03-19 PROCEDURE — ? TREATMENT REGIMEN

## 2019-03-19 PROCEDURE — ? CHEMICAL PEEL JESSNER/TCA

## 2019-03-19 PROCEDURE — ? COUNSELING

## 2019-03-19 ASSESSMENT — LOCATION ZONE DERM
LOCATION ZONE: FACE
LOCATION ZONE: LIP
LOCATION ZONE: SCALP

## 2019-03-19 ASSESSMENT — LOCATION SIMPLE DESCRIPTION DERM
LOCATION SIMPLE: RIGHT LIP
LOCATION SIMPLE: RIGHT FOREHEAD
LOCATION SIMPLE: LEFT LIP
LOCATION SIMPLE: SUPERIOR FOREHEAD
LOCATION SIMPLE: RIGHT CHEEK
LOCATION SIMPLE: RIGHT SCALP
LOCATION SIMPLE: INFERIOR FOREHEAD
LOCATION SIMPLE: LEFT CHEEK

## 2019-03-19 ASSESSMENT — LOCATION DETAILED DESCRIPTION DERM
LOCATION DETAILED: RIGHT CENTRAL FRONTAL SCALP
LOCATION DETAILED: LEFT CENTRAL MALAR CHEEK
LOCATION DETAILED: INFERIOR MID FOREHEAD
LOCATION DETAILED: SUPERIOR MID FOREHEAD
LOCATION DETAILED: RIGHT INFERIOR CENTRAL MALAR CHEEK
LOCATION DETAILED: RIGHT LOWER CUTANEOUS LIP
LOCATION DETAILED: LEFT LOWER CUTANEOUS LIP
LOCATION DETAILED: RIGHT SUPERIOR FOREHEAD
LOCATION DETAILED: RIGHT MEDIAL FRONTAL SCALP

## 2019-03-19 NOTE — PROCEDURE: CHEMICAL PEEL JESSNER/TCA
Consent: Prior to the procedure, written consent was obtained and risks were reviewed, including but not limited to: redness, peeling, blistering, pigmentary change, scarring, infection, and pain.
Detail Level: Detailed
Chemical Peel: 17.5% TCA
Post Peel Care: After the procedure, the treatment area was washed with soap and water, and a post-peel cream was applied. Sun protection and post-care instructions were reviewed with the patient.
Number Of Coats: 2
Treatment Number: 0
Post-Care Instructions: I reviewed with the patient in detail post-care instructions. Patient should avoid sun exposure and wear sun protection.
Frost (0,1+,2+,3+,4+): 1+
Erythema: mild
Prep: Patientâs skin was cleaned thoroughly before applying the peel

## 2019-03-19 NOTE — PROCEDURE: MIPS QUALITY
Quality 226: Preventive Care And Screening: Tobacco Use: Screening And Cessation Intervention: Tobacco Screening not Performed for Medical Reasons
Quality 130: Documentation Of Current Medications In The Medical Record: Current Medications Documented
Detail Level: Detailed
Quality 431: Preventive Care And Screening: Unhealthy Alcohol Use - Screening: Patient screened for unhealthy alcohol use using a single question and scores less than 2 times per year
Quality 110: Preventive Care And Screening: Influenza Immunization: Influenza Immunization Administered during Influenza season

## 2019-03-19 NOTE — PROCEDURE: COUNSELING
Minocycline Pregnancy And Lactation Text: This medication is Pregnancy Category D and not consider safe during pregnancy. It is also excreted in breast milk.
Benzoyl Peroxide Counseling: Patient counseled that medicine may cause skin irritation and bleach clothing. In the event of skin irritation, the patient was advised to reduce the amount of the drug applied or use it less frequently. The patient verbalized understanding of the proper use and possible adverse effects of benzoyl peroxide. All of the patient's questions and concerns were addressed.
Isotretinoin Counseling: Patient should get monthly blood tests, not donate blood, not drive at night if vision affected, not share medication, and not undergo elective surgery for 6 months after tx completed. Side effects reviewed, pt to contact office should one occur.
Dapsone Pregnancy And Lactation Text: This medication is Pregnancy Category C and is not considered safe during pregnancy or breast feeding.
Topical Sulfur Applications Pregnancy And Lactation Text: This medication is Pregnancy Category C and has an unknown safety profile during pregnancy. It is unknown if this topical medication is excreted in breast milk.
Tazorac Counseling:  Patient advised that medication is irritating and drying. Patient may need to apply sparingly and wash off after an hour before eventually leaving it on overnight. The patient verbalized understanding of the proper use and possible adverse effects of tazorac. All of the patient's questions and concerns were addressed.
Include Pregnancy/Lactation Warning?: No
Birth Control Pills Counseling: Birth Control Pill Counseling: I discussed with the patient the potential side effects of OCPs including but not limited to increased risk of stroke, heart attack, thrombophlebitis, deep venous thrombosis, hepatic adenomas, breast changes, GI upset, headaches, and depression. The patient verbalized understanding of the proper use and possible adverse effects of OCPs. All of the patient's questions and concerns were addressed.
Topical Clindamycin Counseling: Patient counseled that this medication may cause skin irritation or allergic reactions. In the event of skin irritation, the patient was advised to reduce the amount of the drug applied or use it less frequently. The patient verbalized understanding of the proper use and possible adverse effects of clindamycin. All of the patient's questions and concerns were addressed.
Spironolactone Counseling: Patient advised regarding risks of diarrhea, abdominal pain, hyperkalemia, birth defects (for female patients), liver toxicity and renal toxicity. The patient may need blood work to monitor liver and kidney function and potassium levels while on therapy. The patient verbalized understanding of the proper use and possible adverse effects of spironolactone. All of the patient's questions and concerns were addressed.
Benzoyl Peroxide Pregnancy And Lactation Text: This medication is Pregnancy Category C. It is unknown if benzoyl peroxide is excreted in breast milk.
Doxycycline Counseling:  Patient counseled regarding possible photosensitivity and increased risk for sunburn. Patient instructed to avoid sunlight, if possible. When exposed to sunlight, patients should wear protective clothing, sunglasses, and sunscreen. The patient was instructed to call the office immediately if the following severe adverse effects occur:  hearing changes, easy bruising/bleeding, severe headache, or vision changes. The patient verbalized understanding of the proper use and possible adverse effects of doxycycline. All of the patient's questions and concerns were addressed.
Isotretinoin Pregnancy And Lactation Text: This medication is Pregnancy Category X and is considered extremely dangerous during pregnancy. It is unknown if it is excreted in breast milk.
Tazorac Pregnancy And Lactation Text: This medication is not safe during pregnancy. It is unknown if this medication is excreted in breast milk.
Bactrim Pregnancy And Lactation Text: This medication is Pregnancy Category D and is known to cause fetal risk. It is also excreted in breast milk.
Birth Control Pills Pregnancy And Lactation Text: This medication should be avoided if pregnant and for the first 30 days post-partum.
Erythromycin Counseling:  I discussed with the patient the risks of erythromycin including but not limited to GI upset, allergic reaction, drug rash, diarrhea, increase in liver enzymes, and yeast infections.
Topical Retinoid counseling:  Patient advised to apply a pea-sized amount only at bedtime and wait 30 minutes after washing their face before applying. If too drying, patient may add a non-comedogenic moisturizer. The patient verbalized understanding of the proper use and possible adverse effects of retinoids. All of the patient's questions and concerns were addressed.
Topical Clindamycin Pregnancy And Lactation Text: This medication is Pregnancy Category B and is considered safe during pregnancy. It is unknown if it is excreted in breast milk.
Azithromycin Counseling:  I discussed with the patient the risks of azithromycin including but not limited to GI upset, allergic reaction, drug rash, diarrhea, and yeast infections.
High Dose Vitamin A Counseling: Side effects reviewed, pt to contact office should one occur.
Spironolactone Pregnancy And Lactation Text: This medication can cause feminization of the male fetus and should be avoided during pregnancy. The active metabolite is also found in breast milk.
Doxycycline Pregnancy And Lactation Text: This medication is Pregnancy Category D and not consider safe during pregnancy. It is also excreted in breast milk but is considered safe for shorter treatment courses.
Erythromycin Pregnancy And Lactation Text: This medication is Pregnancy Category B and is considered safe during pregnancy. It is also excreted in breast milk.
Bactrim Counseling:  I discussed with the patient the risks of sulfa antibiotics including but not limited to GI upset, allergic reaction, drug rash, diarrhea, dizziness, photosensitivity, and yeast infections. Rarely, more serious reactions can occur including but not limited to aplastic anemia, agranulocytosis, methemoglobinemia, blood dyscrasias, liver or kidney failure, lung infiltrates or desquamative/blistering drug rashes.
Minocycline Counseling: Patient advised regarding possible photosensitivity and discoloration of the teeth, skin, lips, tongue and gums. Patient instructed to avoid sunlight, if possible. When exposed to sunlight, patients should wear protective clothing, sunglasses, and sunscreen. The patient was instructed to call the office immediately if the following severe adverse effects occur:  hearing changes, easy bruising/bleeding, severe headache, or vision changes. The patient verbalized understanding of the proper use and possible adverse effects of minocycline. All of the patient's questions and concerns were addressed.
Topical Sulfur Applications Counseling: Topical Sulfur Counseling: Patient counseled that this medication may cause skin irritation or allergic reactions. In the event of skin irritation, the patient was advised to reduce the amount of the drug applied or use it less frequently. The patient verbalized understanding of the proper use and possible adverse effects of topical sulfur application. All of the patient's questions and concerns were addressed.
Dapsone Counseling: I discussed with the patient the risks of dapsone including but not limited to hemolytic anemia, agranulocytosis, rashes, methemoglobinemia, kidney failure, peripheral neuropathy, headaches, GI upset, and liver toxicity. Patients who start dapsone require monitoring including baseline LFTs and weekly CBCs for the first month, then every month thereafter. The patient verbalized understanding of the proper use and possible adverse effects of dapsone. All of the patient's questions and concerns were addressed.
Detail Level: Detailed
Topical Retinoid Pregnancy And Lactation Text: This medication is Pregnancy Category C. It is unknown if this medication is excreted in breast milk.
Azithromycin Pregnancy And Lactation Text: This medication is considered safe during pregnancy and is also secreted in breast milk.
High Dose Vitamin A Pregnancy And Lactation Text: High dose vitamin A therapy is contraindicated during pregnancy and breast feeding.
Tetracycline Counseling: Patient counseled regarding possible photosensitivity and increased risk for sunburn. Patient instructed to avoid sunlight, if possible. When exposed to sunlight, patients should wear protective clothing, sunglasses, and sunscreen. The patient was instructed to call the office immediately if the following severe adverse effects occur:  hearing changes, easy bruising/bleeding, severe headache, or vision changes. The patient verbalized understanding of the proper use and possible adverse effects of tetracycline. All of the patient's questions and concerns were addressed. Patient understands to avoid pregnancy while on therapy due to potential birth defects.

## 2019-04-02 ENCOUNTER — APPOINTMENT (RX ONLY)
Dept: URBAN - METROPOLITAN AREA CLINIC 54 | Facility: CLINIC | Age: 24
Setting detail: DERMATOLOGY
End: 2019-04-02

## 2019-04-02 DIAGNOSIS — L70.0 ACNE VULGARIS: ICD-10-CM

## 2019-04-02 PROCEDURE — 10040 EXTRACTION: CPT

## 2019-04-02 PROCEDURE — ? MICRODERMABRASION

## 2019-04-02 PROCEDURE — ? ACNE SURGERY

## 2019-04-02 ASSESSMENT — LOCATION SIMPLE DESCRIPTION DERM
LOCATION SIMPLE: RIGHT LIP
LOCATION SIMPLE: LEFT CHEEK
LOCATION SIMPLE: RIGHT CHEEK
LOCATION SIMPLE: CHIN
LOCATION SIMPLE: LEFT FOREHEAD

## 2019-04-02 ASSESSMENT — LOCATION ZONE DERM
LOCATION ZONE: LIP
LOCATION ZONE: FACE

## 2019-04-02 ASSESSMENT — LOCATION DETAILED DESCRIPTION DERM
LOCATION DETAILED: LEFT INFERIOR MEDIAL BUCCAL CHEEK
LOCATION DETAILED: LEFT MEDIAL FOREHEAD
LOCATION DETAILED: LEFT CENTRAL MALAR CHEEK
LOCATION DETAILED: RIGHT LOWER CUTANEOUS LIP
LOCATION DETAILED: RIGHT CENTRAL MALAR CHEEK
LOCATION DETAILED: LEFT MENTAL CREASE

## 2019-04-02 NOTE — PROCEDURE: MICRODERMABRASION
Endpoint: mild erythema
Indication: acne
Detail Level: Zone
Post-Care Instructions: I reviewed with the patient in detail post-care instructions. Patient should stay away from the sun and wear sun protection until treated areas are fully healed.
Treatment Number: 0
Wand: Medium
Prep Text: The patient's skin was cleaned and prepped with steam and Lactifoam cleanser. After vacuum was performed weekly HA pads/ daily toner / moisture therapy and spf 45 were applied.
Vacuum Pressure: [address]
Consent: Written consent obtained, risks reviewed including but not limited to crusting, scabbing, blistering, scarring, darker or lighter pigmentary change, bruising, and/or incomplete response.
Number Of Passes: 2

## 2019-04-02 NOTE — PROCEDURE: ACNE SURGERY
Render Number Of Lesions Treated: no
Hemostasis: Pressure
Render Post-Care Instructions In Note?: yes
Post-Care Instructions: I reviewed with the patient in detail post-care instructions. Patient is to keep the treatment areas dry overnight, and then apply bacitracin twice daily until healed. Patient may apply hydrogen peroxide soaks to remove any crusting.
Consent was obtained and risks were reviewed including but not limited to scarring, infection, bleeding, scabbing, incomplete removal, and allergy to anesthesia.
Extraction Method: 18 gauge needle, cotton-tipped applicators and gentle lateral pressure
Prep Text (Optional): Prior to extraction skin of the face was cleansed under steam using lactifoam cleanser
Detail Level: Zone
Acne Type: Comedonal Lesions

## 2019-04-11 ENCOUNTER — RX ONLY (OUTPATIENT)
Age: 24
Setting detail: RX ONLY
End: 2019-04-11

## 2019-04-11 RX ORDER — MINOCYCLINE HYDROCHLORIDE 100 MG/1
CAPSULE ORAL
Qty: 30 | Refills: 2 | Status: ERX | COMMUNITY
Start: 2019-04-11

## 2019-04-16 ENCOUNTER — APPOINTMENT (RX ONLY)
Dept: URBAN - METROPOLITAN AREA CLINIC 54 | Facility: CLINIC | Age: 24
Setting detail: DERMATOLOGY
End: 2019-04-16

## 2019-04-16 DIAGNOSIS — Z41.9 ENCOUNTER FOR PROCEDURE FOR PURPOSES OTHER THAN REMEDYING HEALTH STATE, UNSPECIFIED: ICD-10-CM

## 2019-04-16 DIAGNOSIS — L70.0 ACNE VULGARIS: ICD-10-CM

## 2019-04-16 PROCEDURE — ? TREATMENT REGIMEN

## 2019-04-16 PROCEDURE — ? CHEMICAL PEEL JESSNER/TCA

## 2019-04-16 PROCEDURE — ? COUNSELING

## 2019-04-16 PROCEDURE — ? OTHER

## 2019-04-16 PROCEDURE — 99213 OFFICE O/P EST LOW 20 MIN: CPT

## 2019-04-16 ASSESSMENT — LOCATION DETAILED DESCRIPTION DERM
LOCATION DETAILED: LEFT CHIN
LOCATION DETAILED: RIGHT INFERIOR CENTRAL MALAR CHEEK
LOCATION DETAILED: LEFT SUPERIOR MEDIAL FOREHEAD
LOCATION DETAILED: LEFT CENTRAL MALAR CHEEK

## 2019-04-16 ASSESSMENT — LOCATION SIMPLE DESCRIPTION DERM
LOCATION SIMPLE: CHIN
LOCATION SIMPLE: LEFT CHEEK
LOCATION SIMPLE: RIGHT CHEEK
LOCATION SIMPLE: LEFT FOREHEAD

## 2019-04-16 ASSESSMENT — LOCATION ZONE DERM: LOCATION ZONE: FACE

## 2019-04-16 NOTE — PROCEDURE: OTHER
Detail Level: Zone
Other (Free Text): Chemical peel done by [facility].
Note Text (......Xxx Chief Complaint.): This diagnosis correlates with the

## 2019-04-16 NOTE — PROCEDURE: COUNSELING
Tazorac Counseling:  Patient advised that medication is irritating and drying. Patient may need to apply sparingly and wash off after an hour before eventually leaving it on overnight. The patient verbalized understanding of the proper use and possible adverse effects of tazorac. All of the patient's questions and concerns were addressed.
Bactrim Counseling:  I discussed with the patient the risks of sulfa antibiotics including but not limited to GI upset, allergic reaction, drug rash, diarrhea, dizziness, photosensitivity, and yeast infections. Rarely, more serious reactions can occur including but not limited to aplastic anemia, agranulocytosis, methemoglobinemia, blood dyscrasias, liver or kidney failure, lung infiltrates or desquamative/blistering drug rashes.
Spironolactone Counseling: Patient advised regarding risks of diarrhea, abdominal pain, hyperkalemia, birth defects (for female patients), liver toxicity and renal toxicity. The patient may need blood work to monitor liver and kidney function and potassium levels while on therapy. The patient verbalized understanding of the proper use and possible adverse effects of spironolactone. All of the patient's questions and concerns were addressed.
Benzoyl Peroxide Counseling: Patient counseled that medicine may cause skin irritation and bleach clothing. In the event of skin irritation, the patient was advised to reduce the amount of the drug applied or use it less frequently. The patient verbalized understanding of the proper use and possible adverse effects of benzoyl peroxide. All of the patient's questions and concerns were addressed.
Topical Sulfur Applications Counseling: Topical Sulfur Counseling: Patient counseled that this medication may cause skin irritation or allergic reactions. In the event of skin irritation, the patient was advised to reduce the amount of the drug applied or use it less frequently. The patient verbalized understanding of the proper use and possible adverse effects of topical sulfur application. All of the patient's questions and concerns were addressed.
High Dose Vitamin A Counseling: Side effects reviewed, pt to contact office should one occur.
Dapsone Counseling: I discussed with the patient the risks of dapsone including but not limited to hemolytic anemia, agranulocytosis, rashes, methemoglobinemia, kidney failure, peripheral neuropathy, headaches, GI upset, and liver toxicity. Patients who start dapsone require monitoring including baseline LFTs and weekly CBCs for the first month, then every month thereafter. The patient verbalized understanding of the proper use and possible adverse effects of dapsone. All of the patient's questions and concerns were addressed.
Erythromycin Counseling:  I discussed with the patient the risks of erythromycin including but not limited to GI upset, allergic reaction, drug rash, diarrhea, increase in liver enzymes, and yeast infections.
Spironolactone Pregnancy And Lactation Text: This medication can cause feminization of the male fetus and should be avoided during pregnancy. The active metabolite is also found in breast milk.
Bactrim Pregnancy And Lactation Text: This medication is Pregnancy Category D and is known to cause fetal risk. It is also excreted in breast milk.
Tazorac Pregnancy And Lactation Text: This medication is not safe during pregnancy. It is unknown if this medication is excreted in breast milk.
Erythromycin Pregnancy And Lactation Text: This medication is Pregnancy Category B and is considered safe during pregnancy. It is also excreted in breast milk.
Dapsone Pregnancy And Lactation Text: This medication is Pregnancy Category C and is not considered safe during pregnancy or breast feeding.
Benzoyl Peroxide Pregnancy And Lactation Text: This medication is Pregnancy Category C. It is unknown if benzoyl peroxide is excreted in breast milk.
Topical Sulfur Applications Pregnancy And Lactation Text: This medication is Pregnancy Category C and has an unknown safety profile during pregnancy. It is unknown if this topical medication is excreted in breast milk.
High Dose Vitamin A Pregnancy And Lactation Text: High dose vitamin A therapy is contraindicated during pregnancy and breast feeding.
Topical Clindamycin Counseling: Patient counseled that this medication may cause skin irritation or allergic reactions. In the event of skin irritation, the patient was advised to reduce the amount of the drug applied or use it less frequently. The patient verbalized understanding of the proper use and possible adverse effects of clindamycin. All of the patient's questions and concerns were addressed.
Birth Control Pills Counseling: Birth Control Pill Counseling: I discussed with the patient the potential side effects of OCPs including but not limited to increased risk of stroke, heart attack, thrombophlebitis, deep venous thrombosis, hepatic adenomas, breast changes, GI upset, headaches, and depression. The patient verbalized understanding of the proper use and possible adverse effects of OCPs. All of the patient's questions and concerns were addressed.
Tetracycline Counseling: Patient counseled regarding possible photosensitivity and increased risk for sunburn. Patient instructed to avoid sunlight, if possible. When exposed to sunlight, patients should wear protective clothing, sunglasses, and sunscreen. The patient was instructed to call the office immediately if the following severe adverse effects occur:  hearing changes, easy bruising/bleeding, severe headache, or vision changes. The patient verbalized understanding of the proper use and possible adverse effects of tetracycline. All of the patient's questions and concerns were addressed. Patient understands to avoid pregnancy while on therapy due to potential birth defects.
Isotretinoin Counseling: Patient should get monthly blood tests, not donate blood, not drive at night if vision affected, not share medication, and not undergo elective surgery for 6 months after tx completed. Side effects reviewed, pt to contact office should one occur.
Minocycline Counseling: Patient advised regarding possible photosensitivity and discoloration of the teeth, skin, lips, tongue and gums. Patient instructed to avoid sunlight, if possible. When exposed to sunlight, patients should wear protective clothing, sunglasses, and sunscreen. The patient was instructed to call the office immediately if the following severe adverse effects occur:  hearing changes, easy bruising/bleeding, severe headache, or vision changes. The patient verbalized understanding of the proper use and possible adverse effects of minocycline. All of the patient's questions and concerns were addressed.
Azithromycin Counseling:  I discussed with the patient the risks of azithromycin including but not limited to GI upset, allergic reaction, drug rash, diarrhea, and yeast infections.
Topical Retinoid counseling:  Patient advised to apply a pea-sized amount only at bedtime and wait 30 minutes after washing their face before applying. If too drying, patient may add a non-comedogenic moisturizer. The patient verbalized understanding of the proper use and possible adverse effects of retinoids. All of the patient's questions and concerns were addressed.
Doxycycline Counseling:  Patient counseled regarding possible photosensitivity and increased risk for sunburn. Patient instructed to avoid sunlight, if possible. When exposed to sunlight, patients should wear protective clothing, sunglasses, and sunscreen. The patient was instructed to call the office immediately if the following severe adverse effects occur:  hearing changes, easy bruising/bleeding, severe headache, or vision changes. The patient verbalized understanding of the proper use and possible adverse effects of doxycycline. All of the patient's questions and concerns were addressed.
Use Enhanced Medication Counseling?: No
Azithromycin Pregnancy And Lactation Text: This medication is considered safe during pregnancy and is also secreted in breast milk.
Doxycycline Pregnancy And Lactation Text: This medication is Pregnancy Category D and not consider safe during pregnancy. It is also excreted in breast milk but is considered safe for shorter treatment courses.
Isotretinoin Pregnancy And Lactation Text: This medication is Pregnancy Category X and is considered extremely dangerous during pregnancy. It is unknown if it is excreted in breast milk.
Topical Clindamycin Pregnancy And Lactation Text: This medication is Pregnancy Category B and is considered safe during pregnancy. It is unknown if it is excreted in breast milk.
Birth Control Pills Pregnancy And Lactation Text: This medication should be avoided if pregnant and for the first 30 days post-partum.
Tetracycline Pregnancy And Lactation Text: This medication is Pregnancy Category D and not consider safe during pregnancy. It is also excreted in breast milk.
Topical Retinoid Pregnancy And Lactation Text: This medication is Pregnancy Category C. It is unknown if this medication is excreted in breast milk.
Detail Level: Detailed

## 2019-04-16 NOTE — PROCEDURE: CHEMICAL PEEL JESSNER/TCA
Detail Level: Detailed
Time (Mins): 2
Treatment Number: 0
Chemical Peel: 17.5% TCA
Prep: Patientâs skin was cleaned thoroughly before applying the peel
Post Peel Care: After the procedure, the treatment area was washed with soap and water, and a post-peel cream was applied. Sun protection and post-care instructions were reviewed with the patient.
Erythema: mild
Post-Care Instructions: I reviewed with the patient in detail post-care instructions. Patient should avoid sun exposure and wear sun protection.
Consent: Prior to the procedure, written consent was obtained and risks were reviewed, including but not limited to: redness, peeling, blistering, pigmentary change, scarring, infection, and pain.
Frost (0,1+,2+,3+,4+): 1+

## 2019-04-16 NOTE — PROCEDURE: MIPS QUALITY
Quality 130: Documentation Of Current Medications In The Medical Record: Current Medications Documented
Quality 431: Preventive Care And Screening: Unhealthy Alcohol Use - Screening: Patient screened for unhealthy alcohol use using a single question and scores less than 2 times per year
Detail Level: Detailed
Quality 226: Preventive Care And Screening: Tobacco Use: Screening And Cessation Intervention: Tobacco Screening not Performed for Unknown Reasons

## 2019-04-30 ENCOUNTER — APPOINTMENT (RX ONLY)
Dept: URBAN - METROPOLITAN AREA CLINIC 54 | Facility: CLINIC | Age: 24
Setting detail: DERMATOLOGY
End: 2019-04-30

## 2019-04-30 DIAGNOSIS — L70.0 ACNE VULGARIS: ICD-10-CM

## 2019-04-30 PROCEDURE — ? MICRODERMABRASION

## 2019-04-30 PROCEDURE — ? ACNE SURGERY

## 2019-04-30 PROCEDURE — 10040 EXTRACTION: CPT

## 2019-04-30 ASSESSMENT — LOCATION DETAILED DESCRIPTION DERM
LOCATION DETAILED: SUPERIOR MID FOREHEAD
LOCATION DETAILED: RIGHT INFERIOR CENTRAL MALAR CHEEK
LOCATION DETAILED: LEFT SUPERIOR FOREHEAD
LOCATION DETAILED: LEFT CENTRAL MALAR CHEEK
LOCATION DETAILED: RIGHT CENTRAL MALAR CHEEK
LOCATION DETAILED: RIGHT LOWER CUTANEOUS LIP
LOCATION DETAILED: RIGHT MEDIAL FOREHEAD

## 2019-04-30 ASSESSMENT — LOCATION ZONE DERM
LOCATION ZONE: FACE
LOCATION ZONE: LIP

## 2019-04-30 ASSESSMENT — LOCATION SIMPLE DESCRIPTION DERM
LOCATION SIMPLE: SUPERIOR FOREHEAD
LOCATION SIMPLE: LEFT FOREHEAD
LOCATION SIMPLE: RIGHT CHEEK
LOCATION SIMPLE: LEFT CHEEK
LOCATION SIMPLE: RIGHT FOREHEAD
LOCATION SIMPLE: RIGHT LIP

## 2019-04-30 NOTE — PROCEDURE: MICRODERMABRASION
Consent: Written consent obtained, risks reviewed including but not limited to crusting, scabbing, blistering, scarring, darker or lighter pigmentary change, bruising, and/or incomplete response.
Detail Level: Zone
Prep Text: The patient's skin was cleaned and prepped with steam and Lactifoam cleanser. After vacuum was performed weekly HA pads/ daily toner / moisture therapy and spf 45 were applied.
Treatment Number: 1
Wand: Medium
Vacuum Pressure: [address]
Indication: acne
Post-Care Instructions: I reviewed with the patient in detail post-care instructions. Patient should stay away from the sun and wear sun protection until treated areas are fully healed.
Number Of Passes: 3
Endpoint: mild erythema
Vacuum Pressure Units: inches Hg

## 2019-04-30 NOTE — PROCEDURE: ACNE SURGERY
Acne Type: Comedonal Lesions
Consent was obtained and risks were reviewed including but not limited to scarring, infection, bleeding, scabbing, incomplete removal, and allergy to anesthesia.
Render Post-Care Instructions In Note?: yes
Hemostasis: Pressure
Detail Level: Zone
Prep Text (Optional): Prior to extraction skin of the face was cleansed under steam using lactifoam cleanser
Extraction Method: 18 gauge needle, cotton-tipped applicators and gentle lateral pressure
Post-Care Instructions: I reviewed with the patient in detail post-care instructions. Patient is to keep the treatment areas dry overnight, and then apply bacitracin twice daily until healed. Patient may apply hydrogen peroxide soaks to remove any crusting.
Render Number Of Lesions Treated: no

## 2019-05-14 ENCOUNTER — APPOINTMENT (RX ONLY)
Dept: URBAN - METROPOLITAN AREA CLINIC 54 | Facility: CLINIC | Age: 24
Setting detail: DERMATOLOGY
End: 2019-05-14

## 2019-05-14 DIAGNOSIS — L66.8 OTHER CICATRICIAL ALOPECIA: ICD-10-CM

## 2019-05-14 DIAGNOSIS — L70.0 ACNE VULGARIS: ICD-10-CM

## 2019-05-14 DIAGNOSIS — Z41.9 ENCOUNTER FOR PROCEDURE FOR PURPOSES OTHER THAN REMEDYING HEALTH STATE, UNSPECIFIED: ICD-10-CM

## 2019-05-14 DIAGNOSIS — L66.89 OTHER CICATRICIAL ALOPECIA: ICD-10-CM

## 2019-05-14 PROCEDURE — 99213 OFFICE O/P EST LOW 20 MIN: CPT | Mod: 25

## 2019-05-14 PROCEDURE — ? CHEMICAL PEEL JESSNER/TCA

## 2019-05-14 PROCEDURE — 11900 INJECT SKIN LESIONS </W 7: CPT

## 2019-05-14 PROCEDURE — ? COUNSELING

## 2019-05-14 PROCEDURE — ? INTRALESIONAL KENALOG

## 2019-05-14 PROCEDURE — ? TREATMENT REGIMEN

## 2019-05-14 ASSESSMENT — LOCATION DETAILED DESCRIPTION DERM
LOCATION DETAILED: INFERIOR MID FOREHEAD
LOCATION DETAILED: LEFT CENTRAL MALAR CHEEK
LOCATION DETAILED: RIGHT CENTRAL MALAR CHEEK
LOCATION DETAILED: MID-FRONTAL SCALP
LOCATION DETAILED: LEFT CENTRAL MALAR CHEEK
LOCATION DETAILED: LEFT CHIN
LOCATION DETAILED: RIGHT CENTRAL MALAR CHEEK
LOCATION DETAILED: LEFT CHIN
LOCATION DETAILED: MID-FRONTAL SCALP
LOCATION DETAILED: INFERIOR MID FOREHEAD

## 2019-05-14 ASSESSMENT — LOCATION ZONE DERM
LOCATION ZONE: SCALP
LOCATION ZONE: FACE
LOCATION ZONE: SCALP
LOCATION ZONE: FACE

## 2019-05-14 ASSESSMENT — LOCATION SIMPLE DESCRIPTION DERM
LOCATION SIMPLE: RIGHT CHEEK
LOCATION SIMPLE: INFERIOR FOREHEAD
LOCATION SIMPLE: LEFT CHEEK
LOCATION SIMPLE: ANTERIOR SCALP
LOCATION SIMPLE: INFERIOR FOREHEAD
LOCATION SIMPLE: ANTERIOR SCALP
LOCATION SIMPLE: CHIN
LOCATION SIMPLE: CHIN
LOCATION SIMPLE: LEFT CHEEK
LOCATION SIMPLE: RIGHT CHEEK

## 2019-05-14 NOTE — PROCEDURE: MIPS QUALITY
Quality 131: Pain Assessment And Follow-Up: No documentation of pain assessment, reason not given
Quality 226: Preventive Care And Screening: Tobacco Use: Screening And Cessation Intervention: Patient screened for tobacco use and is an ex/non-smoker
Quality 130: Documentation Of Current Medications In The Medical Record: Current Medications Documented
Quality 110: Preventive Care And Screening: Influenza Immunization: Influenza immunization was not ordered or administered, reason not given
Quality 431: Preventive Care And Screening: Unhealthy Alcohol Use - Screening: Patient screened for unhealthy alcohol use using a single question and scores less than 2 times per year
Detail Level: Detailed

## 2019-05-14 NOTE — PROCEDURE: CHEMICAL PEEL JESSNER/TCA
Prep: Patientâs skin was cleaned thoroughly before applying the peel
Post Peel Care: After the procedure, the treatment area was washed with soap and water, and a post-peel cream was applied. Sun protection and post-care instructions were reviewed with the patient.
Detail Level: Detailed
Time (Mins): 2
Consent: Prior to the procedure, written consent was obtained and risks were reviewed, including but not limited to: redness, peeling, blistering, pigmentary change, scarring, infection, and pain.
Post-Care Instructions: I reviewed with the patient in detail post-care instructions. Patient should avoid sun exposure and wear sun protection.
Price (Use Numbers Only, No Special Characters Or $): 0
Erythema: moderate
Number Of Coats: 3
Frost (0,1+,2+,3+,4+): 1+
Chemical Peel: 20% TCA

## 2019-05-14 NOTE — PROCEDURE: COUNSELING
Isotretinoin Pregnancy And Lactation Text: This medication is Pregnancy Category X and is considered extremely dangerous during pregnancy. It is unknown if it is excreted in breast milk.
Use Enhanced Medication Counseling?: No
Doxycycline Pregnancy And Lactation Text: This medication is Pregnancy Category D and not consider safe during pregnancy. It is also excreted in breast milk but is considered safe for shorter treatment courses.
Minocycline Pregnancy And Lactation Text: This medication is Pregnancy Category D and not consider safe during pregnancy. It is also excreted in breast milk.
Topical Clindamycin Pregnancy And Lactation Text: This medication is Pregnancy Category B and is considered safe during pregnancy. It is unknown if it is excreted in breast milk.
Topical Retinoid Pregnancy And Lactation Text: This medication is Pregnancy Category C. It is unknown if this medication is excreted in breast milk.
Bactrim Counseling:  I discussed with the patient the risks of sulfa antibiotics including but not limited to GI upset, allergic reaction, drug rash, diarrhea, dizziness, photosensitivity, and yeast infections. Rarely, more serious reactions can occur including but not limited to aplastic anemia, agranulocytosis, methemoglobinemia, blood dyscrasias, liver or kidney failure, lung infiltrates or desquamative/blistering drug rashes.
Tazorac Counseling:  Patient advised that medication is irritating and drying. Patient may need to apply sparingly and wash off after an hour before eventually leaving it on overnight. The patient verbalized understanding of the proper use and possible adverse effects of tazorac. All of the patient's questions and concerns were addressed.
Erythromycin Counseling:  I discussed with the patient the risks of erythromycin including but not limited to GI upset, allergic reaction, drug rash, diarrhea, increase in liver enzymes, and yeast infections.
Dapsone Counseling: I discussed with the patient the risks of dapsone including but not limited to hemolytic anemia, agranulocytosis, rashes, methemoglobinemia, kidney failure, peripheral neuropathy, headaches, GI upset, and liver toxicity. Patients who start dapsone require monitoring including baseline LFTs and weekly CBCs for the first month, then every month thereafter. The patient verbalized understanding of the proper use and possible adverse effects of dapsone. All of the patient's questions and concerns were addressed.
High Dose Vitamin A Counseling: Side effects reviewed, pt to contact office should one occur.
Spironolactone Counseling: Patient advised regarding risks of diarrhea, abdominal pain, hyperkalemia, birth defects (for female patients), liver toxicity and renal toxicity. The patient may need blood work to monitor liver and kidney function and potassium levels while on therapy. The patient verbalized understanding of the proper use and possible adverse effects of spironolactone. All of the patient's questions and concerns were addressed.
Topical Sulfur Applications Counseling: Topical Sulfur Counseling: Patient counseled that this medication may cause skin irritation or allergic reactions. In the event of skin irritation, the patient was advised to reduce the amount of the drug applied or use it less frequently. The patient verbalized understanding of the proper use and possible adverse effects of topical sulfur application. All of the patient's questions and concerns were addressed.
Benzoyl Peroxide Counseling: Patient counseled that medicine may cause skin irritation and bleach clothing. In the event of skin irritation, the patient was advised to reduce the amount of the drug applied or use it less frequently. The patient verbalized understanding of the proper use and possible adverse effects of benzoyl peroxide. All of the patient's questions and concerns were addressed.
Dapsone Pregnancy And Lactation Text: This medication is Pregnancy Category C and is not considered safe during pregnancy or breast feeding.
Detail Level: Detailed
Bactrim Pregnancy And Lactation Text: This medication is Pregnancy Category D and is known to cause fetal risk. It is also excreted in breast milk.
Topical Sulfur Applications Pregnancy And Lactation Text: This medication is Pregnancy Category C and has an unknown safety profile during pregnancy. It is unknown if this topical medication is excreted in breast milk.
Erythromycin Pregnancy And Lactation Text: This medication is Pregnancy Category B and is considered safe during pregnancy. It is also excreted in breast milk.
High Dose Vitamin A Pregnancy And Lactation Text: High dose vitamin A therapy is contraindicated during pregnancy and breast feeding.
Benzoyl Peroxide Pregnancy And Lactation Text: This medication is Pregnancy Category C. It is unknown if benzoyl peroxide is excreted in breast milk.
Tazorac Pregnancy And Lactation Text: This medication is not safe during pregnancy. It is unknown if this medication is excreted in breast milk.
Spironolactone Pregnancy And Lactation Text: This medication can cause feminization of the male fetus and should be avoided during pregnancy. The active metabolite is also found in breast milk.
Birth Control Pills Counseling: Birth Control Pill Counseling: I discussed with the patient the potential side effects of OCPs including but not limited to increased risk of stroke, heart attack, thrombophlebitis, deep venous thrombosis, hepatic adenomas, breast changes, GI upset, headaches, and depression. The patient verbalized understanding of the proper use and possible adverse effects of OCPs. All of the patient's questions and concerns were addressed.
Topical Retinoid counseling:  Patient advised to apply a pea-sized amount only at bedtime and wait 30 minutes after washing their face before applying. If too drying, patient may add a non-comedogenic moisturizer. The patient verbalized understanding of the proper use and possible adverse effects of retinoids. All of the patient's questions and concerns were addressed.
Azithromycin Counseling:  I discussed with the patient the risks of azithromycin including but not limited to GI upset, allergic reaction, drug rash, diarrhea, and yeast infections.
Doxycycline Counseling:  Patient counseled regarding possible photosensitivity and increased risk for sunburn. Patient instructed to avoid sunlight, if possible. When exposed to sunlight, patients should wear protective clothing, sunglasses, and sunscreen. The patient was instructed to call the office immediately if the following severe adverse effects occur:  hearing changes, easy bruising/bleeding, severe headache, or vision changes. The patient verbalized understanding of the proper use and possible adverse effects of doxycycline. All of the patient's questions and concerns were addressed.
Isotretinoin Counseling: Patient should get monthly blood tests, not donate blood, not drive at night if vision affected, not share medication, and not undergo elective surgery for 6 months after tx completed. Side effects reviewed, pt to contact office should one occur.
Tetracycline Counseling: Patient counseled regarding possible photosensitivity and increased risk for sunburn. Patient instructed to avoid sunlight, if possible. When exposed to sunlight, patients should wear protective clothing, sunglasses, and sunscreen. The patient was instructed to call the office immediately if the following severe adverse effects occur:  hearing changes, easy bruising/bleeding, severe headache, or vision changes. The patient verbalized understanding of the proper use and possible adverse effects of tetracycline. All of the patient's questions and concerns were addressed. Patient understands to avoid pregnancy while on therapy due to potential birth defects.
Topical Clindamycin Counseling: Patient counseled that this medication may cause skin irritation or allergic reactions. In the event of skin irritation, the patient was advised to reduce the amount of the drug applied or use it less frequently. The patient verbalized understanding of the proper use and possible adverse effects of clindamycin. All of the patient's questions and concerns were addressed.
Minocycline Counseling: Patient advised regarding possible photosensitivity and discoloration of the teeth, skin, lips, tongue and gums. Patient instructed to avoid sunlight, if possible. When exposed to sunlight, patients should wear protective clothing, sunglasses, and sunscreen. The patient was instructed to call the office immediately if the following severe adverse effects occur:  hearing changes, easy bruising/bleeding, severe headache, or vision changes. The patient verbalized understanding of the proper use and possible adverse effects of minocycline. All of the patient's questions and concerns were addressed.
Azithromycin Pregnancy And Lactation Text: This medication is considered safe during pregnancy and is also secreted in breast milk.
Birth Control Pills Pregnancy And Lactation Text: This medication should be avoided if pregnant and for the first 30 days post-partum.

## 2019-05-14 NOTE — PROCEDURE: INTRALESIONAL KENALOG
Kenalog Preparation: Kenalog
Ndc# For Kenalog Only: [address]-3
Include Z78.9 (Other Specified Conditions Influencing Health Status) As An Associated Diagnosis?: No
Concentration Of Solution Injected (Mg/Ml): 10.0
Medical Necessity Clause: This procedure was medically necessary because the lesions that were treated were:
Treatment Number (Optional): 1
Detail Level: Detailed
Total Volume Injected (Ccs- Only Use Numbers And Decimals): 0.3
Administered By (Optional): Nataliia Morris
Consent: The risks of atrophy were reviewed with the patient.
X Size Of Lesion In Cm (Optional): 0

## 2019-06-18 ENCOUNTER — APPOINTMENT (RX ONLY)
Dept: URBAN - METROPOLITAN AREA CLINIC 54 | Facility: CLINIC | Age: 24
Setting detail: DERMATOLOGY
End: 2019-06-18

## 2019-06-18 DIAGNOSIS — L66.89 OTHER CICATRICIAL ALOPECIA: ICD-10-CM | Status: RESOLVING

## 2019-06-18 DIAGNOSIS — L66.8 OTHER CICATRICIAL ALOPECIA: ICD-10-CM | Status: RESOLVING

## 2019-06-18 DIAGNOSIS — Z41.9 ENCOUNTER FOR PROCEDURE FOR PURPOSES OTHER THAN REMEDYING HEALTH STATE, UNSPECIFIED: ICD-10-CM

## 2019-06-18 DIAGNOSIS — L70.0 ACNE VULGARIS: ICD-10-CM

## 2019-06-18 PROCEDURE — ? COUNSELING

## 2019-06-18 PROCEDURE — 99213 OFFICE O/P EST LOW 20 MIN: CPT

## 2019-06-18 PROCEDURE — ? PRESCRIPTION

## 2019-06-18 PROCEDURE — ? CHEMICAL PEEL JESSNER/TCA

## 2019-06-18 PROCEDURE — ? OTHER

## 2019-06-18 PROCEDURE — ? TREATMENT REGIMEN

## 2019-06-18 RX ORDER — SARECYCLINE HYDROCHLORIDE 100 MG/1
TABLET, COATED ORAL QD
Qty: 30 | Refills: 0 | Status: ERX | COMMUNITY
Start: 2019-06-18

## 2019-06-18 RX ADMIN — SARECYCLINE HYDROCHLORIDE: 100 TABLET, COATED ORAL at 23:06

## 2019-06-18 ASSESSMENT — LOCATION SIMPLE DESCRIPTION DERM
LOCATION SIMPLE: RIGHT LIP
LOCATION SIMPLE: RIGHT CHEEK
LOCATION SIMPLE: RIGHT FOREHEAD
LOCATION SIMPLE: LEFT CHEEK
LOCATION SIMPLE: LEFT FOREHEAD

## 2019-06-18 ASSESSMENT — LOCATION DETAILED DESCRIPTION DERM
LOCATION DETAILED: LEFT MEDIAL FOREHEAD
LOCATION DETAILED: LEFT INFERIOR CENTRAL MALAR CHEEK
LOCATION DETAILED: LEFT CENTRAL MALAR CHEEK
LOCATION DETAILED: RIGHT INFERIOR CENTRAL MALAR CHEEK
LOCATION DETAILED: RIGHT MEDIAL FOREHEAD
LOCATION DETAILED: RIGHT LOWER CUTANEOUS LIP
LOCATION DETAILED: RIGHT CENTRAL MALAR CHEEK

## 2019-06-18 ASSESSMENT — LOCATION ZONE DERM
LOCATION ZONE: FACE
LOCATION ZONE: LIP

## 2019-06-18 NOTE — PROCEDURE: CHEMICAL PEEL JESSNER/TCA
Number Of Coats: 2
Price (Use Numbers Only, No Special Characters Or $): 0
Prep: Patientâs skin was cleaned thoroughly before applying the peel
Detail Level: Detailed
Post-Care Instructions: I reviewed with the patient in detail post-care instructions. Patient should avoid sun exposure and wear sun protection.
Chemical Peel: 20% TCA
Time (Mins): 4
Post Peel Care: After the procedure, the treatment area was washed with soap and water, and a post-peel cream was applied. Sun protection and post-care instructions were reviewed with the patient.
Consent: Prior to the procedure, written consent was obtained and risks were reviewed, including but not limited to: redness, peeling, blistering, pigmentary change, scarring, infection, and pain.

## 2019-06-18 NOTE — PROCEDURE: COUNSELING
Dapsone Counseling: I discussed with the patient the risks of dapsone including but not limited to hemolytic anemia, agranulocytosis, rashes, methemoglobinemia, kidney failure, peripheral neuropathy, headaches, GI upset, and liver toxicity. Patients who start dapsone require monitoring including baseline LFTs and weekly CBCs for the first month, then every month thereafter. The patient verbalized understanding of the proper use and possible adverse effects of dapsone. All of the patient's questions and concerns were addressed.
High Dose Vitamin A Counseling: Side effects reviewed, pt to contact office should one occur.
Topical Sulfur Applications Counseling: Topical Sulfur Counseling: Patient counseled that this medication may cause skin irritation or allergic reactions. In the event of skin irritation, the patient was advised to reduce the amount of the drug applied or use it less frequently. The patient verbalized understanding of the proper use and possible adverse effects of topical sulfur application. All of the patient's questions and concerns were addressed.
Bactrim Counseling:  I discussed with the patient the risks of sulfa antibiotics including but not limited to GI upset, allergic reaction, drug rash, diarrhea, dizziness, photosensitivity, and yeast infections. Rarely, more serious reactions can occur including but not limited to aplastic anemia, agranulocytosis, methemoglobinemia, blood dyscrasias, liver or kidney failure, lung infiltrates or desquamative/blistering drug rashes.
Spironolactone Counseling: Patient advised regarding risks of diarrhea, abdominal pain, hyperkalemia, birth defects (for female patients), liver toxicity and renal toxicity. The patient may need blood work to monitor liver and kidney function and potassium levels while on therapy. The patient verbalized understanding of the proper use and possible adverse effects of spironolactone. All of the patient's questions and concerns were addressed.
Tazorac Counseling:  Patient advised that medication is irritating and drying. Patient may need to apply sparingly and wash off after an hour before eventually leaving it on overnight. The patient verbalized understanding of the proper use and possible adverse effects of tazorac. All of the patient's questions and concerns were addressed.
Benzoyl Peroxide Counseling: Patient counseled that medicine may cause skin irritation and bleach clothing. In the event of skin irritation, the patient was advised to reduce the amount of the drug applied or use it less frequently. The patient verbalized understanding of the proper use and possible adverse effects of benzoyl peroxide. All of the patient's questions and concerns were addressed.
Tazorac Pregnancy And Lactation Text: This medication is not safe during pregnancy. It is unknown if this medication is excreted in breast milk.
Detail Level: Detailed
Erythromycin Counseling:  I discussed with the patient the risks of erythromycin including but not limited to GI upset, allergic reaction, drug rash, diarrhea, increase in liver enzymes, and yeast infections.
Erythromycin Pregnancy And Lactation Text: This medication is Pregnancy Category B and is considered safe during pregnancy. It is also excreted in breast milk.
High Dose Vitamin A Pregnancy And Lactation Text: High dose vitamin A therapy is contraindicated during pregnancy and breast feeding.
Benzoyl Peroxide Pregnancy And Lactation Text: This medication is Pregnancy Category C. It is unknown if benzoyl peroxide is excreted in breast milk.
Dapsone Pregnancy And Lactation Text: This medication is Pregnancy Category C and is not considered safe during pregnancy or breast feeding.
Spironolactone Pregnancy And Lactation Text: This medication can cause feminization of the male fetus and should be avoided during pregnancy. The active metabolite is also found in breast milk.
Topical Sulfur Applications Pregnancy And Lactation Text: This medication is Pregnancy Category C and has an unknown safety profile during pregnancy. It is unknown if this topical medication is excreted in breast milk.
Bactrim Pregnancy And Lactation Text: This medication is Pregnancy Category D and is known to cause fetal risk. It is also excreted in breast milk.
Azithromycin Counseling:  I discussed with the patient the risks of azithromycin including but not limited to GI upset, allergic reaction, drug rash, diarrhea, and yeast infections.
Tetracycline Counseling: Patient counseled regarding possible photosensitivity and increased risk for sunburn. Patient instructed to avoid sunlight, if possible. When exposed to sunlight, patients should wear protective clothing, sunglasses, and sunscreen. The patient was instructed to call the office immediately if the following severe adverse effects occur:  hearing changes, easy bruising/bleeding, severe headache, or vision changes. The patient verbalized understanding of the proper use and possible adverse effects of tetracycline. All of the patient's questions and concerns were addressed. Patient understands to avoid pregnancy while on therapy due to potential birth defects.
Birth Control Pills Counseling: Birth Control Pill Counseling: I discussed with the patient the potential side effects of OCPs including but not limited to increased risk of stroke, heart attack, thrombophlebitis, deep venous thrombosis, hepatic adenomas, breast changes, GI upset, headaches, and depression. The patient verbalized understanding of the proper use and possible adverse effects of OCPs. All of the patient's questions and concerns were addressed.
Minocycline Counseling: Patient advised regarding possible photosensitivity and discoloration of the teeth, skin, lips, tongue and gums. Patient instructed to avoid sunlight, if possible. When exposed to sunlight, patients should wear protective clothing, sunglasses, and sunscreen. The patient was instructed to call the office immediately if the following severe adverse effects occur:  hearing changes, easy bruising/bleeding, severe headache, or vision changes. The patient verbalized understanding of the proper use and possible adverse effects of minocycline. All of the patient's questions and concerns were addressed.
Topical Clindamycin Counseling: Patient counseled that this medication may cause skin irritation or allergic reactions. In the event of skin irritation, the patient was advised to reduce the amount of the drug applied or use it less frequently. The patient verbalized understanding of the proper use and possible adverse effects of clindamycin. All of the patient's questions and concerns were addressed.
Isotretinoin Counseling: Patient should get monthly blood tests, not donate blood, not drive at night if vision affected, not share medication, and not undergo elective surgery for 6 months after tx completed. Side effects reviewed, pt to contact office should one occur.
Topical Retinoid counseling:  Patient advised to apply a pea-sized amount only at bedtime and wait 30 minutes after washing their face before applying. If too drying, patient may add a non-comedogenic moisturizer. The patient verbalized understanding of the proper use and possible adverse effects of retinoids. All of the patient's questions and concerns were addressed.
Use Enhanced Medication Counseling?: No
Doxycycline Counseling:  Patient counseled regarding possible photosensitivity and increased risk for sunburn. Patient instructed to avoid sunlight, if possible. When exposed to sunlight, patients should wear protective clothing, sunglasses, and sunscreen. The patient was instructed to call the office immediately if the following severe adverse effects occur:  hearing changes, easy bruising/bleeding, severe headache, or vision changes. The patient verbalized understanding of the proper use and possible adverse effects of doxycycline. All of the patient's questions and concerns were addressed.
Isotretinoin Pregnancy And Lactation Text: This medication is Pregnancy Category X and is considered extremely dangerous during pregnancy. It is unknown if it is excreted in breast milk.
Topical Retinoid Pregnancy And Lactation Text: This medication is Pregnancy Category C. It is unknown if this medication is excreted in breast milk.
Doxycycline Pregnancy And Lactation Text: This medication is Pregnancy Category D and not consider safe during pregnancy. It is also excreted in breast milk but is considered safe for shorter treatment courses.
Minocycline Pregnancy And Lactation Text: This medication is Pregnancy Category D and not consider safe during pregnancy. It is also excreted in breast milk.
Topical Clindamycin Pregnancy And Lactation Text: This medication is Pregnancy Category B and is considered safe during pregnancy. It is unknown if it is excreted in breast milk.
Birth Control Pills Pregnancy And Lactation Text: This medication should be avoided if pregnant and for the first 30 days post-partum.
Azithromycin Pregnancy And Lactation Text: This medication is considered safe during pregnancy and is also secreted in breast milk.

## 2019-06-18 NOTE — PROCEDURE: TREATMENT REGIMEN
Discontinue Regimen: Minocycline 100mg once daily.
Detail Level: Zone
Initiate Treatment: Seysara 100mg once daily.

## 2019-07-26 ENCOUNTER — RX ONLY (OUTPATIENT)
Age: 24
Setting detail: RX ONLY
End: 2019-07-26

## 2019-07-26 ENCOUNTER — APPOINTMENT (RX ONLY)
Dept: URBAN - METROPOLITAN AREA CLINIC 54 | Facility: CLINIC | Age: 24
Setting detail: DERMATOLOGY
End: 2019-07-26

## 2019-07-26 DIAGNOSIS — L70.0 ACNE VULGARIS: ICD-10-CM | Status: RESOLVING

## 2019-07-26 DIAGNOSIS — Z41.9 ENCOUNTER FOR PROCEDURE FOR PURPOSES OTHER THAN REMEDYING HEALTH STATE, UNSPECIFIED: ICD-10-CM

## 2019-07-26 PROCEDURE — ? CHEMICAL PEEL JESSNER/TCA

## 2019-07-26 PROCEDURE — ? OTHER

## 2019-07-26 PROCEDURE — ? TREATMENT REGIMEN

## 2019-07-26 PROCEDURE — ? COUNSELING

## 2019-07-26 PROCEDURE — 99213 OFFICE O/P EST LOW 20 MIN: CPT

## 2019-07-26 RX ORDER — CLINDAMYCIN PHOSPHATE 10 MG/ML
SOLUTION TOPICAL
Qty: 1 | Refills: 2 | Status: ERX

## 2019-07-26 RX ORDER — BENZOYL PEROXIDE 50 MG/ML
LIQUID TOPICAL
Qty: 1 | Refills: 1 | Status: ERX

## 2019-07-26 RX ORDER — SODIUM SULFACETAMIDE 100 MG/ML
LOTION TOPICAL
Qty: 1 | Refills: 2 | Status: ERX

## 2019-07-26 ASSESSMENT — LOCATION SIMPLE DESCRIPTION DERM
LOCATION SIMPLE: LEFT CHEEK
LOCATION SIMPLE: LEFT FOREHEAD
LOCATION SIMPLE: RIGHT CHEEK
LOCATION SIMPLE: RIGHT LIP
LOCATION SIMPLE: RIGHT FOREHEAD

## 2019-07-26 ASSESSMENT — LOCATION DETAILED DESCRIPTION DERM
LOCATION DETAILED: RIGHT INFERIOR CENTRAL MALAR CHEEK
LOCATION DETAILED: RIGHT CENTRAL MALAR CHEEK
LOCATION DETAILED: LEFT MEDIAL FOREHEAD
LOCATION DETAILED: LEFT CENTRAL MALAR CHEEK
LOCATION DETAILED: RIGHT MEDIAL FOREHEAD
LOCATION DETAILED: RIGHT LOWER CUTANEOUS LIP
LOCATION DETAILED: LEFT INFERIOR CENTRAL MALAR CHEEK

## 2019-07-26 ASSESSMENT — LOCATION ZONE DERM
LOCATION ZONE: FACE
LOCATION ZONE: LIP

## 2019-07-26 NOTE — PROCEDURE: TREATMENT REGIMEN
Detail Level: Zone
Initiate Treatment: Seysara 100mg once daily.
Continue Regimen: Benzoyl peroxide 5% apply to face Twice a day. \\nClindamycin 1% Apply to face Twice a day \\nSulfacetamide 10% Apply to face Twice a day

## 2019-07-26 NOTE — PROCEDURE: COUNSELING
Topical Clindamycin Counseling: Patient counseled that this medication may cause skin irritation or allergic reactions. In the event of skin irritation, the patient was advised to reduce the amount of the drug applied or use it less frequently. The patient verbalized understanding of the proper use and possible adverse effects of clindamycin. All of the patient's questions and concerns were addressed.
Azithromycin Counseling:  I discussed with the patient the risks of azithromycin including but not limited to GI upset, allergic reaction, drug rash, diarrhea, and yeast infections.
Topical Retinoid Pregnancy And Lactation Text: This medication is Pregnancy Category C. It is unknown if this medication is excreted in breast milk.
Dapsone Counseling: I discussed with the patient the risks of dapsone including but not limited to hemolytic anemia, agranulocytosis, rashes, methemoglobinemia, kidney failure, peripheral neuropathy, headaches, GI upset, and liver toxicity. Patients who start dapsone require monitoring including baseline LFTs and weekly CBCs for the first month, then every month thereafter. The patient verbalized understanding of the proper use and possible adverse effects of dapsone. All of the patient's questions and concerns were addressed.
Dapsone Pregnancy And Lactation Text: This medication is Pregnancy Category C and is not considered safe during pregnancy or breast feeding.
High Dose Vitamin A Counseling: Side effects reviewed, pt to contact office should one occur.
Erythromycin Pregnancy And Lactation Text: This medication is Pregnancy Category B and is considered safe during pregnancy. It is also excreted in breast milk.
Tetracycline Pregnancy And Lactation Text: This medication is Pregnancy Category D and not consider safe during pregnancy. It is also excreted in breast milk.
Spironolactone Counseling: Patient advised regarding risks of diarrhea, abdominal pain, hyperkalemia, birth defects (for female patients), liver toxicity and renal toxicity. The patient may need blood work to monitor liver and kidney function and potassium levels while on therapy. The patient verbalized understanding of the proper use and possible adverse effects of spironolactone. All of the patient's questions and concerns were addressed.
Bactrim Pregnancy And Lactation Text: This medication is Pregnancy Category D and is known to cause fetal risk. It is also excreted in breast milk.
Doxycycline Pregnancy And Lactation Text: This medication is Pregnancy Category D and not consider safe during pregnancy. It is also excreted in breast milk but is considered safe for shorter treatment courses.
Topical Clindamycin Pregnancy And Lactation Text: This medication is Pregnancy Category B and is considered safe during pregnancy. It is unknown if it is excreted in breast milk.
Tazorac Counseling:  Patient advised that medication is irritating and drying. Patient may need to apply sparingly and wash off after an hour before eventually leaving it on overnight. The patient verbalized understanding of the proper use and possible adverse effects of tazorac. All of the patient's questions and concerns were addressed.
Spironolactone Pregnancy And Lactation Text: This medication can cause feminization of the male fetus and should be avoided during pregnancy. The active metabolite is also found in breast milk.
Detail Level: Detailed
Tetracycline Counseling: Patient counseled regarding possible photosensitivity and increased risk for sunburn. Patient instructed to avoid sunlight, if possible. When exposed to sunlight, patients should wear protective clothing, sunglasses, and sunscreen. The patient was instructed to call the office immediately if the following severe adverse effects occur:  hearing changes, easy bruising/bleeding, severe headache, or vision changes. The patient verbalized understanding of the proper use and possible adverse effects of tetracycline. All of the patient's questions and concerns were addressed. Patient understands to avoid pregnancy while on therapy due to potential birth defects.
Birth Control Pills Pregnancy And Lactation Text: This medication should be avoided if pregnant and for the first 30 days post-partum.
Isotretinoin Counseling: Patient should get monthly blood tests, not donate blood, not drive at night if vision affected, not share medication, and not undergo elective surgery for 6 months after tx completed. Side effects reviewed, pt to contact office should one occur.
Birth Control Pills Counseling: Birth Control Pill Counseling: I discussed with the patient the potential side effects of OCPs including but not limited to increased risk of stroke, heart attack, thrombophlebitis, deep venous thrombosis, hepatic adenomas, breast changes, GI upset, headaches, and depression. The patient verbalized understanding of the proper use and possible adverse effects of OCPs. All of the patient's questions and concerns were addressed.
Topical Sulfur Applications Pregnancy And Lactation Text: This medication is Pregnancy Category C and has an unknown safety profile during pregnancy. It is unknown if this topical medication is excreted in breast milk.
Doxycycline Counseling:  Patient counseled regarding possible photosensitivity and increased risk for sunburn. Patient instructed to avoid sunlight, if possible. When exposed to sunlight, patients should wear protective clothing, sunglasses, and sunscreen. The patient was instructed to call the office immediately if the following severe adverse effects occur:  hearing changes, easy bruising/bleeding, severe headache, or vision changes. The patient verbalized understanding of the proper use and possible adverse effects of doxycycline. All of the patient's questions and concerns were addressed.
Topical Sulfur Applications Counseling: Topical Sulfur Counseling: Patient counseled that this medication may cause skin irritation or allergic reactions. In the event of skin irritation, the patient was advised to reduce the amount of the drug applied or use it less frequently. The patient verbalized understanding of the proper use and possible adverse effects of topical sulfur application. All of the patient's questions and concerns were addressed.
High Dose Vitamin A Pregnancy And Lactation Text: High dose vitamin A therapy is contraindicated during pregnancy and breast feeding.
Benzoyl Peroxide Pregnancy And Lactation Text: This medication is Pregnancy Category C. It is unknown if benzoyl peroxide is excreted in breast milk.
Topical Retinoid counseling:  Patient advised to apply a pea-sized amount only at bedtime and wait 30 minutes after washing their face before applying. If too drying, patient may add a non-comedogenic moisturizer. The patient verbalized understanding of the proper use and possible adverse effects of retinoids. All of the patient's questions and concerns were addressed.
Benzoyl Peroxide Counseling: Patient counseled that medicine may cause skin irritation and bleach clothing. In the event of skin irritation, the patient was advised to reduce the amount of the drug applied or use it less frequently. The patient verbalized understanding of the proper use and possible adverse effects of benzoyl peroxide. All of the patient's questions and concerns were addressed.
Include Pregnancy/Lactation Warning?: No
Bactrim Counseling:  I discussed with the patient the risks of sulfa antibiotics including but not limited to GI upset, allergic reaction, drug rash, diarrhea, dizziness, photosensitivity, and yeast infections. Rarely, more serious reactions can occur including but not limited to aplastic anemia, agranulocytosis, methemoglobinemia, blood dyscrasias, liver or kidney failure, lung infiltrates or desquamative/blistering drug rashes.
Isotretinoin Pregnancy And Lactation Text: This medication is Pregnancy Category X and is considered extremely dangerous during pregnancy. It is unknown if it is excreted in breast milk.
Azithromycin Pregnancy And Lactation Text: This medication is considered safe during pregnancy and is also secreted in breast milk.
Tazorac Pregnancy And Lactation Text: This medication is not safe during pregnancy. It is unknown if this medication is excreted in breast milk.
Erythromycin Counseling:  I discussed with the patient the risks of erythromycin including but not limited to GI upset, allergic reaction, drug rash, diarrhea, increase in liver enzymes, and yeast infections.
Minocycline Counseling: Patient advised regarding possible photosensitivity and discoloration of the teeth, skin, lips, tongue and gums. Patient instructed to avoid sunlight, if possible. When exposed to sunlight, patients should wear protective clothing, sunglasses, and sunscreen. The patient was instructed to call the office immediately if the following severe adverse effects occur:  hearing changes, easy bruising/bleeding, severe headache, or vision changes. The patient verbalized understanding of the proper use and possible adverse effects of minocycline. All of the patient's questions and concerns were addressed.

## 2019-07-26 NOTE — PROCEDURE: CHEMICAL PEEL JESSNER/TCA
Treatment Number: 0
Post-Care Instructions: I reviewed with the patient in detail post-care instructions. Patient should avoid sun exposure and wear sun protection.
Time (Mins): 4
Consent: Prior to the procedure, written consent was obtained and risks were reviewed, including but not limited to: redness, peeling, blistering, pigmentary change, scarring, infection, and pain.
Number Of Coats: 2
Post Peel Care: After the procedure, the treatment area was washed with soap and water, and a post-peel cream was applied. Sun protection and post-care instructions were reviewed with the patient.
Detail Level: Detailed
Chemical Peel: 20% TCA
Prep: Patientâs skin was cleaned thoroughly before applying the peel

## 2019-09-27 ENCOUNTER — APPOINTMENT (RX ONLY)
Dept: URBAN - METROPOLITAN AREA CLINIC 54 | Facility: CLINIC | Age: 24
Setting detail: DERMATOLOGY
End: 2019-09-27

## 2019-09-27 DIAGNOSIS — L66.89 OTHER CICATRICIAL ALOPECIA: ICD-10-CM

## 2019-09-27 DIAGNOSIS — L66.8 OTHER CICATRICIAL ALOPECIA: ICD-10-CM

## 2019-09-27 DIAGNOSIS — L70.0 ACNE VULGARIS: ICD-10-CM

## 2019-09-27 PROCEDURE — 11900 INJECT SKIN LESIONS </W 7: CPT

## 2019-09-27 PROCEDURE — ? TREATMENT REGIMEN

## 2019-09-27 PROCEDURE — ? INTRALESIONAL KENALOG

## 2019-09-27 PROCEDURE — ? COUNSELING

## 2019-09-27 PROCEDURE — 99213 OFFICE O/P EST LOW 20 MIN: CPT | Mod: 25

## 2019-09-27 ASSESSMENT — LOCATION ZONE DERM
LOCATION ZONE: LIP
LOCATION ZONE: SCALP
LOCATION ZONE: FACE

## 2019-09-27 ASSESSMENT — LOCATION SIMPLE DESCRIPTION DERM
LOCATION SIMPLE: POSTERIOR SCALP
LOCATION SIMPLE: LEFT CHEEK
LOCATION SIMPLE: RIGHT CHEEK
LOCATION SIMPLE: RIGHT FOREHEAD
LOCATION SIMPLE: RIGHT LIP

## 2019-09-27 ASSESSMENT — LOCATION DETAILED DESCRIPTION DERM
LOCATION DETAILED: RIGHT MEDIAL FOREHEAD
LOCATION DETAILED: LEFT SUPERIOR OCCIPITAL SCALP
LOCATION DETAILED: RIGHT LOWER CUTANEOUS LIP
LOCATION DETAILED: RIGHT CENTRAL MALAR CHEEK
LOCATION DETAILED: LEFT CENTRAL MALAR CHEEK

## 2019-09-27 NOTE — PROCEDURE: TREATMENT REGIMEN
Detail Level: Zone
Continue Regimen: Seysara 100mg once daily. \\nBenzoyl peroxide 5% apply to face Twice a day\\nClindamycin 1% Apply to face Twice a day\\nSulfacetamide 10% Apply to face Twice a da

## 2019-09-27 NOTE — PROCEDURE: INTRALESIONAL KENALOG
Detail Level: Detailed
X Size Of Lesion In Cm (Optional): 0
Total Volume Injected (Ccs- Only Use Numbers And Decimals): .2
Lot # (Optional): [id number]
Administered By (Optional): Amelia Diaz
Ndc# For Kenalog Only: 3322-2950-23
Include Z78.9 (Other Specified Conditions Influencing Health Status) As An Associated Diagnosis?: No
Concentration Of Solution Injected (Mg/Ml): 10.0
Kenalog Preparation: Kenalog
Expiration Date (Optional): 02/ 2021
Medical Necessity Clause: This procedure was medically necessary because the lesions that were treated were:
Consent: The risks of atrophy were reviewed with the patient.

## 2019-09-27 NOTE — PROCEDURE: MIPS QUALITY
Detail Level: Detailed
Quality 431: Preventive Care And Screening: Unhealthy Alcohol Use - Screening: Patient screened for unhealthy alcohol use using a single question and scores less than 2 times per year
Quality 226: Preventive Care And Screening: Tobacco Use: Screening And Cessation Intervention: Tobacco Screening not Performed for Unknown Reasons
Quality 130: Documentation Of Current Medications In The Medical Record: Current Medications Documented

## 2019-09-27 NOTE — PROCEDURE: COUNSELING
Tetracycline Pregnancy And Lactation Text: This medication is Pregnancy Category D and not consider safe during pregnancy. It is also excreted in breast milk.
Topical Retinoid counseling:  Patient advised to apply a pea-sized amount only at bedtime and wait 30 minutes after washing their face before applying. If too drying, patient may add a non-comedogenic moisturizer. The patient verbalized understanding of the proper use and possible adverse effects of retinoids. All of the patient's questions and concerns were addressed.
Spironolactone Pregnancy And Lactation Text: This medication can cause feminization of the male fetus and should be avoided during pregnancy. The active metabolite is also found in breast milk.
Dapsone Pregnancy And Lactation Text: This medication is Pregnancy Category C and is not considered safe during pregnancy or breast feeding.
Benzoyl Peroxide Counseling: Patient counseled that medicine may cause skin irritation and bleach clothing. In the event of skin irritation, the patient was advised to reduce the amount of the drug applied or use it less frequently. The patient verbalized understanding of the proper use and possible adverse effects of benzoyl peroxide. All of the patient's questions and concerns were addressed.
High Dose Vitamin A Pregnancy And Lactation Text: High dose vitamin A therapy is contraindicated during pregnancy and breast feeding.
Erythromycin Counseling:  I discussed with the patient the risks of erythromycin including but not limited to GI upset, allergic reaction, drug rash, diarrhea, increase in liver enzymes, and yeast infections.
Dapsone Counseling: I discussed with the patient the risks of dapsone including but not limited to hemolytic anemia, agranulocytosis, rashes, methemoglobinemia, kidney failure, peripheral neuropathy, headaches, GI upset, and liver toxicity. Patients who start dapsone require monitoring including baseline LFTs and weekly CBCs for the first month, then every month thereafter. The patient verbalized understanding of the proper use and possible adverse effects of dapsone. All of the patient's questions and concerns were addressed.
Topical Clindamycin Counseling: Patient counseled that this medication may cause skin irritation or allergic reactions. In the event of skin irritation, the patient was advised to reduce the amount of the drug applied or use it less frequently. The patient verbalized understanding of the proper use and possible adverse effects of clindamycin. All of the patient's questions and concerns were addressed.
Birth Control Pills Counseling: Birth Control Pill Counseling: I discussed with the patient the potential side effects of OCPs including but not limited to increased risk of stroke, heart attack, thrombophlebitis, deep venous thrombosis, hepatic adenomas, breast changes, GI upset, headaches, and depression. The patient verbalized understanding of the proper use and possible adverse effects of OCPs. All of the patient's questions and concerns were addressed.
Azithromycin Counseling:  I discussed with the patient the risks of azithromycin including but not limited to GI upset, allergic reaction, drug rash, diarrhea, and yeast infections.
Spironolactone Counseling: Patient advised regarding risks of diarrhea, abdominal pain, hyperkalemia, birth defects (for female patients), liver toxicity and renal toxicity. The patient may need blood work to monitor liver and kidney function and potassium levels while on therapy. The patient verbalized understanding of the proper use and possible adverse effects of spironolactone. All of the patient's questions and concerns were addressed.
Topical Sulfur Applications Pregnancy And Lactation Text: This medication is Pregnancy Category C and has an unknown safety profile during pregnancy. It is unknown if this topical medication is excreted in breast milk.
Include Pregnancy/Lactation Warning?: No
Birth Control Pills Pregnancy And Lactation Text: This medication should be avoided if pregnant and for the first 30 days post-partum.
Detail Level: Detailed
Isotretinoin Pregnancy And Lactation Text: This medication is Pregnancy Category X and is considered extremely dangerous during pregnancy. It is unknown if it is excreted in breast milk.
Doxycycline Pregnancy And Lactation Text: This medication is Pregnancy Category D and not consider safe during pregnancy. It is also excreted in breast milk but is considered safe for shorter treatment courses.
Bactrim Counseling:  I discussed with the patient the risks of sulfa antibiotics including but not limited to GI upset, allergic reaction, drug rash, diarrhea, dizziness, photosensitivity, and yeast infections. Rarely, more serious reactions can occur including but not limited to aplastic anemia, agranulocytosis, methemoglobinemia, blood dyscrasias, liver or kidney failure, lung infiltrates or desquamative/blistering drug rashes.
Topical Retinoid Pregnancy And Lactation Text: This medication is Pregnancy Category C. It is unknown if this medication is excreted in breast milk.
Doxycycline Counseling:  Patient counseled regarding possible photosensitivity and increased risk for sunburn. Patient instructed to avoid sunlight, if possible. When exposed to sunlight, patients should wear protective clothing, sunglasses, and sunscreen. The patient was instructed to call the office immediately if the following severe adverse effects occur:  hearing changes, easy bruising/bleeding, severe headache, or vision changes. The patient verbalized understanding of the proper use and possible adverse effects of doxycycline. All of the patient's questions and concerns were addressed.
Bactrim Pregnancy And Lactation Text: This medication is Pregnancy Category D and is known to cause fetal risk. It is also excreted in breast milk.
Minocycline Counseling: Patient advised regarding possible photosensitivity and discoloration of the teeth, skin, lips, tongue and gums. Patient instructed to avoid sunlight, if possible. When exposed to sunlight, patients should wear protective clothing, sunglasses, and sunscreen. The patient was instructed to call the office immediately if the following severe adverse effects occur:  hearing changes, easy bruising/bleeding, severe headache, or vision changes. The patient verbalized understanding of the proper use and possible adverse effects of minocycline. All of the patient's questions and concerns were addressed.
High Dose Vitamin A Counseling: Side effects reviewed, pt to contact office should one occur.
Benzoyl Peroxide Pregnancy And Lactation Text: This medication is Pregnancy Category C. It is unknown if benzoyl peroxide is excreted in breast milk.
Topical Clindamycin Pregnancy And Lactation Text: This medication is Pregnancy Category B and is considered safe during pregnancy. It is unknown if it is excreted in breast milk.
Azithromycin Pregnancy And Lactation Text: This medication is considered safe during pregnancy and is also secreted in breast milk.
Tazorac Pregnancy And Lactation Text: This medication is not safe during pregnancy. It is unknown if this medication is excreted in breast milk.
Tetracycline Counseling: Patient counseled regarding possible photosensitivity and increased risk for sunburn. Patient instructed to avoid sunlight, if possible. When exposed to sunlight, patients should wear protective clothing, sunglasses, and sunscreen. The patient was instructed to call the office immediately if the following severe adverse effects occur:  hearing changes, easy bruising/bleeding, severe headache, or vision changes. The patient verbalized understanding of the proper use and possible adverse effects of tetracycline. All of the patient's questions and concerns were addressed. Patient understands to avoid pregnancy while on therapy due to potential birth defects.
Topical Sulfur Applications Counseling: Topical Sulfur Counseling: Patient counseled that this medication may cause skin irritation or allergic reactions. In the event of skin irritation, the patient was advised to reduce the amount of the drug applied or use it less frequently. The patient verbalized understanding of the proper use and possible adverse effects of topical sulfur application. All of the patient's questions and concerns were addressed.
Isotretinoin Counseling: Patient should get monthly blood tests, not donate blood, not drive at night if vision affected, not share medication, and not undergo elective surgery for 6 months after tx completed. Side effects reviewed, pt to contact office should one occur.
Erythromycin Pregnancy And Lactation Text: This medication is Pregnancy Category B and is considered safe during pregnancy. It is also excreted in breast milk.
Tazorac Counseling:  Patient advised that medication is irritating and drying. Patient may need to apply sparingly and wash off after an hour before eventually leaving it on overnight. The patient verbalized understanding of the proper use and possible adverse effects of tazorac. All of the patient's questions and concerns were addressed.

## 2019-11-15 ENCOUNTER — APPOINTMENT (RX ONLY)
Dept: URBAN - METROPOLITAN AREA CLINIC 54 | Facility: CLINIC | Age: 24
Setting detail: DERMATOLOGY
End: 2019-11-15

## 2019-11-15 ENCOUNTER — RX ONLY (OUTPATIENT)
Age: 24
Setting detail: RX ONLY
End: 2019-11-15

## 2019-11-15 DIAGNOSIS — L70.0 ACNE VULGARIS: ICD-10-CM | Status: WELL CONTROLLED

## 2019-11-15 DIAGNOSIS — L66.89 OTHER CICATRICIAL ALOPECIA: ICD-10-CM | Status: RESOLVING

## 2019-11-15 DIAGNOSIS — L66.8 OTHER CICATRICIAL ALOPECIA: ICD-10-CM | Status: RESOLVING

## 2019-11-15 PROCEDURE — ? COUNSELING

## 2019-11-15 PROCEDURE — ? TREATMENT REGIMEN

## 2019-11-15 PROCEDURE — 99213 OFFICE O/P EST LOW 20 MIN: CPT

## 2019-11-15 RX ORDER — CLINDAMYCIN PHOSPHATE 10 MG/ML
SOLUTION TOPICAL BID
Qty: 1 | Refills: 2 | Status: ERX

## 2019-11-15 ASSESSMENT — LOCATION SIMPLE DESCRIPTION DERM
LOCATION SIMPLE: RIGHT LIP
LOCATION SIMPLE: RIGHT FOREHEAD
LOCATION SIMPLE: RIGHT CHEEK
LOCATION SIMPLE: LEFT CHEEK
LOCATION SIMPLE: POSTERIOR SCALP

## 2019-11-15 ASSESSMENT — LOCATION DETAILED DESCRIPTION DERM
LOCATION DETAILED: RIGHT LOWER CUTANEOUS LIP
LOCATION DETAILED: RIGHT CENTRAL MALAR CHEEK
LOCATION DETAILED: RIGHT MEDIAL FOREHEAD
LOCATION DETAILED: LEFT CENTRAL MALAR CHEEK
LOCATION DETAILED: LEFT SUPERIOR OCCIPITAL SCALP

## 2019-11-15 ASSESSMENT — LOCATION ZONE DERM
LOCATION ZONE: FACE
LOCATION ZONE: SCALP
LOCATION ZONE: LIP

## 2019-11-15 NOTE — PROCEDURE: TREATMENT REGIMEN
Continue Regimen: Benzoyl peroxide 5% apply to face Twice a day\\nClindamycin 1% Apply to face Twice a day\\nSulfacetamide 10% Apply to face Twice a da
Detail Level: Zone
Discontinue Regimen: Seysara 100mg once daily.
Initiate Treatment: Clindamycin 1% solution twice daily.

## 2019-11-15 NOTE — PROCEDURE: MIPS QUALITY
Quality 130: Documentation Of Current Medications In The Medical Record: Current Medications Documented
Detail Level: Detailed
Quality 110: Preventive Care And Screening: Influenza Immunization: Influenza immunization was not ordered or administered, reason not given
Quality 431: Preventive Care And Screening: Unhealthy Alcohol Use - Screening: Patient screened for unhealthy alcohol use using a single question and scores less than 2 times per year
Quality 226: Preventive Care And Screening: Tobacco Use: Screening And Cessation Intervention: Tobacco Screening not Performed for Unknown Reasons

## 2019-11-15 NOTE — PROCEDURE: COUNSELING
Azithromycin Pregnancy And Lactation Text: This medication is considered safe during pregnancy and is also secreted in breast milk.
Birth Control Pills Pregnancy And Lactation Text: This medication should be avoided if pregnant and for the first 30 days post-partum.
Isotretinoin Counseling: Patient should get monthly blood tests, not donate blood, not drive at night if vision affected, not share medication, and not undergo elective surgery for 6 months after tx completed. Side effects reviewed, pt to contact office should one occur.
Doxycycline Pregnancy And Lactation Text: This medication is Pregnancy Category D and not consider safe during pregnancy. It is also excreted in breast milk but is considered safe for shorter treatment courses.
Minocycline Pregnancy And Lactation Text: This medication is Pregnancy Category D and not consider safe during pregnancy. It is also excreted in breast milk.
Tazorac Counseling:  Patient advised that medication is irritating and drying. Patient may need to apply sparingly and wash off after an hour before eventually leaving it on overnight. The patient verbalized understanding of the proper use and possible adverse effects of tazorac. All of the patient's questions and concerns were addressed.
Topical Retinoid Pregnancy And Lactation Text: This medication is Pregnancy Category C. It is unknown if this medication is excreted in breast milk.
High Dose Vitamin A Counseling: Side effects reviewed, pt to contact office should one occur.
High Dose Vitamin A Pregnancy And Lactation Text: High dose vitamin A therapy is contraindicated during pregnancy and breast feeding.
Dapsone Pregnancy And Lactation Text: This medication is Pregnancy Category C and is not considered safe during pregnancy or breast feeding.
Doxycycline Counseling:  Patient counseled regarding possible photosensitivity and increased risk for sunburn. Patient instructed to avoid sunlight, if possible. When exposed to sunlight, patients should wear protective clothing, sunglasses, and sunscreen. The patient was instructed to call the office immediately if the following severe adverse effects occur:  hearing changes, easy bruising/bleeding, severe headache, or vision changes. The patient verbalized understanding of the proper use and possible adverse effects of doxycycline. All of the patient's questions and concerns were addressed.
Use Enhanced Medication Counseling?: No
Azithromycin Counseling:  I discussed with the patient the risks of azithromycin including but not limited to GI upset, allergic reaction, drug rash, diarrhea, and yeast infections.
Tetracycline Counseling: Patient counseled regarding possible photosensitivity and increased risk for sunburn. Patient instructed to avoid sunlight, if possible. When exposed to sunlight, patients should wear protective clothing, sunglasses, and sunscreen. The patient was instructed to call the office immediately if the following severe adverse effects occur:  hearing changes, easy bruising/bleeding, severe headache, or vision changes. The patient verbalized understanding of the proper use and possible adverse effects of tetracycline. All of the patient's questions and concerns were addressed. Patient understands to avoid pregnancy while on therapy due to potential birth defects.
Benzoyl Peroxide Pregnancy And Lactation Text: This medication is Pregnancy Category C. It is unknown if benzoyl peroxide is excreted in breast milk.
Topical Sulfur Applications Counseling: Topical Sulfur Counseling: Patient counseled that this medication may cause skin irritation or allergic reactions. In the event of skin irritation, the patient was advised to reduce the amount of the drug applied or use it less frequently. The patient verbalized understanding of the proper use and possible adverse effects of topical sulfur application. All of the patient's questions and concerns were addressed.
Detail Level: Detailed
Topical Retinoid counseling:  Patient advised to apply a pea-sized amount only at bedtime and wait 30 minutes after washing their face before applying. If too drying, patient may add a non-comedogenic moisturizer. The patient verbalized understanding of the proper use and possible adverse effects of retinoids. All of the patient's questions and concerns were addressed.
Bactrim Counseling:  I discussed with the patient the risks of sulfa antibiotics including but not limited to GI upset, allergic reaction, drug rash, diarrhea, dizziness, photosensitivity, and yeast infections. Rarely, more serious reactions can occur including but not limited to aplastic anemia, agranulocytosis, methemoglobinemia, blood dyscrasias, liver or kidney failure, lung infiltrates or desquamative/blistering drug rashes.
Spironolactone Counseling: Patient advised regarding risks of diarrhea, abdominal pain, hyperkalemia, birth defects (for female patients), liver toxicity and renal toxicity. The patient may need blood work to monitor liver and kidney function and potassium levels while on therapy. The patient verbalized understanding of the proper use and possible adverse effects of spironolactone. All of the patient's questions and concerns were addressed.
Erythromycin Counseling:  I discussed with the patient the risks of erythromycin including but not limited to GI upset, allergic reaction, drug rash, diarrhea, increase in liver enzymes, and yeast infections.
Topical Clindamycin Counseling: Patient counseled that this medication may cause skin irritation or allergic reactions. In the event of skin irritation, the patient was advised to reduce the amount of the drug applied or use it less frequently. The patient verbalized understanding of the proper use and possible adverse effects of clindamycin. All of the patient's questions and concerns were addressed.
Bactrim Pregnancy And Lactation Text: This medication is Pregnancy Category D and is known to cause fetal risk. It is also excreted in breast milk.
Erythromycin Pregnancy And Lactation Text: This medication is Pregnancy Category B and is considered safe during pregnancy. It is also excreted in breast milk.
Isotretinoin Pregnancy And Lactation Text: This medication is Pregnancy Category X and is considered extremely dangerous during pregnancy. It is unknown if it is excreted in breast milk.
Dapsone Counseling: I discussed with the patient the risks of dapsone including but not limited to hemolytic anemia, agranulocytosis, rashes, methemoglobinemia, kidney failure, peripheral neuropathy, headaches, GI upset, and liver toxicity. Patients who start dapsone require monitoring including baseline LFTs and weekly CBCs for the first month, then every month thereafter. The patient verbalized understanding of the proper use and possible adverse effects of dapsone. All of the patient's questions and concerns were addressed.
Minocycline Counseling: Patient advised regarding possible photosensitivity and discoloration of the teeth, skin, lips, tongue and gums. Patient instructed to avoid sunlight, if possible. When exposed to sunlight, patients should wear protective clothing, sunglasses, and sunscreen. The patient was instructed to call the office immediately if the following severe adverse effects occur:  hearing changes, easy bruising/bleeding, severe headache, or vision changes. The patient verbalized understanding of the proper use and possible adverse effects of minocycline. All of the patient's questions and concerns were addressed.
Topical Clindamycin Pregnancy And Lactation Text: This medication is Pregnancy Category B and is considered safe during pregnancy. It is unknown if it is excreted in breast milk.
Birth Control Pills Counseling: Birth Control Pill Counseling: I discussed with the patient the potential side effects of OCPs including but not limited to increased risk of stroke, heart attack, thrombophlebitis, deep venous thrombosis, hepatic adenomas, breast changes, GI upset, headaches, and depression. The patient verbalized understanding of the proper use and possible adverse effects of OCPs. All of the patient's questions and concerns were addressed.
Tazorac Pregnancy And Lactation Text: This medication is not safe during pregnancy. It is unknown if this medication is excreted in breast milk.
Benzoyl Peroxide Counseling: Patient counseled that medicine may cause skin irritation and bleach clothing. In the event of skin irritation, the patient was advised to reduce the amount of the drug applied or use it less frequently. The patient verbalized understanding of the proper use and possible adverse effects of benzoyl peroxide. All of the patient's questions and concerns were addressed.
Topical Sulfur Applications Pregnancy And Lactation Text: This medication is Pregnancy Category C and has an unknown safety profile during pregnancy. It is unknown if this topical medication is excreted in breast milk.
Spironolactone Pregnancy And Lactation Text: This medication can cause feminization of the male fetus and should be avoided during pregnancy. The active metabolite is also found in breast milk.

## 2019-12-23 NOTE — PROCEDURE: COUNSELING
John Wright from [employer] called stating that she tried putting the patient's Humira through but it states it needs prior [facility]  She stated she will fax over the sheet so we can fill it out and send it back  High Dose Vitamin A Pregnancy And Lactation Text: High dose vitamin A therapy is contraindicated during pregnancy and breast feeding.

## 2020-02-13 ENCOUNTER — APPOINTMENT (RX ONLY)
Dept: URBAN - METROPOLITAN AREA CLINIC 54 | Facility: CLINIC | Age: 25
Setting detail: DERMATOLOGY
End: 2020-02-13

## 2020-02-13 DIAGNOSIS — L70.0 ACNE VULGARIS: ICD-10-CM

## 2020-02-13 DIAGNOSIS — L0390 CELLULITIS AND ABSCESS OF UNSPECIFIED SITES: ICD-10-CM

## 2020-02-13 DIAGNOSIS — L66.89 OTHER CICATRICIAL ALOPECIA: ICD-10-CM

## 2020-02-13 DIAGNOSIS — L0391 CELLULITIS AND ABSCESS OF UNSPECIFIED SITES: ICD-10-CM

## 2020-02-13 DIAGNOSIS — L66.8 OTHER CICATRICIAL ALOPECIA: ICD-10-CM

## 2020-02-13 PROBLEM — L02.811 CUTANEOUS ABSCESS OF HEAD [ANY PART, EXCEPT FACE]: Status: ACTIVE | Noted: 2020-02-13

## 2020-02-13 PROCEDURE — ? INCISION AND DRAINAGE

## 2020-02-13 PROCEDURE — 11900 INJECT SKIN LESIONS </W 7: CPT

## 2020-02-13 PROCEDURE — ? COUNSELING

## 2020-02-13 PROCEDURE — ? TREATMENT REGIMEN

## 2020-02-13 PROCEDURE — ? ORDER TESTS

## 2020-02-13 PROCEDURE — 10061 I&D ABSCESS COMP/MULTIPLE: CPT

## 2020-02-13 PROCEDURE — ? PRESCRIPTION

## 2020-02-13 PROCEDURE — 99213 OFFICE O/P EST LOW 20 MIN: CPT | Mod: 25

## 2020-02-13 PROCEDURE — ? INTRALESIONAL KENALOG

## 2020-02-13 ASSESSMENT — LOCATION ZONE DERM
LOCATION ZONE: SCALP
LOCATION ZONE: LIP
LOCATION ZONE: FACE

## 2020-02-13 ASSESSMENT — LOCATION DETAILED DESCRIPTION DERM
LOCATION DETAILED: LEFT SUPERIOR PARIETAL SCALP
LOCATION DETAILED: LEFT CENTRAL PARIETAL SCALP
LOCATION DETAILED: RIGHT LOWER CUTANEOUS LIP
LOCATION DETAILED: LEFT SUPERIOR OCCIPITAL SCALP
LOCATION DETAILED: LEFT CENTRAL MALAR CHEEK
LOCATION DETAILED: RIGHT MEDIAL FOREHEAD
LOCATION DETAILED: RIGHT CENTRAL MALAR CHEEK

## 2020-02-13 ASSESSMENT — LOCATION SIMPLE DESCRIPTION DERM
LOCATION SIMPLE: RIGHT CHEEK
LOCATION SIMPLE: POSTERIOR SCALP
LOCATION SIMPLE: RIGHT FOREHEAD
LOCATION SIMPLE: LEFT CHEEK
LOCATION SIMPLE: SCALP
LOCATION SIMPLE: RIGHT LIP

## 2020-02-13 NOTE — PROCEDURE: INCISION AND DRAINAGE
Body Location Override (Optional - Billing Will Still Be Based On Selected Body Map Location If Applicable): left Vertex
Detail Level: Detailed
Lesion Type: Abscess
I&D Type: complicated
Method: sterile needle
Curette: No
Anesthesia Type: 1% lidocaine with epinephrine
Anesthesia Volume In Cc: 3
Size Of Lesion In Cm (Optional But May Be Required For Some Insurances): 0
Wound Care: Bacitracin
Dressing: dry sterile dressing
Curette Text (Optional): After the contents were expressed a curette was used to partially remove the cyst wall.
Epidermal Sutures: 4-0 Ethilon
Epidermal Closure: simple interrupted
Suture Text: The incision was partially closed with
Consent was obtained and risks were reviewed including but not limited to delayed wound healing, infection, need for multiple I and D's, and pain.
Post-Care Instructions: I reviewed with the patient in detail post-care instructions. Patient should keep wound covered and call the office should any redness, pain, swelling or worsening occur.

## 2020-02-13 NOTE — PROCEDURE: ORDER TESTS
Performing Laboratory: [id number]
Expected Date Of Service: 02/13/2020
Billing Type: [employer]
Bill For Surgical Tray: no

## 2020-02-13 NOTE — PROCEDURE: COUNSELING
Tetracycline Counseling: Patient counseled regarding possible photosensitivity and increased risk for sunburn. Patient instructed to avoid sunlight, if possible. When exposed to sunlight, patients should wear protective clothing, sunglasses, and sunscreen. The patient was instructed to call the office immediately if the following severe adverse effects occur:  hearing changes, easy bruising/bleeding, severe headache, or vision changes. The patient verbalized understanding of the proper use and possible adverse effects of tetracycline. All of the patient's questions and concerns were addressed. Patient understands to avoid pregnancy while on therapy due to potential birth defects.
Spironolactone Counseling: Patient advised regarding risks of diarrhea, abdominal pain, hyperkalemia, birth defects (for female patients), liver toxicity and renal toxicity. The patient may need blood work to monitor liver and kidney function and potassium levels while on therapy. The patient verbalized understanding of the proper use and possible adverse effects of spironolactone. All of the patient's questions and concerns were addressed.
Isotretinoin Counseling: Patient should get monthly blood tests, not donate blood, not drive at night if vision affected, not share medication, and not undergo elective surgery for 6 months after tx completed. Side effects reviewed, pt to contact office should one occur.
Use Enhanced Medication Counseling?: No
Bactrim Pregnancy And Lactation Text: This medication is Pregnancy Category D and is known to cause fetal risk. It is also excreted in breast milk.
Doxycycline Counseling:  Patient counseled regarding possible photosensitivity and increased risk for sunburn. Patient instructed to avoid sunlight, if possible. When exposed to sunlight, patients should wear protective clothing, sunglasses, and sunscreen. The patient was instructed to call the office immediately if the following severe adverse effects occur:  hearing changes, easy bruising/bleeding, severe headache, or vision changes. The patient verbalized understanding of the proper use and possible adverse effects of doxycycline. All of the patient's questions and concerns were addressed.
Topical Clindamycin Pregnancy And Lactation Text: This medication is Pregnancy Category B and is considered safe during pregnancy. It is unknown if it is excreted in breast milk.
High Dose Vitamin A Pregnancy And Lactation Text: High dose vitamin A therapy is contraindicated during pregnancy and breast feeding.
Isotretinoin Pregnancy And Lactation Text: This medication is Pregnancy Category X and is considered extremely dangerous during pregnancy. It is unknown if it is excreted in breast milk.
Dapsone Counseling: I discussed with the patient the risks of dapsone including but not limited to hemolytic anemia, agranulocytosis, rashes, methemoglobinemia, kidney failure, peripheral neuropathy, headaches, GI upset, and liver toxicity. Patients who start dapsone require monitoring including baseline LFTs and weekly CBCs for the first month, then every month thereafter. The patient verbalized understanding of the proper use and possible adverse effects of dapsone. All of the patient's questions and concerns were addressed.
Benzoyl Peroxide Counseling: Patient counseled that medicine may cause skin irritation and bleach clothing. In the event of skin irritation, the patient was advised to reduce the amount of the drug applied or use it less frequently. The patient verbalized understanding of the proper use and possible adverse effects of benzoyl peroxide. All of the patient's questions and concerns were addressed.
Doxycycline Pregnancy And Lactation Text: This medication is Pregnancy Category D and not consider safe during pregnancy. It is also excreted in breast milk but is considered safe for shorter treatment courses.
Topical Retinoid Pregnancy And Lactation Text: This medication is Pregnancy Category C. It is unknown if this medication is excreted in breast milk.
Erythromycin Counseling:  I discussed with the patient the risks of erythromycin including but not limited to GI upset, allergic reaction, drug rash, diarrhea, increase in liver enzymes, and yeast infections.
Topical Retinoid counseling:  Patient advised to apply a pea-sized amount only at bedtime and wait 30 minutes after washing their face before applying. If too drying, patient may add a non-comedogenic moisturizer. The patient verbalized understanding of the proper use and possible adverse effects of retinoids. All of the patient's questions and concerns were addressed.
Topical Sulfur Applications Pregnancy And Lactation Text: This medication is Pregnancy Category C and has an unknown safety profile during pregnancy. It is unknown if this topical medication is excreted in breast milk.
Benzoyl Peroxide Pregnancy And Lactation Text: This medication is Pregnancy Category C. It is unknown if benzoyl peroxide is excreted in breast milk.
Erythromycin Pregnancy And Lactation Text: This medication is Pregnancy Category B and is considered safe during pregnancy. It is also excreted in breast milk.
Minocycline Pregnancy And Lactation Text: This medication is Pregnancy Category D and not consider safe during pregnancy. It is also excreted in breast milk.
Dapsone Pregnancy And Lactation Text: This medication is Pregnancy Category C and is not considered safe during pregnancy or breast feeding.
Topical Clindamycin Counseling: Patient counseled that this medication may cause skin irritation or allergic reactions. In the event of skin irritation, the patient was advised to reduce the amount of the drug applied or use it less frequently. The patient verbalized understanding of the proper use and possible adverse effects of clindamycin. All of the patient's questions and concerns were addressed.
Birth Control Pills Counseling: Birth Control Pill Counseling: I discussed with the patient the potential side effects of OCPs including but not limited to increased risk of stroke, heart attack, thrombophlebitis, deep venous thrombosis, hepatic adenomas, breast changes, GI upset, headaches, and depression. The patient verbalized understanding of the proper use and possible adverse effects of OCPs. All of the patient's questions and concerns were addressed.
Minocycline Counseling: Patient advised regarding possible photosensitivity and discoloration of the teeth, skin, lips, tongue and gums. Patient instructed to avoid sunlight, if possible. When exposed to sunlight, patients should wear protective clothing, sunglasses, and sunscreen. The patient was instructed to call the office immediately if the following severe adverse effects occur:  hearing changes, easy bruising/bleeding, severe headache, or vision changes. The patient verbalized understanding of the proper use and possible adverse effects of minocycline. All of the patient's questions and concerns were addressed.
Azithromycin Pregnancy And Lactation Text: This medication is considered safe during pregnancy and is also secreted in breast milk.
Topical Sulfur Applications Counseling: Topical Sulfur Counseling: Patient counseled that this medication may cause skin irritation or allergic reactions. In the event of skin irritation, the patient was advised to reduce the amount of the drug applied or use it less frequently. The patient verbalized understanding of the proper use and possible adverse effects of topical sulfur application. All of the patient's questions and concerns were addressed.
Detail Level: Detailed
Spironolactone Pregnancy And Lactation Text: This medication can cause feminization of the male fetus and should be avoided during pregnancy. The active metabolite is also found in breast milk.
High Dose Vitamin A Counseling: Side effects reviewed, pt to contact office should one occur.
Bactrim Counseling:  I discussed with the patient the risks of sulfa antibiotics including but not limited to GI upset, allergic reaction, drug rash, diarrhea, dizziness, photosensitivity, and yeast infections. Rarely, more serious reactions can occur including but not limited to aplastic anemia, agranulocytosis, methemoglobinemia, blood dyscrasias, liver or kidney failure, lung infiltrates or desquamative/blistering drug rashes.
Tazorac Counseling:  Patient advised that medication is irritating and drying. Patient may need to apply sparingly and wash off after an hour before eventually leaving it on overnight. The patient verbalized understanding of the proper use and possible adverse effects of tazorac. All of the patient's questions and concerns were addressed.
Tazorac Pregnancy And Lactation Text: This medication is not safe during pregnancy. It is unknown if this medication is excreted in breast milk.
Birth Control Pills Pregnancy And Lactation Text: This medication should be avoided if pregnant and for the first 30 days post-partum.
Azithromycin Counseling:  I discussed with the patient the risks of azithromycin including but not limited to GI upset, allergic reaction, drug rash, diarrhea, and yeast infections.

## 2020-02-13 NOTE — PROCEDURE: TREATMENT REGIMEN
Continue Regimen: Benzoyl peroxide 5% cleanser apply to face Twice a day\\nClindamycin 1% solution Apply to face Twice a day\\nSulfacetamide 10% lotion Apply to face Twice a day
Initiate Treatment: Doxycycline 100mg twice daily.
Detail Level: Zone
Continue Regimen: Clindamycin 1% solution twice daily.

## 2020-04-22 ENCOUNTER — APPOINTMENT (RX ONLY)
Dept: URBAN - METROPOLITAN AREA CLINIC 54 | Facility: CLINIC | Age: 25
Setting detail: DERMATOLOGY
End: 2020-04-22

## 2020-04-22 ENCOUNTER — RX ONLY (OUTPATIENT)
Age: 25
Setting detail: RX ONLY
End: 2020-04-22

## 2020-04-22 DIAGNOSIS — L0391 CELLULITIS AND ABSCESS OF UNSPECIFIED SITES: ICD-10-CM

## 2020-04-22 DIAGNOSIS — L0390 CELLULITIS AND ABSCESS OF UNSPECIFIED SITES: ICD-10-CM

## 2020-04-22 DIAGNOSIS — L29.8 OTHER PRURITUS: ICD-10-CM

## 2020-04-22 DIAGNOSIS — L66.8 OTHER CICATRICIAL ALOPECIA: ICD-10-CM | Status: WORSENING

## 2020-04-22 DIAGNOSIS — L29.89 OTHER PRURITUS: ICD-10-CM

## 2020-04-22 DIAGNOSIS — L66.89 OTHER CICATRICIAL ALOPECIA: ICD-10-CM | Status: WORSENING

## 2020-04-22 PROBLEM — L02.811 CUTANEOUS ABSCESS OF HEAD [ANY PART, EXCEPT FACE]: Status: ACTIVE | Noted: 2020-04-22

## 2020-04-22 PROCEDURE — 11900 INJECT SKIN LESIONS </W 7: CPT

## 2020-04-22 PROCEDURE — ? INTRALESIONAL KENALOG

## 2020-04-22 PROCEDURE — 10060 I&D ABSCESS SIMPLE/SINGLE: CPT

## 2020-04-22 PROCEDURE — ? TREATMENT REGIMEN

## 2020-04-22 PROCEDURE — ? INCISION AND DRAINAGE

## 2020-04-22 PROCEDURE — ? OTHER

## 2020-04-22 PROCEDURE — ? COUNSELING

## 2020-04-22 PROCEDURE — 99213 OFFICE O/P EST LOW 20 MIN: CPT | Mod: 25

## 2020-04-22 ASSESSMENT — LOCATION SIMPLE DESCRIPTION DERM
LOCATION SIMPLE: RIGHT SCALP
LOCATION SIMPLE: RIGHT SCALP
LOCATION SIMPLE: SCALP
LOCATION SIMPLE: SCALP

## 2020-04-22 ASSESSMENT — LOCATION DETAILED DESCRIPTION DERM
LOCATION DETAILED: LEFT SUPERIOR PARIETAL SCALP
LOCATION DETAILED: LEFT SUPERIOR PARIETAL SCALP
LOCATION DETAILED: RIGHT CENTRAL FRONTAL SCALP
LOCATION DETAILED: RIGHT CENTRAL FRONTAL SCALP

## 2020-04-22 ASSESSMENT — LOCATION ZONE DERM
LOCATION ZONE: SCALP
LOCATION ZONE: SCALP

## 2020-04-22 NOTE — PROCEDURE: MIPS QUALITY
Quality 226: Preventive Care And Screening: Tobacco Use: Screening And Cessation Intervention: Patient screened for tobacco use and is an ex/non-smoker
Quality 130: Documentation Of Current Medications In The Medical Record: Current Medications Documented
Quality 431: Preventive Care And Screening: Unhealthy Alcohol Use - Screening: Patient screened for unhealthy alcohol use using a single question and scores less than 2 times per year
Quality 110: Preventive Care And Screening: Influenza Immunization: Influenza immunization was not ordered or administered, reason not given
Detail Level: Detailed

## 2020-04-22 NOTE — PROCEDURE: INCISION AND DRAINAGE
Detail Level: Detailed
Lesion Type: Abscess
Method: sterile needle
Curette: No
Anesthesia Type: 0.5% lidocaine with 1:400,000 epinephrine
Anesthesia Volume In Cc: 0.3
Size Of Lesion In Cm (Optional But May Be Required For Some Insurances): 0
Drainage Amount?: moderate
Drainage Type?: clear
Wound Care: Bacitracin
Dressing: dry sterile dressing
Epidermal Sutures: 4-0 Ethilon
Epidermal Closure: simple interrupted
Suture Text: The incision was partially closed with
Preparation Text: The area was prepped in the usual clean fashion.
Curette Text (Optional): After the contents were expressed a curette was used to partially remove the cyst wall.
Consent was obtained and risks were reviewed including but not limited to delayed wound healing, infection, need for multiple I and D's, and pain.
Post-Care Instructions: I reviewed with the patient in detail post-care instructions. Patient should keep wound covered and call the office should any redness, pain, swelling or worsening occur.

## 2020-04-22 NOTE — PROCEDURE: INTRALESIONAL KENALOG
Ndc# For Kenalog Only: 6671-8892-21
Kenalog Preparation: Kenalog
Administered By (Optional): Serge Eldridge
Concentration Of Solution Injected (Mg/Ml): 3.0
Consent: The risks of atrophy were reviewed with the patient.
Include Z78.9 (Other Specified Conditions Influencing Health Status) As An Associated Diagnosis?: No
Detail Level: Detailed
X Size Of Lesion In Cm (Optional): 0
Total Volume Injected (Ccs- Only Use Numbers And Decimals): 0.5
Medical Necessity Clause: This procedure was medically necessary because the lesions that were treated were:

## 2020-04-22 NOTE — PROCEDURE: OTHER
Other (Free Text): Culture swab was collected and sent to [facility]
Note Text (......Xxx Chief Complaint.): This diagnosis correlates with the
Detail Level: Zone

## 2020-04-22 NOTE — PROCEDURE: TREATMENT REGIMEN
Detail Level: Zone
Initiate Treatment: Doxycycline 100 mg take 1 capsule by mouth 30 minutes after meal
Continue Regimen: Clindamycin 1% solution apply to affected area on scalp twice a day

## 2020-04-24 ENCOUNTER — APPOINTMENT (RX ONLY)
Dept: URBAN - METROPOLITAN AREA CLINIC 54 | Facility: CLINIC | Age: 25
Setting detail: DERMATOLOGY
End: 2020-04-24

## 2020-04-24 ENCOUNTER — RX ONLY (OUTPATIENT)
Age: 25
Setting detail: RX ONLY
End: 2020-04-24

## 2020-04-24 DIAGNOSIS — L66.8 OTHER CICATRICIAL ALOPECIA: ICD-10-CM | Status: STABLE

## 2020-04-24 DIAGNOSIS — L29.8 OTHER PRURITUS: ICD-10-CM

## 2020-04-24 DIAGNOSIS — L29.89 OTHER PRURITUS: ICD-10-CM

## 2020-04-24 DIAGNOSIS — L0390 CELLULITIS AND ABSCESS OF UNSPECIFIED SITES: ICD-10-CM | Status: IMPROVED

## 2020-04-24 DIAGNOSIS — L66.89 OTHER CICATRICIAL ALOPECIA: ICD-10-CM | Status: STABLE

## 2020-04-24 DIAGNOSIS — L0391 CELLULITIS AND ABSCESS OF UNSPECIFIED SITES: ICD-10-CM | Status: IMPROVED

## 2020-04-24 PROBLEM — L02.811 CUTANEOUS ABSCESS OF HEAD [ANY PART, EXCEPT FACE]: Status: ACTIVE | Noted: 2020-04-24

## 2020-04-24 PROCEDURE — ? TREATMENT REGIMEN

## 2020-04-24 PROCEDURE — ? COUNSELING

## 2020-04-24 PROCEDURE — 99213 OFFICE O/P EST LOW 20 MIN: CPT | Mod: 24,25

## 2020-04-24 PROCEDURE — 11900 INJECT SKIN LESIONS </W 7: CPT | Mod: 79

## 2020-04-24 PROCEDURE — ? INTRALESIONAL KENALOG

## 2020-04-24 RX ORDER — CLINDAMYCIN PHOSPHATE 10 MG/ML
SOLUTION TOPICAL BID
Qty: 1 | Refills: 2 | Status: ERX

## 2020-04-24 ASSESSMENT — LOCATION SIMPLE DESCRIPTION DERM
LOCATION SIMPLE: SCALP
LOCATION SIMPLE: RIGHT SCALP
LOCATION SIMPLE: ANTERIOR SCALP
LOCATION SIMPLE: SCALP

## 2020-04-24 ASSESSMENT — LOCATION ZONE DERM
LOCATION ZONE: SCALP
LOCATION ZONE: SCALP

## 2020-04-24 ASSESSMENT — LOCATION DETAILED DESCRIPTION DERM
LOCATION DETAILED: MID-FRONTAL SCALP
LOCATION DETAILED: RIGHT SUPERIOR PARIETAL SCALP
LOCATION DETAILED: RIGHT SUPERIOR PARIETAL SCALP
LOCATION DETAILED: RIGHT MEDIAL FRONTAL SCALP

## 2020-04-24 NOTE — PROCEDURE: INTRALESIONAL KENALOG
Administered By (Optional): Nawaf Malhotra
Concentration Of Solution Injected (Mg/Ml): 3.0
Detail Level: Detailed
Ndc# For Kenalog Only: 3414-9710-86
Medical Necessity Clause: This procedure was medically necessary because the lesions that were treated were:
X Size Of Lesion In Cm (Optional): 0
Include Z78.9 (Other Specified Conditions Influencing Health Status) As An Associated Diagnosis?: No
Kenalog Preparation: Kenalog
Consent: The risks of atrophy were reviewed with the patient.
Total Volume Injected (Ccs- Only Use Numbers And Decimals): 0.5

## 2020-05-05 ENCOUNTER — APPOINTMENT (RX ONLY)
Dept: URBAN - METROPOLITAN AREA CLINIC 54 | Facility: CLINIC | Age: 25
Setting detail: DERMATOLOGY
End: 2020-05-05

## 2020-05-05 DIAGNOSIS — L66.8 OTHER CICATRICIAL ALOPECIA: ICD-10-CM | Status: STABLE

## 2020-05-05 DIAGNOSIS — L66.89 OTHER CICATRICIAL ALOPECIA: ICD-10-CM | Status: STABLE

## 2020-05-05 DIAGNOSIS — L29.89 OTHER PRURITUS: ICD-10-CM

## 2020-05-05 DIAGNOSIS — L0391 CELLULITIS AND ABSCESS OF UNSPECIFIED SITES: ICD-10-CM | Status: STABLE

## 2020-05-05 DIAGNOSIS — L0390 CELLULITIS AND ABSCESS OF UNSPECIFIED SITES: ICD-10-CM | Status: STABLE

## 2020-05-05 DIAGNOSIS — L29.8 OTHER PRURITUS: ICD-10-CM

## 2020-05-05 PROBLEM — L02.811 CUTANEOUS ABSCESS OF HEAD [ANY PART, EXCEPT FACE]: Status: ACTIVE | Noted: 2020-05-05

## 2020-05-05 PROCEDURE — 11900 INJECT SKIN LESIONS </W 7: CPT

## 2020-05-05 PROCEDURE — ? INTRALESIONAL KENALOG

## 2020-05-05 PROCEDURE — ? INCISION AND DRAINAGE

## 2020-05-05 PROCEDURE — ? TREATMENT REGIMEN

## 2020-05-05 PROCEDURE — 10061 I&D ABSCESS COMP/MULTIPLE: CPT

## 2020-05-05 PROCEDURE — 99213 OFFICE O/P EST LOW 20 MIN: CPT | Mod: 25

## 2020-05-05 PROCEDURE — ? COUNSELING

## 2020-05-05 ASSESSMENT — LOCATION DETAILED DESCRIPTION DERM
LOCATION DETAILED: MID-FRONTAL SCALP
LOCATION DETAILED: RIGHT SUPERIOR PARIETAL SCALP
LOCATION DETAILED: RIGHT SUPERIOR PARIETAL SCALP
LOCATION DETAILED: MID-FRONTAL SCALP

## 2020-05-05 ASSESSMENT — LOCATION SIMPLE DESCRIPTION DERM
LOCATION SIMPLE: SCALP
LOCATION SIMPLE: SCALP
LOCATION SIMPLE: ANTERIOR SCALP
LOCATION SIMPLE: ANTERIOR SCALP

## 2020-05-05 ASSESSMENT — LOCATION ZONE DERM
LOCATION ZONE: SCALP
LOCATION ZONE: SCALP

## 2020-05-05 NOTE — PROCEDURE: INCISION AND DRAINAGE
Body Location Override (Optional - Billing Will Still Be Based On Selected Body Map Location If Applicable): right scalp
Detail Level: Detailed
Lesion Type: Abscess
I&D Type: complicated
Method: sterile needle
Curette: No
Anesthesia Type: 1% lidocaine with epinephrine
Anesthesia Volume In Cc: 3
Size Of Lesion In Cm (Optional But May Be Required For Some Insurances): 0
Wound Care: Bacitracin
Dressing: dry sterile dressing
Curette Text (Optional): After the contents were expressed a curette was used to partially remove the cyst wall.
Epidermal Sutures: 4-0 Ethilon
Epidermal Closure: simple interrupted
Suture Text: The incision was partially closed with
Consent was obtained and risks were reviewed including but not limited to delayed wound healing, infection, need for multiple I and D's, and pain.
Post-Care Instructions: I reviewed with the patient in detail post-care instructions. Patient should keep wound covered and call the office should any redness, pain, swelling or worsening occur.

## 2020-05-05 NOTE — PROCEDURE: INTRALESIONAL KENALOG
Medical Necessity Clause: This procedure was medically necessary because the lesions that were treated were:
Consent: The risks of atrophy were reviewed with the patient.
Detail Level: Detailed
Administered By (Optional): Ryan Mann
Include Z78.9 (Other Specified Conditions Influencing Health Status) As An Associated Diagnosis?: No
X Size Of Lesion In Cm (Optional): 0
Ndc# For Kenalog Only: 6133-4635-93
Concentration Of Solution Injected (Mg/Ml): 10.0
Kenalog Preparation: Kenalog
Total Volume Injected (Ccs- Only Use Numbers And Decimals): 0.3

## 2020-05-05 NOTE — PROCEDURE: MIPS QUALITY
Quality 226: Preventive Care And Screening: Tobacco Use: Screening And Cessation Intervention: Patient screened for tobacco use and is an ex/non-smoker
Quality 130: Documentation Of Current Medications In The Medical Record: Current Medications Documented
Quality 431: Preventive Care And Screening: Unhealthy Alcohol Use - Screening: Patient screened for unhealthy alcohol use using a single question and scores less than 2 times per year
Detail Level: Detailed
Quality 110: Preventive Care And Screening: Influenza Immunization: Influenza immunization was not ordered or administered, reason not given

## 2020-05-08 ENCOUNTER — APPOINTMENT (RX ONLY)
Dept: URBAN - METROPOLITAN AREA CLINIC 54 | Facility: CLINIC | Age: 25
Setting detail: DERMATOLOGY
End: 2020-05-08

## 2020-05-08 DIAGNOSIS — L29.89 OTHER PRURITUS: ICD-10-CM

## 2020-05-08 DIAGNOSIS — L66.8 OTHER CICATRICIAL ALOPECIA: ICD-10-CM | Status: IMPROVED

## 2020-05-08 DIAGNOSIS — L0391 CELLULITIS AND ABSCESS OF UNSPECIFIED SITES: ICD-10-CM

## 2020-05-08 DIAGNOSIS — L66.89 OTHER CICATRICIAL ALOPECIA: ICD-10-CM | Status: IMPROVED

## 2020-05-08 DIAGNOSIS — L29.8 OTHER PRURITUS: ICD-10-CM

## 2020-05-08 DIAGNOSIS — L0390 CELLULITIS AND ABSCESS OF UNSPECIFIED SITES: ICD-10-CM

## 2020-05-08 PROBLEM — L02.811 CUTANEOUS ABSCESS OF HEAD [ANY PART, EXCEPT FACE]: Status: ACTIVE | Noted: 2020-05-08

## 2020-05-08 PROCEDURE — 99213 OFFICE O/P EST LOW 20 MIN: CPT | Mod: 24,25

## 2020-05-08 PROCEDURE — ? INTRALESIONAL KENALOG

## 2020-05-08 PROCEDURE — ? COUNSELING

## 2020-05-08 PROCEDURE — 11900 INJECT SKIN LESIONS </W 7: CPT | Mod: 79

## 2020-05-08 PROCEDURE — ? TREATMENT REGIMEN

## 2020-05-08 ASSESSMENT — LOCATION DETAILED DESCRIPTION DERM
LOCATION DETAILED: RIGHT MEDIAL FRONTAL SCALP
LOCATION DETAILED: RIGHT SUPERIOR PARIETAL SCALP
LOCATION DETAILED: RIGHT SUPERIOR PARIETAL SCALP
LOCATION DETAILED: RIGHT MEDIAL FRONTAL SCALP

## 2020-05-08 ASSESSMENT — LOCATION SIMPLE DESCRIPTION DERM
LOCATION SIMPLE: SCALP
LOCATION SIMPLE: RIGHT SCALP
LOCATION SIMPLE: RIGHT SCALP
LOCATION SIMPLE: SCALP

## 2020-05-08 ASSESSMENT — LOCATION ZONE DERM
LOCATION ZONE: SCALP
LOCATION ZONE: SCALP

## 2020-05-08 NOTE — PROCEDURE: INTRALESIONAL KENALOG
Ndc# For Kenalog Only: 4764-7112-59
Include Z78.9 (Other Specified Conditions Influencing Health Status) As An Associated Diagnosis?: No
Kenalog Preparation: Kenalog
Concentration Of Solution Injected (Mg/Ml): 3.0
X Size Of Lesion In Cm (Optional): 0
Detail Level: Detailed
Medical Necessity Clause: This procedure was medically necessary because the lesions that were treated were:
Consent: The risks of atrophy were reviewed with the patient.
Administered By (Optional): Shannon Lacy
Total Volume Injected (Ccs- Only Use Numbers And Decimals): 0.8

## 2020-05-26 ENCOUNTER — RX ONLY (OUTPATIENT)
Age: 25
Setting detail: RX ONLY
End: 2020-05-26

## 2020-05-26 ENCOUNTER — APPOINTMENT (RX ONLY)
Dept: URBAN - METROPOLITAN AREA CLINIC 54 | Facility: CLINIC | Age: 25
Setting detail: DERMATOLOGY
End: 2020-05-26

## 2020-05-26 DIAGNOSIS — L29.89 OTHER PRURITUS: ICD-10-CM

## 2020-05-26 DIAGNOSIS — L29.8 OTHER PRURITUS: ICD-10-CM

## 2020-05-26 DIAGNOSIS — L66.8 OTHER CICATRICIAL ALOPECIA: ICD-10-CM

## 2020-05-26 DIAGNOSIS — L0391 CELLULITIS AND ABSCESS OF UNSPECIFIED SITES: ICD-10-CM

## 2020-05-26 DIAGNOSIS — L0390 CELLULITIS AND ABSCESS OF UNSPECIFIED SITES: ICD-10-CM

## 2020-05-26 DIAGNOSIS — L66.89 OTHER CICATRICIAL ALOPECIA: ICD-10-CM

## 2020-05-26 PROBLEM — L02.811 CUTANEOUS ABSCESS OF HEAD [ANY PART, EXCEPT FACE]: Status: ACTIVE | Noted: 2020-05-26

## 2020-05-26 PROCEDURE — ? INTRALESIONAL KENALOG

## 2020-05-26 PROCEDURE — 11900 INJECT SKIN LESIONS </W 7: CPT

## 2020-05-26 PROCEDURE — ? TREATMENT REGIMEN

## 2020-05-26 PROCEDURE — ? COUNSELING

## 2020-05-26 PROCEDURE — 99213 OFFICE O/P EST LOW 20 MIN: CPT | Mod: 25

## 2020-05-26 ASSESSMENT — LOCATION DETAILED DESCRIPTION DERM
LOCATION DETAILED: RIGHT MEDIAL FRONTAL SCALP
LOCATION DETAILED: RIGHT SUPERIOR PARIETAL SCALP
LOCATION DETAILED: RIGHT MEDIAL FRONTAL SCALP
LOCATION DETAILED: RIGHT CENTRAL PARIETAL SCALP
LOCATION DETAILED: RIGHT SUPERIOR PARIETAL SCALP

## 2020-05-26 ASSESSMENT — LOCATION ZONE DERM
LOCATION ZONE: SCALP
LOCATION ZONE: SCALP

## 2020-05-26 NOTE — PROCEDURE: TREATMENT REGIMEN
Initiate Treatment: Prednisone 10mg tablet take three by mouth once a day for 3 days then 2 tablets by mouth once a day for 2 days\\nAugmentin 875/125mg tablet take one by mouth twice a day for 10 days
Modify Regimen: Resume Doxycycline 100mg tablet take one by mouth twice a day after finishing Augmentin tablet
Detail Level: Zone
Continue Regimen: Clindamycin 1% solution apply to scalp Twice daily

## 2020-05-26 NOTE — PROCEDURE: INTRALESIONAL KENALOG
Administered By (Optional): Yuliana Rojas
Ndc# For Kenalog Only: [id number]
Include Z78.9 (Other Specified Conditions Influencing Health Status) As An Associated Diagnosis?: No
Total Volume Injected (Ccs- Only Use Numbers And Decimals): 0.7
Medical Necessity Clause: This procedure was medically necessary because the lesions that were treated were:
Lot # (Optional): [id number]
X Size Of Lesion In Cm (Optional): 0
Expiration Date (Optional): 09/2022
Consent: The risks of atrophy were reviewed with the patient.
Kenalog Preparation: Kenalog
Concentration Of Solution Injected (Mg/Ml): 10.0
Detail Level: Detailed

## 2020-06-12 ENCOUNTER — RX ONLY (OUTPATIENT)
Age: 25
Setting detail: RX ONLY
End: 2020-06-12

## 2020-06-12 ENCOUNTER — APPOINTMENT (RX ONLY)
Dept: URBAN - METROPOLITAN AREA CLINIC 54 | Facility: CLINIC | Age: 25
Setting detail: DERMATOLOGY
End: 2020-06-12

## 2020-06-12 DIAGNOSIS — L29.89 OTHER PRURITUS: ICD-10-CM

## 2020-06-12 DIAGNOSIS — L66.8 OTHER CICATRICIAL ALOPECIA: ICD-10-CM

## 2020-06-12 DIAGNOSIS — L0390 CELLULITIS AND ABSCESS OF UNSPECIFIED SITES: ICD-10-CM

## 2020-06-12 DIAGNOSIS — L66.89 OTHER CICATRICIAL ALOPECIA: ICD-10-CM

## 2020-06-12 DIAGNOSIS — L0391 CELLULITIS AND ABSCESS OF UNSPECIFIED SITES: ICD-10-CM

## 2020-06-12 DIAGNOSIS — L29.8 OTHER PRURITUS: ICD-10-CM

## 2020-06-12 PROBLEM — L02.811 CUTANEOUS ABSCESS OF HEAD [ANY PART, EXCEPT FACE]: Status: ACTIVE | Noted: 2020-06-12

## 2020-06-12 PROCEDURE — 99213 OFFICE O/P EST LOW 20 MIN: CPT | Mod: 25

## 2020-06-12 PROCEDURE — ? INTRALESIONAL KENALOG

## 2020-06-12 PROCEDURE — ? TREATMENT REGIMEN

## 2020-06-12 PROCEDURE — 11900 INJECT SKIN LESIONS </W 7: CPT

## 2020-06-12 PROCEDURE — ? COUNSELING

## 2020-06-12 ASSESSMENT — LOCATION DETAILED DESCRIPTION DERM
LOCATION DETAILED: RIGHT MEDIAL FRONTAL SCALP
LOCATION DETAILED: RIGHT SUPERIOR PARIETAL SCALP
LOCATION DETAILED: RIGHT CENTRAL PARIETAL SCALP
LOCATION DETAILED: RIGHT SUPERIOR PARIETAL SCALP
LOCATION DETAILED: RIGHT MEDIAL FRONTAL SCALP

## 2020-06-12 ASSESSMENT — LOCATION ZONE DERM
LOCATION ZONE: SCALP
LOCATION ZONE: SCALP

## 2020-06-12 NOTE — PROCEDURE: INTRALESIONAL KENALOG
Consent: The risks of atrophy were reviewed with the patient.
X Size Of Lesion In Cm (Optional): 0
Administered By (Optional): Laurie Blight
Ndc# For Kenalog Only: 0464-7248-01
Kenalog Preparation: Kenalog
Medical Necessity Clause: This procedure was medically necessary because the lesions that were treated were:
Concentration Of Solution Injected (Mg/Ml): 10.0
Expiration Date (Optional): 09/2022
Lot # (Optional): [id number]
Detail Level: Detailed
Include Z78.9 (Other Specified Conditions Influencing Health Status) As An Associated Diagnosis?: No
Total Volume Injected (Ccs- Only Use Numbers And Decimals): 0.7

## 2020-06-12 NOTE — PROCEDURE: TREATMENT REGIMEN
Continue Regimen: Clindamycin 1% solution apply to scalp Twice daily\\nDoxycycline 100mg tablet take one by mouth twice a day 30 mins after meal
Detail Level: Zone
Plan: Patient finished Prednisone taper and Augmentin tablet

## 2020-06-25 ENCOUNTER — APPOINTMENT (RX ONLY)
Dept: URBAN - METROPOLITAN AREA CLINIC 54 | Facility: CLINIC | Age: 25
Setting detail: DERMATOLOGY
End: 2020-06-25

## 2020-06-25 DIAGNOSIS — L66.8 OTHER CICATRICIAL ALOPECIA: ICD-10-CM

## 2020-06-25 DIAGNOSIS — D22 MELANOCYTIC NEVI: ICD-10-CM

## 2020-06-25 DIAGNOSIS — L66.89 OTHER CICATRICIAL ALOPECIA: ICD-10-CM

## 2020-06-25 PROBLEM — D22.9 MELANOCYTIC NEVI, UNSPECIFIED: Status: ACTIVE | Noted: 2020-06-25

## 2020-06-25 PROCEDURE — ? INCISION AND DRAINAGE

## 2020-06-25 PROCEDURE — ? INTRALESIONAL KENALOG

## 2020-06-25 PROCEDURE — ? COUNSELING

## 2020-06-25 PROCEDURE — 11900 INJECT SKIN LESIONS </W 7: CPT

## 2020-06-25 PROCEDURE — 10061 I&D ABSCESS COMP/MULTIPLE: CPT

## 2020-06-25 PROCEDURE — 99213 OFFICE O/P EST LOW 20 MIN: CPT | Mod: 25

## 2020-06-25 ASSESSMENT — LOCATION ZONE DERM
LOCATION ZONE: SCALP
LOCATION ZONE: SCALP

## 2020-06-25 ASSESSMENT — LOCATION DETAILED DESCRIPTION DERM
LOCATION DETAILED: RIGHT MEDIAL FRONTAL SCALP
LOCATION DETAILED: RIGHT SUPERIOR PARIETAL SCALP
LOCATION DETAILED: RIGHT CENTRAL FRONTAL SCALP
LOCATION DETAILED: RIGHT SUPERIOR PARIETAL SCALP
LOCATION DETAILED: RIGHT CENTRAL FRONTAL SCALP

## 2020-06-25 NOTE — PROCEDURE: INTRALESIONAL KENALOG
Ndc# For Kenalog Only: [id number]
Administered By (Optional): Linda May
X Size Of Lesion In Cm (Optional): 0
Consent: The risks of atrophy were reviewed with the patient.
Medical Necessity Clause: This procedure was medically necessary because the lesions that were treated were:
Concentration Of Solution Injected (Mg/Ml): 10.0
Total Volume Injected (Ccs- Only Use Numbers And Decimals): 0.7
Kenalog Preparation: Kenalog
Detail Level: Detailed
Include Z78.9 (Other Specified Conditions Influencing Health Status) As An Associated Diagnosis?: No

## 2020-07-02 ENCOUNTER — APPOINTMENT (RX ONLY)
Dept: URBAN - METROPOLITAN AREA CLINIC 54 | Facility: CLINIC | Age: 25
Setting detail: DERMATOLOGY
End: 2020-07-02

## 2020-07-02 DIAGNOSIS — L66.89 OTHER CICATRICIAL ALOPECIA: ICD-10-CM

## 2020-07-02 DIAGNOSIS — L66.8 OTHER CICATRICIAL ALOPECIA: ICD-10-CM

## 2020-07-02 DIAGNOSIS — D22 MELANOCYTIC NEVI: ICD-10-CM

## 2020-07-02 PROBLEM — D22.9 MELANOCYTIC NEVI, UNSPECIFIED: Status: ACTIVE | Noted: 2020-07-02

## 2020-07-02 PROCEDURE — 99213 OFFICE O/P EST LOW 20 MIN: CPT | Mod: 24,25

## 2020-07-02 PROCEDURE — 11900 INJECT SKIN LESIONS </W 7: CPT | Mod: 79

## 2020-07-02 PROCEDURE — ? COUNSELING

## 2020-07-02 PROCEDURE — ? INTRALESIONAL KENALOG

## 2020-07-02 ASSESSMENT — LOCATION ZONE DERM
LOCATION ZONE: SCALP
LOCATION ZONE: SCALP

## 2020-07-02 ASSESSMENT — LOCATION SIMPLE DESCRIPTION DERM
LOCATION SIMPLE: SCALP
LOCATION SIMPLE: RIGHT SCALP
LOCATION SIMPLE: RIGHT SCALP

## 2020-07-02 ASSESSMENT — LOCATION DETAILED DESCRIPTION DERM
LOCATION DETAILED: RIGHT MEDIAL FRONTAL SCALP
LOCATION DETAILED: RIGHT SUPERIOR PARIETAL SCALP
LOCATION DETAILED: RIGHT CENTRAL FRONTAL SCALP
LOCATION DETAILED: RIGHT CENTRAL FRONTAL SCALP

## 2020-07-02 NOTE — PROCEDURE: INTRALESIONAL KENALOG
Lot # (Optional): [id number]
Ndc# For Kenalog Only: [id number]
Administered By (Optional): Magy Banda
Expiration Date (Optional): Jan 2022
X Size Of Lesion In Cm (Optional): 0
Consent: The risks of atrophy were reviewed with the patient.
Medical Necessity Clause: This procedure was medically necessary because the lesions that were treated were:
Concentration Of Solution Injected (Mg/Ml): 10.0
Total Volume Injected (Ccs- Only Use Numbers And Decimals): 0.7
Kenalog Preparation: Kenalog
Detail Level: Detailed
Include Z78.9 (Other Specified Conditions Influencing Health Status) As An Associated Diagnosis?: No

## 2020-07-10 ENCOUNTER — APPOINTMENT (RX ONLY)
Dept: URBAN - METROPOLITAN AREA CLINIC 54 | Facility: CLINIC | Age: 25
Setting detail: DERMATOLOGY
End: 2020-07-10

## 2020-07-10 DIAGNOSIS — L66.8 OTHER CICATRICIAL ALOPECIA: ICD-10-CM

## 2020-07-10 DIAGNOSIS — D22 MELANOCYTIC NEVI: ICD-10-CM

## 2020-07-10 DIAGNOSIS — L66.89 OTHER CICATRICIAL ALOPECIA: ICD-10-CM

## 2020-07-10 PROBLEM — D22.9 MELANOCYTIC NEVI, UNSPECIFIED: Status: ACTIVE | Noted: 2020-07-10

## 2020-07-10 PROCEDURE — 11900 INJECT SKIN LESIONS </W 7: CPT

## 2020-07-10 PROCEDURE — ? TREATMENT REGIMEN

## 2020-07-10 PROCEDURE — ? COUNSELING

## 2020-07-10 PROCEDURE — 99213 OFFICE O/P EST LOW 20 MIN: CPT | Mod: 25

## 2020-07-10 PROCEDURE — ? INTRALESIONAL KENALOG

## 2020-07-10 ASSESSMENT — LOCATION ZONE DERM
LOCATION ZONE: SCALP
LOCATION ZONE: SCALP

## 2020-07-10 ASSESSMENT — LOCATION DETAILED DESCRIPTION DERM
LOCATION DETAILED: RIGHT MEDIAL FRONTAL SCALP
LOCATION DETAILED: RIGHT CENTRAL FRONTAL SCALP
LOCATION DETAILED: RIGHT SUPERIOR PARIETAL SCALP
LOCATION DETAILED: LEFT CENTRAL OCCIPITAL SCALP
LOCATION DETAILED: RIGHT CENTRAL FRONTAL SCALP

## 2020-07-10 NOTE — PROCEDURE: INTRALESIONAL KENALOG
Lot # (Optional): [id number]
Ndc# For Kenalog Only: [id number]
Administered By (Optional): Paresh Blankenship
Expiration Date (Optional): Jan 2022
X Size Of Lesion In Cm (Optional): 0
Consent: The risks of atrophy were reviewed with the patient.
Medical Necessity Clause: This procedure was medically necessary because the lesions that were treated were:
Concentration Of Solution Injected (Mg/Ml): 10.0
Total Volume Injected (Ccs- Only Use Numbers And Decimals): 0.7
Kenalog Preparation: Kenalog
Detail Level: Detailed
Include Z78.9 (Other Specified Conditions Influencing Health Status) As An Associated Diagnosis?: No

## 2020-07-22 ENCOUNTER — APPOINTMENT (RX ONLY)
Dept: URBAN - METROPOLITAN AREA CLINIC 54 | Facility: CLINIC | Age: 25
Setting detail: DERMATOLOGY
End: 2020-07-22

## 2020-07-22 DIAGNOSIS — D22 MELANOCYTIC NEVI: ICD-10-CM

## 2020-07-22 DIAGNOSIS — L66.89 OTHER CICATRICIAL ALOPECIA: ICD-10-CM

## 2020-07-22 DIAGNOSIS — L66.8 OTHER CICATRICIAL ALOPECIA: ICD-10-CM

## 2020-07-22 PROBLEM — D22.9 MELANOCYTIC NEVI, UNSPECIFIED: Status: ACTIVE | Noted: 2020-07-22

## 2020-07-22 PROCEDURE — ? INTRALESIONAL KENALOG

## 2020-07-22 PROCEDURE — ? ORDER TESTS

## 2020-07-22 PROCEDURE — ? TREATMENT REGIMEN

## 2020-07-22 PROCEDURE — 11900 INJECT SKIN LESIONS </W 7: CPT

## 2020-07-22 PROCEDURE — ? COUNSELING

## 2020-07-22 PROCEDURE — ? PRESCRIPTION

## 2020-07-22 PROCEDURE — ? INCISION AND DRAINAGE

## 2020-07-22 PROCEDURE — 10061 I&D ABSCESS COMP/MULTIPLE: CPT

## 2020-07-22 PROCEDURE — 99213 OFFICE O/P EST LOW 20 MIN: CPT | Mod: 25

## 2020-07-22 RX ORDER — AMOXICILLIN AND CLAVULANATE POTASSIUM 875; 125 MG/1; MG/1
TABLET, FILM COATED ORAL BID
Qty: 20 | Refills: 0 | Status: ERX | COMMUNITY
Start: 2020-07-22

## 2020-07-22 RX ADMIN — AMOXICILLIN AND CLAVULANATE POTASSIUM: 875; 125 TABLET, FILM COATED ORAL at 00:00

## 2020-07-22 ASSESSMENT — LOCATION DETAILED DESCRIPTION DERM
LOCATION DETAILED: RIGHT SUPERIOR PARIETAL SCALP
LOCATION DETAILED: RIGHT SUPERIOR OCCIPITAL SCALP
LOCATION DETAILED: RIGHT MEDIAL FRONTAL SCALP
LOCATION DETAILED: LEFT CENTRAL OCCIPITAL SCALP
LOCATION DETAILED: RIGHT CENTRAL FRONTAL SCALP
LOCATION DETAILED: LEFT SUPERIOR OCCIPITAL SCALP
LOCATION DETAILED: LEFT SUPERIOR PARIETAL SCALP
LOCATION DETAILED: RIGHT SUPERIOR PARIETAL SCALP

## 2020-07-22 ASSESSMENT — LOCATION SIMPLE DESCRIPTION DERM
LOCATION SIMPLE: LEFT OCCIPITAL SCALP
LOCATION SIMPLE: RIGHT OCCIPITAL SCALP
LOCATION SIMPLE: RIGHT SCALP
LOCATION SIMPLE: SCALP
LOCATION SIMPLE: SCALP

## 2020-07-22 ASSESSMENT — LOCATION ZONE DERM
LOCATION ZONE: SCALP
LOCATION ZONE: SCALP

## 2020-07-22 NOTE — PROCEDURE: TREATMENT REGIMEN
Discontinue Regimen: Doxycycline 100mg twice daily.
Initiate Treatment: Amoxicillin 875mg twice daily for 10 days.
Detail Level: Zone

## 2020-07-22 NOTE — PROCEDURE: INCISION AND DRAINAGE
Body Location Override (Optional - Billing Will Still Be Based On Selected Body Map Location If Applicable): SCALP
Detail Level: Detailed
Lesion Type: Abscesses
I&D: multiple
Method: sterile needle
Curette: No
Anesthesia Type: 1% lidocaine with epinephrine
Anesthesia Volume In Cc: 6
Size Of Lesion In Cm (Optional But May Be Required For Some Insurances): 0
Wound Care: Bacitracin
Dressing: dry sterile dressing
Curette Text (Optional): After the contents were expressed a curette was used to partially remove the cyst wall.
Epidermal Sutures: 4-0 Nylon
Epidermal Closure: simple interrupted
Consent was obtained and risks were reviewed including but not limited to delayed wound healing, infection, need for multiple I and D's, and pain.
Post-Care Instructions: I reviewed with the patient in detail post-care instructions. Patient should keep wound covered and call the office should any redness, pain, swelling or worsening occur.

## 2020-07-22 NOTE — PROCEDURE: INTRALESIONAL KENALOG
Lot # (Optional): [id number]
Ndc# For Kenalog Only: [id number]
Administered By (Optional): Minna Cason
Expiration Date (Optional): Jan 2022
X Size Of Lesion In Cm (Optional): 0
Consent: The risks of atrophy were reviewed with the patient.
Medical Necessity Clause: This procedure was medically necessary because the lesions that were treated were:
Concentration Of Solution Injected (Mg/Ml): 10.0
Total Volume Injected (Ccs- Only Use Numbers And Decimals): 0.7
Kenalog Preparation: Kenalog
Detail Level: Detailed
Include Z78.9 (Other Specified Conditions Influencing Health Status) As An Associated Diagnosis?: No

## 2020-08-04 ENCOUNTER — APPOINTMENT (RX ONLY)
Dept: URBAN - METROPOLITAN AREA CLINIC 54 | Facility: CLINIC | Age: 25
Setting detail: DERMATOLOGY
End: 2020-08-04

## 2020-08-04 DIAGNOSIS — L66.89 OTHER CICATRICIAL ALOPECIA: ICD-10-CM

## 2020-08-04 DIAGNOSIS — D22 MELANOCYTIC NEVI: ICD-10-CM

## 2020-08-04 DIAGNOSIS — L66.8 OTHER CICATRICIAL ALOPECIA: ICD-10-CM

## 2020-08-04 PROBLEM — D22.9 MELANOCYTIC NEVI, UNSPECIFIED: Status: ACTIVE | Noted: 2020-08-04

## 2020-08-04 PROCEDURE — 99213 OFFICE O/P EST LOW 20 MIN: CPT | Mod: 25

## 2020-08-04 PROCEDURE — ? TREATMENT REGIMEN

## 2020-08-04 PROCEDURE — 11900 INJECT SKIN LESIONS </W 7: CPT

## 2020-08-04 PROCEDURE — ? COUNSELING

## 2020-08-04 PROCEDURE — ? INTRALESIONAL KENALOG

## 2020-08-04 PROCEDURE — ? OTHER

## 2020-08-04 ASSESSMENT — LOCATION SIMPLE DESCRIPTION DERM
LOCATION SIMPLE: SCALP
LOCATION SIMPLE: RIGHT OCCIPITAL SCALP
LOCATION SIMPLE: LEFT OCCIPITAL SCALP
LOCATION SIMPLE: RIGHT SCALP
LOCATION SIMPLE: SCALP

## 2020-08-04 ASSESSMENT — LOCATION ZONE DERM
LOCATION ZONE: SCALP
LOCATION ZONE: SCALP

## 2020-08-04 ASSESSMENT — LOCATION DETAILED DESCRIPTION DERM
LOCATION DETAILED: RIGHT SUPERIOR PARIETAL SCALP
LOCATION DETAILED: LEFT SUPERIOR OCCIPITAL SCALP
LOCATION DETAILED: LEFT SUPERIOR PARIETAL SCALP
LOCATION DETAILED: RIGHT SUPERIOR OCCIPITAL SCALP
LOCATION DETAILED: RIGHT CENTRAL FRONTAL SCALP
LOCATION DETAILED: RIGHT SUPERIOR PARIETAL SCALP
LOCATION DETAILED: RIGHT MEDIAL FRONTAL SCALP
LOCATION DETAILED: LEFT CENTRAL OCCIPITAL SCALP

## 2020-08-04 NOTE — PROCEDURE: TREATMENT REGIMEN
Continue Regimen: Doxycycline 100mg twice daily.
Discontinue Regimen: Amoxicillin 875mg twice daily for 10 days.
Detail Level: Zone

## 2020-08-04 NOTE — PROCEDURE: INTRALESIONAL KENALOG
Lot # (Optional): [id number]
Ndc# For Kenalog Only: [id number]
Administered By (Optional): Shannon Lacy
Expiration Date (Optional): Jan 2022
X Size Of Lesion In Cm (Optional): 0
Consent: The risks of atrophy were reviewed with the patient.
Medical Necessity Clause: This procedure was medically necessary because the lesions that were treated were:
Concentration Of Solution Injected (Mg/Ml): 10.0
Total Volume Injected (Ccs- Only Use Numbers And Decimals): 0.7
Kenalog Preparation: Kenalog
Detail Level: Detailed
Include Z78.9 (Other Specified Conditions Influencing Health Status) As An Associated Diagnosis?: No

## 2020-08-04 NOTE — PROCEDURE: OTHER
Detail Level: Zone
Other (Free Text): Swab culture on scalp did not show any bacterial growth.
Note Text (......Xxx Chief Complaint.): This diagnosis correlates with the

## 2020-08-25 ENCOUNTER — APPOINTMENT (RX ONLY)
Dept: URBAN - METROPOLITAN AREA CLINIC 54 | Facility: CLINIC | Age: 25
Setting detail: DERMATOLOGY
End: 2020-08-25

## 2020-08-25 DIAGNOSIS — D22 MELANOCYTIC NEVI: ICD-10-CM

## 2020-08-25 DIAGNOSIS — L66.8 OTHER CICATRICIAL ALOPECIA: ICD-10-CM | Status: WORSENING

## 2020-08-25 DIAGNOSIS — L66.89 OTHER CICATRICIAL ALOPECIA: ICD-10-CM | Status: WORSENING

## 2020-08-25 PROBLEM — D22.9 MELANOCYTIC NEVI, UNSPECIFIED: Status: ACTIVE | Noted: 2020-08-25

## 2020-08-25 PROCEDURE — 99213 OFFICE O/P EST LOW 20 MIN: CPT | Mod: 25

## 2020-08-25 PROCEDURE — ? COUNSELING

## 2020-08-25 PROCEDURE — ? TREATMENT REGIMEN

## 2020-08-25 PROCEDURE — 11900 INJECT SKIN LESIONS </W 7: CPT

## 2020-08-25 PROCEDURE — ? INTRALESIONAL KENALOG

## 2020-08-25 ASSESSMENT — LOCATION DETAILED DESCRIPTION DERM
LOCATION DETAILED: RIGHT MEDIAL FRONTAL SCALP
LOCATION DETAILED: LEFT SUPERIOR OCCIPITAL SCALP
LOCATION DETAILED: RIGHT SUPERIOR OCCIPITAL SCALP
LOCATION DETAILED: RIGHT SUPERIOR PARIETAL SCALP
LOCATION DETAILED: LEFT CENTRAL OCCIPITAL SCALP
LOCATION DETAILED: LEFT SUPERIOR PARIETAL SCALP
LOCATION DETAILED: RIGHT SUPERIOR PARIETAL SCALP
LOCATION DETAILED: RIGHT CENTRAL FRONTAL SCALP

## 2020-08-25 ASSESSMENT — LOCATION ZONE DERM
LOCATION ZONE: SCALP
LOCATION ZONE: SCALP

## 2020-08-25 ASSESSMENT — LOCATION SIMPLE DESCRIPTION DERM
LOCATION SIMPLE: SCALP
LOCATION SIMPLE: RIGHT SCALP
LOCATION SIMPLE: LEFT OCCIPITAL SCALP
LOCATION SIMPLE: RIGHT OCCIPITAL SCALP
LOCATION SIMPLE: SCALP

## 2020-08-25 NOTE — PROCEDURE: INTRALESIONAL KENALOG
Lot # (Optional): [id number]
Ndc# For Kenalog Only: [id number]
Administered By (Optional): Marlena Hanna
Expiration Date (Optional): Jan 2022
X Size Of Lesion In Cm (Optional): 0
Consent: The risks of atrophy were reviewed with the patient.
Medical Necessity Clause: This procedure was medically necessary because the lesions that were treated were:
Concentration Of Solution Injected (Mg/Ml): 10.0
Total Volume Injected (Ccs- Only Use Numbers And Decimals): 0.7
Kenalog Preparation: Kenalog
Detail Level: Detailed
Include Z78.9 (Other Specified Conditions Influencing Health Status) As An Associated Diagnosis?: No

## 2020-08-25 NOTE — PROCEDURE: TREATMENT REGIMEN
Continue Regimen: Doxycycline 100mg tablet take one by mouth twice daily 30 mins after meal
Detail Level: Zone

## 2020-09-08 ENCOUNTER — APPOINTMENT (RX ONLY)
Dept: URBAN - METROPOLITAN AREA CLINIC 54 | Facility: CLINIC | Age: 25
Setting detail: DERMATOLOGY
End: 2020-09-08

## 2020-09-08 ENCOUNTER — RX ONLY (OUTPATIENT)
Age: 25
Setting detail: RX ONLY
End: 2020-09-08

## 2020-09-08 DIAGNOSIS — L66.89 OTHER CICATRICIAL ALOPECIA: ICD-10-CM | Status: UNCHANGED

## 2020-09-08 DIAGNOSIS — D22 MELANOCYTIC NEVI: ICD-10-CM

## 2020-09-08 DIAGNOSIS — L66.8 OTHER CICATRICIAL ALOPECIA: ICD-10-CM | Status: UNCHANGED

## 2020-09-08 PROBLEM — D22.9 MELANOCYTIC NEVI, UNSPECIFIED: Status: ACTIVE | Noted: 2020-09-08

## 2020-09-08 PROCEDURE — 99213 OFFICE O/P EST LOW 20 MIN: CPT | Mod: 25

## 2020-09-08 PROCEDURE — ? COUNSELING

## 2020-09-08 PROCEDURE — ? INTRALESIONAL KENALOG

## 2020-09-08 PROCEDURE — ? TREATMENT REGIMEN

## 2020-09-08 PROCEDURE — 11900 INJECT SKIN LESIONS </W 7: CPT

## 2020-09-08 RX ORDER — PREDNISONE 20 MG/1
TABLET ORAL BID
Qty: 8 | Refills: 0

## 2020-09-08 RX ORDER — DOXYCYCLINE 100 MG/1
CAPSULE ORAL BID
Qty: 60 | Refills: 0

## 2020-09-08 ASSESSMENT — LOCATION DETAILED DESCRIPTION DERM
LOCATION DETAILED: RIGHT CENTRAL PARIETAL SCALP
LOCATION DETAILED: RIGHT SUPERIOR PARIETAL SCALP
LOCATION DETAILED: LEFT SUPERIOR OCCIPITAL SCALP
LOCATION DETAILED: LEFT CENTRAL PARIETAL SCALP
LOCATION DETAILED: LEFT SUPERIOR PARIETAL SCALP
LOCATION DETAILED: RIGHT SUPERIOR OCCIPITAL SCALP

## 2020-09-08 ASSESSMENT — LOCATION ZONE DERM: LOCATION ZONE: SCALP

## 2020-09-08 ASSESSMENT — LOCATION SIMPLE DESCRIPTION DERM
LOCATION SIMPLE: RIGHT OCCIPITAL SCALP
LOCATION SIMPLE: LEFT OCCIPITAL SCALP
LOCATION SIMPLE: SCALP

## 2020-09-08 NOTE — PROCEDURE: INTRALESIONAL KENALOG
Medical Necessity Clause: This procedure was medically necessary because the lesions that were treated were:
Consent: The risks of atrophy were reviewed with the patient.
Concentration Of Solution Injected (Mg/Ml): 10.0
Detail Level: Detailed
Expiration Date (Optional): September 2021
Ndc# For Kenalog Only: [id number]
Kenalog Preparation: Kenalog
X Size Of Lesion In Cm (Optional): 0
Administered By (Optional): Liyah Gan
Include Z78.9 (Other Specified Conditions Influencing Health Status) As An Associated Diagnosis?: No
Total Volume Injected (Ccs- Only Use Numbers And Decimals): 0.8
Lot # (Optional): [id number]

## 2020-09-08 NOTE — PROCEDURE: MIPS QUALITY
Detail Level: Detailed
Quality 130: Documentation Of Current Medications In The Medical Record: Current Medications Documented
Quality 431: Preventive Care And Screening: Unhealthy Alcohol Use - Screening: Unhealthy alcohol use screening not performed, reason not otherwise specified
Quality 110: Preventive Care And Screening: Influenza Immunization: Influenza Immunization Administered during Influenza season
Quality 226: Preventive Care And Screening: Tobacco Use: Screening And Cessation Intervention: Tobacco Screening not Performed for Unknown Reasons

## 2020-09-16 NOTE — PROCEDURE: MIPS QUALITY
Quality 130: Documentation Of Current Medications In The Medical Record: Current Medications Documented
Quality 226: Preventive Care And Screening: Tobacco Use: Screening And Cessation Intervention: Patient screened for tobacco and never smoked
Quality 431: Preventive Care And Screening: Unhealthy Alcohol Use - Screening: Patient screened for unhealthy alcohol use using a single question and scores less than 2 times per year
Detail Level: Detailed
none

## 2020-09-22 ENCOUNTER — APPOINTMENT (RX ONLY)
Dept: URBAN - METROPOLITAN AREA CLINIC 54 | Facility: CLINIC | Age: 25
Setting detail: DERMATOLOGY
End: 2020-09-22

## 2020-09-22 DIAGNOSIS — D22 MELANOCYTIC NEVI: ICD-10-CM

## 2020-09-22 DIAGNOSIS — L66.89 OTHER CICATRICIAL ALOPECIA: ICD-10-CM | Status: UNCHANGED

## 2020-09-22 DIAGNOSIS — L66.8 OTHER CICATRICIAL ALOPECIA: ICD-10-CM | Status: UNCHANGED

## 2020-09-22 PROBLEM — D22.9 MELANOCYTIC NEVI, UNSPECIFIED: Status: ACTIVE | Noted: 2020-09-22

## 2020-09-22 PROCEDURE — ? TREATMENT REGIMEN

## 2020-09-22 PROCEDURE — ? RECOMMENDATIONS

## 2020-09-22 PROCEDURE — ? COUNSELING

## 2020-09-22 PROCEDURE — 11900 INJECT SKIN LESIONS </W 7: CPT

## 2020-09-22 PROCEDURE — 99213 OFFICE O/P EST LOW 20 MIN: CPT | Mod: 25

## 2020-09-22 PROCEDURE — ? INTRALESIONAL KENALOG

## 2020-09-22 PROCEDURE — ? OTHER

## 2020-09-22 ASSESSMENT — LOCATION DETAILED DESCRIPTION DERM
LOCATION DETAILED: RIGHT SUPERIOR PARIETAL SCALP
LOCATION DETAILED: LEFT OCCIPITAL SCALP
LOCATION DETAILED: LEFT SUPERIOR OCCIPITAL SCALP
LOCATION DETAILED: LEFT SUPERIOR PARIETAL SCALP
LOCATION DETAILED: RIGHT CENTRAL PARIETAL SCALP
LOCATION DETAILED: RIGHT SUPERIOR OCCIPITAL SCALP

## 2020-09-22 ASSESSMENT — LOCATION SIMPLE DESCRIPTION DERM
LOCATION SIMPLE: SCALP
LOCATION SIMPLE: POSTERIOR SCALP
LOCATION SIMPLE: RIGHT OCCIPITAL SCALP
LOCATION SIMPLE: LEFT OCCIPITAL SCALP

## 2020-09-22 ASSESSMENT — LOCATION ZONE DERM: LOCATION ZONE: SCALP

## 2020-09-22 NOTE — PROCEDURE: TREATMENT REGIMEN
Continue Regimen: Doxycycline 100 mg tablet twice a day 30 minutes after meal
Plan: Finished Prednisone 20mg taper\\nConsider Humira injection next visit
Detail Level: Zone

## 2020-09-22 NOTE — PROCEDURE: OTHER
Detail Level: Zone
Note Text (......Xxx Chief Complaint.): This diagnosis correlates with the
Other (Free Text): Patient reports stomach upsets after spicy food consumption (r/o peptic ulcer) and NO TB exposure

## 2020-09-22 NOTE — PROCEDURE: INTRALESIONAL KENALOG
Medical Necessity Clause: This procedure was medically necessary because the lesions that were treated were:
Expiration Date (Optional): Mar 2022
Kenalog Preparation: Kenalog
Detail Level: Detailed
Administered By (Optional): Dianna Mccurdy
Include Z78.9 (Other Specified Conditions Influencing Health Status) As An Associated Diagnosis?: No
Concentration Of Solution Injected (Mg/Ml): 10.0
Ndc# For Kenalog Only: [id number]
Total Volume Injected (Ccs- Only Use Numbers And Decimals): 0.5
Lot # (Optional): [id number]
Consent: The risks of atrophy were reviewed with the patient.
X Size Of Lesion In Cm (Optional): 0

## 2020-10-21 ENCOUNTER — RX ONLY (OUTPATIENT)
Age: 25
Setting detail: RX ONLY
End: 2020-10-21

## 2020-10-21 RX ORDER — DOXYCYCLINE 100 MG/1
CAPSULE ORAL BID
Qty: 60 | Refills: 0 | Status: ERX

## 2020-10-22 ENCOUNTER — APPOINTMENT (RX ONLY)
Dept: URBAN - METROPOLITAN AREA CLINIC 54 | Facility: CLINIC | Age: 25
Setting detail: DERMATOLOGY
End: 2020-10-22

## 2020-10-22 DIAGNOSIS — L66.8 OTHER CICATRICIAL ALOPECIA: ICD-10-CM | Status: WELL CONTROLLED

## 2020-10-22 DIAGNOSIS — L66.89 OTHER CICATRICIAL ALOPECIA: ICD-10-CM | Status: WELL CONTROLLED

## 2020-10-22 PROCEDURE — ? TREATMENT REGIMEN

## 2020-10-22 PROCEDURE — ? COUNSELING

## 2020-10-22 PROCEDURE — 99213 OFFICE O/P EST LOW 20 MIN: CPT

## 2020-10-22 ASSESSMENT — LOCATION DETAILED DESCRIPTION DERM
LOCATION DETAILED: LEFT CENTRAL PARIETAL SCALP
LOCATION DETAILED: LEFT CENTRAL FRONTAL SCALP

## 2020-10-22 ASSESSMENT — LOCATION SIMPLE DESCRIPTION DERM
LOCATION SIMPLE: SCALP
LOCATION SIMPLE: LEFT SCALP

## 2020-10-22 ASSESSMENT — LOCATION ZONE DERM: LOCATION ZONE: SCALP

## 2020-11-20 ENCOUNTER — APPOINTMENT (RX ONLY)
Dept: URBAN - METROPOLITAN AREA CLINIC 54 | Facility: CLINIC | Age: 25
Setting detail: DERMATOLOGY
End: 2020-11-20

## 2020-11-20 ENCOUNTER — RX ONLY (OUTPATIENT)
Age: 25
Setting detail: RX ONLY
End: 2020-11-20

## 2020-11-20 DIAGNOSIS — L66.89 OTHER CICATRICIAL ALOPECIA: ICD-10-CM | Status: IMPROVED

## 2020-11-20 DIAGNOSIS — L85.3 XEROSIS CUTIS: ICD-10-CM

## 2020-11-20 DIAGNOSIS — L66.8 OTHER CICATRICIAL ALOPECIA: ICD-10-CM | Status: IMPROVED

## 2020-11-20 PROCEDURE — 11900 INJECT SKIN LESIONS </W 7: CPT

## 2020-11-20 PROCEDURE — ? TREATMENT REGIMEN

## 2020-11-20 PROCEDURE — 99213 OFFICE O/P EST LOW 20 MIN: CPT | Mod: 25

## 2020-11-20 PROCEDURE — ? COUNSELING

## 2020-11-20 PROCEDURE — ? DEFER

## 2020-11-20 PROCEDURE — ? INTRALESIONAL KENALOG

## 2020-11-20 RX ORDER — DOXYCYCLINE 100 MG/1
CAPSULE ORAL BID
Qty: 60 | Refills: 0 | Status: ERX

## 2020-11-20 RX ORDER — TRETIONIN 1 MG/G
CREAM TOPICAL
Qty: 1 | Refills: 1 | Status: ERX | COMMUNITY
Start: 2020-11-20

## 2020-11-20 ASSESSMENT — LOCATION DETAILED DESCRIPTION DERM
LOCATION DETAILED: LEFT SUPERIOR PARIETAL SCALP
LOCATION DETAILED: POSTERIOR MID-PARIETAL SCALP
LOCATION DETAILED: LEFT CENTRAL PARIETAL SCALP
LOCATION DETAILED: LEFT CENTRAL FRONTAL SCALP
LOCATION DETAILED: LEFT SUPERIOR PARIETAL SCALP
LOCATION DETAILED: LEFT CENTRAL PARIETAL SCALP

## 2020-11-20 ASSESSMENT — LOCATION SIMPLE DESCRIPTION DERM
LOCATION SIMPLE: POSTERIOR SCALP
LOCATION SIMPLE: SCALP
LOCATION SIMPLE: LEFT SCALP
LOCATION SIMPLE: SCALP

## 2020-11-20 ASSESSMENT — LOCATION ZONE DERM
LOCATION ZONE: SCALP
LOCATION ZONE: SCALP

## 2020-11-20 NOTE — PROCEDURE: INTRALESIONAL KENALOG
Total Volume Injected (Ccs- Only Use Numbers And Decimals): 0.5
Medical Necessity Clause: This procedure was medically necessary because the lesions that were treated were:
Expiration Date (Optional): MAR 2022
Concentration Of Solution Injected (Mg/Ml): 10.0
Lot # (Optional): [id number]
X Size Of Lesion In Cm (Optional): 0
Detail Level: Detailed
Ndc# For Kenalog Only: 2411-4399-6
Administered By (Optional): Paresh Blankenship
Consent: The risks of atrophy were reviewed with the patient.
Kenalog Preparation: Kenalog
Include Z78.9 (Other Specified Conditions Influencing Health Status) As An Associated Diagnosis?: No

## 2021-01-06 ENCOUNTER — RX ONLY (OUTPATIENT)
Age: 26
Setting detail: RX ONLY
End: 2021-01-06

## 2021-01-06 RX ORDER — DOXYCYCLINE 100 MG/1
CAPSULE ORAL BID
Qty: 60 | Refills: 0 | Status: ERX

## 2021-06-08 ENCOUNTER — APPOINTMENT (RX ONLY)
Dept: URBAN - METROPOLITAN AREA CLINIC 54 | Facility: CLINIC | Age: 26
Setting detail: DERMATOLOGY
End: 2021-06-08

## 2021-06-08 ENCOUNTER — RX ONLY (OUTPATIENT)
Age: 26
Setting detail: RX ONLY
End: 2021-06-08

## 2021-06-08 DIAGNOSIS — L66.89 OTHER CICATRICIAL ALOPECIA: ICD-10-CM

## 2021-06-08 DIAGNOSIS — L66.8 OTHER CICATRICIAL ALOPECIA: ICD-10-CM

## 2021-06-08 DIAGNOSIS — L70.0 ACNE VULGARIS: ICD-10-CM

## 2021-06-08 PROCEDURE — 11900 INJECT SKIN LESIONS </W 7: CPT

## 2021-06-08 PROCEDURE — ? TREATMENT REGIMEN

## 2021-06-08 PROCEDURE — ? PRESCRIPTION

## 2021-06-08 PROCEDURE — ? COUNSELING

## 2021-06-08 PROCEDURE — ? INTRALESIONAL KENALOG

## 2021-06-08 PROCEDURE — 99213 OFFICE O/P EST LOW 20 MIN: CPT | Mod: 25

## 2021-06-08 RX ORDER — DAPSONE 50 MG/G
GEL TOPICAL
Qty: 1 | Refills: 2 | Status: CANCELLED
Stop reason: ENTERED-IN-ERROR

## 2021-06-08 RX ORDER — SULFAMETHOXAZOLE AND TRIMETHOPRIM 400; 80 MG/1; MG/1
TABLET ORAL
Qty: 60 | Refills: 0 | Status: CANCELLED
Stop reason: ENTERED-IN-ERROR

## 2021-06-08 RX ORDER — BENZOYL PEROXIDE 100 MG/ML
LIQUID TOPICAL BID
Qty: 1 | Refills: 1 | Status: ERX | COMMUNITY
Start: 2021-06-08

## 2021-06-08 RX ORDER — DAPSONE 50 MG/G
GEL TOPICAL
Qty: 1 | Refills: 3 | Status: ERX | COMMUNITY
Start: 2021-06-08

## 2021-06-08 RX ORDER — BENZOYL PEROXIDE 10 G/100G
SUSPENSION TOPICAL
Qty: 1 | Refills: 2 | Status: CANCELLED
Stop reason: ENTERED-IN-ERROR

## 2021-06-08 RX ORDER — SULFAMETHOXAZOLE AND TRIMETHOPRIM 400; 80 MG/1; MG/1
TABLET ORAL
Qty: 60 | Refills: 0 | Status: ERX | COMMUNITY
Start: 2021-06-08

## 2021-06-08 RX ADMIN — SULFAMETHOXAZOLE AND TRIMETHOPRIM: 400; 80 TABLET ORAL at 00:00

## 2021-06-08 RX ADMIN — DAPSONE: 50 GEL TOPICAL at 00:00

## 2021-06-08 RX ADMIN — BENZOYL PEROXIDE: 100 LIQUID TOPICAL at 00:00

## 2021-06-08 ASSESSMENT — LOCATION DETAILED DESCRIPTION DERM
LOCATION DETAILED: RIGHT SUPERIOR OCCIPITAL SCALP
LOCATION DETAILED: MID-OCCIPITAL SCALP
LOCATION DETAILED: LEFT CENTRAL PARIETAL SCALP
LOCATION DETAILED: LEFT CENTRAL FRONTAL SCALP
LOCATION DETAILED: LEFT CENTRAL MALAR CHEEK
LOCATION DETAILED: RIGHT INFERIOR CENTRAL MALAR CHEEK
LOCATION DETAILED: RIGHT INFERIOR MEDIAL BUCCAL CHEEK
LOCATION DETAILED: RIGHT CHIN
LOCATION DETAILED: LEFT SUPERIOR LATERAL BUCCAL CHEEK
LOCATION DETAILED: LEFT SUPERIOR OCCIPITAL SCALP
LOCATION DETAILED: LEFT INFERIOR CENTRAL MALAR CHEEK
LOCATION DETAILED: LEFT SUPERIOR PARIETAL SCALP
LOCATION DETAILED: LEFT SUPERIOR PARIETAL SCALP

## 2021-06-08 ASSESSMENT — LOCATION ZONE DERM
LOCATION ZONE: FACE
LOCATION ZONE: SCALP
LOCATION ZONE: SCALP

## 2021-06-08 ASSESSMENT — LOCATION SIMPLE DESCRIPTION DERM
LOCATION SIMPLE: RIGHT OCCIPITAL SCALP
LOCATION SIMPLE: CHIN
LOCATION SIMPLE: SCALP
LOCATION SIMPLE: LEFT OCCIPITAL SCALP
LOCATION SIMPLE: SCALP
LOCATION SIMPLE: LEFT CHEEK
LOCATION SIMPLE: RIGHT CHEEK
LOCATION SIMPLE: POSTERIOR SCALP
LOCATION SIMPLE: LEFT SCALP

## 2021-06-08 NOTE — PROCEDURE: COUNSELING
Detail Level: Detailed
High Dose Vitamin A Pregnancy And Lactation Text: High dose vitamin A therapy is contraindicated during pregnancy and breast feeding.
Benzoyl Peroxide Counseling: Patient counseled that medicine may cause skin irritation and bleach clothing. In the event of skin irritation, the patient was advised to reduce the amount of the drug applied or use it less frequently. The patient verbalized understanding of the proper use and possible adverse effects of benzoyl peroxide. All of the patient's questions and concerns were addressed.
Topical Sulfur Applications Pregnancy And Lactation Text: This medication is Pregnancy Category C and has an unknown safety profile during pregnancy. It is unknown if this topical medication is excreted in breast milk.
Dapsone Counseling: I discussed with the patient the risks of dapsone including but not limited to hemolytic anemia, agranulocytosis, rashes, methemoglobinemia, kidney failure, peripheral neuropathy, headaches, GI upset, and liver toxicity. Patients who start dapsone require monitoring including baseline LFTs and weekly CBCs for the first month, then every month thereafter. The patient verbalized understanding of the proper use and possible adverse effects of dapsone. All of the patient's questions and concerns were addressed.
Bactrim Counseling:  I discussed with the patient the risks of sulfa antibiotics including but not limited to GI upset, allergic reaction, drug rash, diarrhea, dizziness, photosensitivity, and yeast infections. Rarely, more serious reactions can occur including but not limited to aplastic anemia, agranulocytosis, methemoglobinemia, blood dyscrasias, liver or kidney failure, lung infiltrates or desquamative/blistering drug rashes.
Spironolactone Counseling: Patient advised regarding risks of diarrhea, abdominal pain, hyperkalemia, birth defects (for female patients), liver toxicity and renal toxicity. The patient may need blood work to monitor liver and kidney function and potassium levels while on therapy. The patient verbalized understanding of the proper use and possible adverse effects of spironolactone. All of the patient's questions and concerns were addressed.
Tazorac Pregnancy And Lactation Text: This medication is not safe during pregnancy. It is unknown if this medication is excreted in breast milk.
Erythromycin Pregnancy And Lactation Text: This medication is Pregnancy Category B and is considered safe during pregnancy. It is also excreted in breast milk.
Benzoyl Peroxide Pregnancy And Lactation Text: This medication is Pregnancy Category C. It is unknown if benzoyl peroxide is excreted in breast milk.
Dapsone Pregnancy And Lactation Text: This medication is Pregnancy Category C and is not considered safe during pregnancy or breast feeding.
Minocycline Counseling: Patient advised regarding possible photosensitivity and discoloration of the teeth, skin, lips, tongue and gums. Patient instructed to avoid sunlight, if possible. When exposed to sunlight, patients should wear protective clothing, sunglasses, and sunscreen. The patient was instructed to call the office immediately if the following severe adverse effects occur:  hearing changes, easy bruising/bleeding, severe headache, or vision changes. The patient verbalized understanding of the proper use and possible adverse effects of minocycline. All of the patient's questions and concerns were addressed.
Topical Retinoid counseling:  Patient advised to apply a pea-sized amount only at bedtime and wait 30 minutes after washing their face before applying. If too drying, patient may add a non-comedogenic moisturizer. The patient verbalized understanding of the proper use and possible adverse effects of retinoids. All of the patient's questions and concerns were addressed.
Isotretinoin Counseling: Patient should get monthly blood tests, not donate blood, not drive at night if vision affected, not share medication, and not undergo elective surgery for 6 months after tx completed. Side effects reviewed, pt to contact office should one occur.
Topical Clindamycin Counseling: Patient counseled that this medication may cause skin irritation or allergic reactions. In the event of skin irritation, the patient was advised to reduce the amount of the drug applied or use it less frequently. The patient verbalized understanding of the proper use and possible adverse effects of clindamycin. All of the patient's questions and concerns were addressed.
Spironolactone Pregnancy And Lactation Text: This medication can cause feminization of the male fetus and should be avoided during pregnancy. The active metabolite is also found in breast milk.
Bactrim Pregnancy And Lactation Text: This medication is Pregnancy Category D and is known to cause fetal risk. It is also excreted in breast milk.
Minocycline Pregnancy And Lactation Text: This medication is Pregnancy Category D and not consider safe during pregnancy. It is also excreted in breast milk.
Use Enhanced Medication Counseling?: No
Doxycycline Counseling:  Patient counseled regarding possible photosensitivity and increased risk for sunburn. Patient instructed to avoid sunlight, if possible. When exposed to sunlight, patients should wear protective clothing, sunglasses, and sunscreen. The patient was instructed to call the office immediately if the following severe adverse effects occur:  hearing changes, easy bruising/bleeding, severe headache, or vision changes. The patient verbalized understanding of the proper use and possible adverse effects of doxycycline. All of the patient's questions and concerns were addressed.
Tetracycline Counseling: Patient counseled regarding possible photosensitivity and increased risk for sunburn. Patient instructed to avoid sunlight, if possible. When exposed to sunlight, patients should wear protective clothing, sunglasses, and sunscreen. The patient was instructed to call the office immediately if the following severe adverse effects occur:  hearing changes, easy bruising/bleeding, severe headache, or vision changes. The patient verbalized understanding of the proper use and possible adverse effects of tetracycline. All of the patient's questions and concerns were addressed. Patient understands to avoid pregnancy while on therapy due to potential birth defects.
Topical Clindamycin Pregnancy And Lactation Text: This medication is Pregnancy Category B and is considered safe during pregnancy. It is unknown if it is excreted in breast milk.
Sarecycline Counseling: Patient advised regarding possible photosensitivity and discoloration of the teeth, skin, lips, tongue and gums. Patient instructed to avoid sunlight, if possible. When exposed to sunlight, patients should wear protective clothing, sunglasses, and sunscreen. The patient was instructed to call the office immediately if the following severe adverse effects occur:  hearing changes, easy bruising/bleeding, severe headache, or vision changes. The patient verbalized understanding of the proper use and possible adverse effects of sarecycline. All of the patient's questions and concerns were addressed.
Birth Control Pills Counseling: Birth Control Pill Counseling: I discussed with the patient the potential side effects of OCPs including but not limited to increased risk of stroke, heart attack, thrombophlebitis, deep venous thrombosis, hepatic adenomas, breast changes, GI upset, headaches, and depression. The patient verbalized understanding of the proper use and possible adverse effects of OCPs. All of the patient's questions and concerns were addressed.
Isotretinoin Pregnancy And Lactation Text: This medication is Pregnancy Category X and is considered extremely dangerous during pregnancy. It is unknown if it is excreted in breast milk.
Topical Retinoid Pregnancy And Lactation Text: This medication is Pregnancy Category C. It is unknown if this medication is excreted in breast milk.
Doxycycline Pregnancy And Lactation Text: This medication is Pregnancy Category D and not consider safe during pregnancy. It is also excreted in breast milk but is considered safe for shorter treatment courses.
Topical Sulfur Applications Counseling: Topical Sulfur Counseling: Patient counseled that this medication may cause skin irritation or allergic reactions. In the event of skin irritation, the patient was advised to reduce the amount of the drug applied or use it less frequently. The patient verbalized understanding of the proper use and possible adverse effects of topical sulfur application. All of the patient's questions and concerns were addressed.
Azithromycin Counseling:  I discussed with the patient the risks of azithromycin including but not limited to GI upset, allergic reaction, drug rash, diarrhea, and yeast infections.
High Dose Vitamin A Counseling: Side effects reviewed, pt to contact office should one occur.
Erythromycin Counseling:  I discussed with the patient the risks of erythromycin including but not limited to GI upset, allergic reaction, drug rash, diarrhea, increase in liver enzymes, and yeast infections.
Birth Control Pills Pregnancy And Lactation Text: This medication should be avoided if pregnant and for the first 30 days post-partum.
Tazorac Counseling:  Patient advised that medication is irritating and drying. Patient may need to apply sparingly and wash off after an hour before eventually leaving it on overnight. The patient verbalized understanding of the proper use and possible adverse effects of tazorac. All of the patient's questions and concerns were addressed.
Azithromycin Pregnancy And Lactation Text: This medication is considered safe during pregnancy and is also secreted in breast milk.

## 2021-06-08 NOTE — PROCEDURE: INTRALESIONAL KENALOG
X Size Of Lesion In Cm (Optional): 0
Validate Note Data When Using Inventory: Yes
Administered By (Optional): Stoney Henry
Expiration Date (Optional): SEP 2022
Medical Necessity Clause: This procedure was medically necessary because the lesions that were treated were:
Total Volume Injected (Ccs- Only Use Numbers And Decimals): 1
Kenalog Preparation: Kenalog
Concentration Of Solution Injected (Mg/Ml): 10.0
Ndc# For Kenalog Only: 2660-3873-44
Include Z78.9 (Other Specified Conditions Influencing Health Status) As An Associated Diagnosis?: No
Lot # (Optional): [id number]
Consent: The risks of atrophy were reviewed with the patient.
Detail Level: Detailed

## 2021-06-08 NOTE — PROCEDURE: TREATMENT REGIMEN
Detail Level: Zone
Discontinue Regimen: Doxycycline 100 mg tablet twice a day 30 minutes after meal
Initiate Treatment: Bactrim 400-80mg tablet take one by mouth twice daily
Otc Regimen: Moisturizer
Initiate Treatment: Benzoyl peroxide 10% cleanser wash twice daily \\nDapsone 0.05% gel apply twice daily

## 2021-06-20 NOTE — PROCEDURE: MIPS QUALITY
Daily Speech Treatment Note    Today's date: 2021  Patients name: Setven Lawson  : 1945  MRN: [id number]  Safety measures: h/o CVA, HTN, COPD, aphasia  Referring provider: Eleni Burks DO    Primary diagnosis/billing code: R47 [id number]  Secondary diagnosis/billing code: [id number]    Visit tracking:  -Referring provider: Epic  -Billing guidelines: CMS  -Visit #5/10 (total visits: 11)   -Insurance: R [place] 118 due 07/10/2021    Subjective/Behavioral:  -Patient actively participated in all activities during today's session  Objective/Assessment:  -Reviewed patient's home exercises/activities completed since last appointment  Generative naming completed with 100% acc  Short-term goals:  2  Patient will name an average of 15+ items in a category in 60 seconds over 5 trials using compensatory strategies to facilitate improved word retrieval skills (to be achieved in 4-6 weeks)  -- PARTIALLY MET    3  Patient will name an average of 10+ words that begin with a specific letter in 60 seconds over 5 trials using compensatory strategies to facilitate improved word retrieval skills (to be achieved in 4-6 weeks)  -- PARTIALLY MET    4  Patient will complete concrete and abstract categorization tasks to 80% accuracy to facilitate improved generative naming skills and working memory (to be achieved in 4-6 weeks)  -- PARTIALLY MET    5  Patient will complete word generation tasks (e g , analogies, category matrices, synonyms/antonyms, etc ) with 80% accuracy using word finding strategies to facilitate improved word retrieval skills (to be achieved in 4-6 weeks)  -- PARTIALLY MET  -Word deduction (familiar phrases): Patient was provided with clue and asked to name what was being described  Patient was provided with 1-2 letters to assist with word retrieval  Task completed in  opp (83% acc) independently, increasing to  opp (100% acc) with min-mod semantic cues       6  Patient will name up to 5
Quality 226: Preventive Care And Screening: Tobacco Use: Screening And Cessation Intervention: Patient screened for tobacco use and is an ex/non-smoker
Quality 110: Preventive Care And Screening: Influenza Immunization: Influenza immunization was not ordered or administered, reason not given
features to describe a person/place/thing with 80% accuracy to facilitate improved utilization of circumlocution strategy (to be achieved in 4-6 weeks)  -- NOT ADDRESSED  -Word finding/semantic descriptors: The game Latasha Do was utilized to target word finding/circumlocution    --"Easy" cards: Patient provided appropriate clues in 8/9 opp (89% acc)  Patient guessed clue words presented by clinician in 9/9 opp (100% acc)  --"Hard" cards: Patient provided appropriate clues in 21/21 opp (100% acc)  Patient guessed clue words presented by clinician in 18/18 opp (100% acc)  *Discussed strategies to assist with word finding abilities in daily life  Patient benefited from additional processing time  Plan:  -Patient was provided with home exercises/activities to target goals in plan of care at the end of today's session   -Continue with current plan of care 
Detail Level: Detailed
Quality 130: Documentation Of Current Medications In The Medical Record: Current Medications Documented
Quality 431: Preventive Care And Screening: Unhealthy Alcohol Use - Screening: Patient screened for unhealthy alcohol use using a single question and scores less than 2 times per year

## 2021-07-06 ENCOUNTER — APPOINTMENT (RX ONLY)
Dept: URBAN - METROPOLITAN AREA CLINIC 54 | Facility: CLINIC | Age: 26
Setting detail: DERMATOLOGY
End: 2021-07-06

## 2021-07-06 ENCOUNTER — RX ONLY (OUTPATIENT)
Age: 26
Setting detail: RX ONLY
End: 2021-07-06

## 2021-07-06 DIAGNOSIS — L66.89 OTHER CICATRICIAL ALOPECIA: ICD-10-CM | Status: WELL CONTROLLED

## 2021-07-06 DIAGNOSIS — L70.0 ACNE VULGARIS: ICD-10-CM | Status: IMPROVED

## 2021-07-06 DIAGNOSIS — L66.8 OTHER CICATRICIAL ALOPECIA: ICD-10-CM | Status: WELL CONTROLLED

## 2021-07-06 PROCEDURE — ? COUNSELING

## 2021-07-06 PROCEDURE — ? PRESCRIPTION

## 2021-07-06 PROCEDURE — ? INTRALESIONAL KENALOG

## 2021-07-06 PROCEDURE — 11900 INJECT SKIN LESIONS </W 7: CPT

## 2021-07-06 PROCEDURE — 99213 OFFICE O/P EST LOW 20 MIN: CPT | Mod: 25

## 2021-07-06 PROCEDURE — ? TREATMENT REGIMEN

## 2021-07-06 RX ORDER — SULFAMETHOXAZOLE AND TRIMETHOPRIM 400; 80 MG/1; MG/1
TABLET ORAL
Qty: 60 | Refills: 0 | Status: ERX

## 2021-07-06 RX ORDER — CLOBETASOL PROPIONATE 0.46 MG/ML
SOLUTION TOPICAL
Qty: 1 | Refills: 1 | Status: ERX | COMMUNITY
Start: 2021-07-06

## 2021-07-06 RX ORDER — DAPSONE 50 MG/G
GEL TOPICAL
Qty: 1 | Refills: 3 | Status: ERX

## 2021-07-06 RX ORDER — BENZOYL PEROXIDE 100 MG/ML
LIQUID TOPICAL BID
Qty: 1 | Refills: 1 | Status: ERX

## 2021-07-06 RX ADMIN — CLOBETASOL PROPIONATE: 0.46 SOLUTION TOPICAL at 00:00

## 2021-07-06 ASSESSMENT — LOCATION DETAILED DESCRIPTION DERM
LOCATION DETAILED: SUPERIOR MID FOREHEAD
LOCATION DETAILED: RIGHT SUPERIOR PARIETAL SCALP
LOCATION DETAILED: LEFT CENTRAL MALAR CHEEK
LOCATION DETAILED: RIGHT CENTRAL MALAR CHEEK
LOCATION DETAILED: LEFT SUPERIOR PARIETAL SCALP
LOCATION DETAILED: RIGHT SUPERIOR PARIETAL SCALP
LOCATION DETAILED: RIGHT MEDIAL FRONTAL SCALP
LOCATION DETAILED: LEFT SUPERIOR PARIETAL SCALP
LOCATION DETAILED: LEFT CHIN

## 2021-07-06 ASSESSMENT — LOCATION ZONE DERM
LOCATION ZONE: SCALP
LOCATION ZONE: SCALP
LOCATION ZONE: FACE

## 2021-07-06 ASSESSMENT — LOCATION SIMPLE DESCRIPTION DERM
LOCATION SIMPLE: SCALP
LOCATION SIMPLE: SUPERIOR FOREHEAD
LOCATION SIMPLE: SCALP
LOCATION SIMPLE: CHIN
LOCATION SIMPLE: RIGHT SCALP
LOCATION SIMPLE: LEFT CHEEK
LOCATION SIMPLE: RIGHT CHEEK

## 2021-07-06 NOTE — PROCEDURE: INTRALESIONAL KENALOG
Detail Level: Detailed
Medical Necessity Clause: This procedure was medically necessary because the lesions that were treated were:
Kenalog Preparation: Kenalog
Expiration Date (Optional): SEP 2022
Consent: The risks of atrophy were reviewed with the patient.
Total Volume Injected (Ccs- Only Use Numbers And Decimals): 0.9
X Size Of Lesion In Cm (Optional): 0
Lot # (Optional): [id number]
Validate Note Data When Using Inventory: Yes
Administered By (Optional): Minna Cason
Ndc# For Kenalog Only: 2837-7781-42
Concentration Of Solution Injected (Mg/Ml): 10.0
Include Z78.9 (Other Specified Conditions Influencing Health Status) As An Associated Diagnosis?: No

## 2021-07-06 NOTE — PROCEDURE: COUNSELING
Detail Level: Detailed
Spironolactone Counseling: Patient advised regarding risks of diarrhea, abdominal pain, hyperkalemia, birth defects (for female patients), liver toxicity and renal toxicity. The patient may need blood work to monitor liver and kidney function and potassium levels while on therapy. The patient verbalized understanding of the proper use and possible adverse effects of spironolactone. All of the patient's questions and concerns were addressed.
Azithromycin Pregnancy And Lactation Text: This medication is considered safe during pregnancy and is also secreted in breast milk.
Minocycline Counseling: Patient advised regarding possible photosensitivity and discoloration of the teeth, skin, lips, tongue and gums. Patient instructed to avoid sunlight, if possible. When exposed to sunlight, patients should wear protective clothing, sunglasses, and sunscreen. The patient was instructed to call the office immediately if the following severe adverse effects occur:  hearing changes, easy bruising/bleeding, severe headache, or vision changes. The patient verbalized understanding of the proper use and possible adverse effects of minocycline. All of the patient's questions and concerns were addressed.
Benzoyl Peroxide Counseling: Patient counseled that medicine may cause skin irritation and bleach clothing. In the event of skin irritation, the patient was advised to reduce the amount of the drug applied or use it less frequently. The patient verbalized understanding of the proper use and possible adverse effects of benzoyl peroxide. All of the patient's questions and concerns were addressed.
Topical Clindamycin Pregnancy And Lactation Text: This medication is Pregnancy Category B and is considered safe during pregnancy. It is unknown if it is excreted in breast milk.
Isotretinoin Counseling: Patient should get monthly blood tests, not donate blood, not drive at night if vision affected, not share medication, and not undergo elective surgery for 6 months after tx completed. Side effects reviewed, pt to contact office should one occur.
Topical Retinoid Pregnancy And Lactation Text: This medication is Pregnancy Category C. It is unknown if this medication is excreted in breast milk.
Doxycycline Counseling:  Patient counseled regarding possible photosensitivity and increased risk for sunburn. Patient instructed to avoid sunlight, if possible. When exposed to sunlight, patients should wear protective clothing, sunglasses, and sunscreen. The patient was instructed to call the office immediately if the following severe adverse effects occur:  hearing changes, easy bruising/bleeding, severe headache, or vision changes. The patient verbalized understanding of the proper use and possible adverse effects of doxycycline. All of the patient's questions and concerns were addressed.
Dapsone Counseling: I discussed with the patient the risks of dapsone including but not limited to hemolytic anemia, agranulocytosis, rashes, methemoglobinemia, kidney failure, peripheral neuropathy, headaches, GI upset, and liver toxicity. Patients who start dapsone require monitoring including baseline LFTs and weekly CBCs for the first month, then every month thereafter. The patient verbalized understanding of the proper use and possible adverse effects of dapsone. All of the patient's questions and concerns were addressed.
Spironolactone Pregnancy And Lactation Text: This medication can cause feminization of the male fetus and should be avoided during pregnancy. The active metabolite is also found in breast milk.
Bactrim Counseling:  I discussed with the patient the risks of sulfa antibiotics including but not limited to GI upset, allergic reaction, drug rash, diarrhea, dizziness, photosensitivity, and yeast infections. Rarely, more serious reactions can occur including but not limited to aplastic anemia, agranulocytosis, methemoglobinemia, blood dyscrasias, liver or kidney failure, lung infiltrates or desquamative/blistering drug rashes.
Birth Control Pills Pregnancy And Lactation Text: This medication should be avoided if pregnant and for the first 30 days post-partum.
Minocycline Pregnancy And Lactation Text: This medication is Pregnancy Category D and not consider safe during pregnancy. It is also excreted in breast milk.
Topical Sulfur Applications Counseling: Topical Sulfur Counseling: Patient counseled that this medication may cause skin irritation or allergic reactions. In the event of skin irritation, the patient was advised to reduce the amount of the drug applied or use it less frequently. The patient verbalized understanding of the proper use and possible adverse effects of topical sulfur application. All of the patient's questions and concerns were addressed.
Isotretinoin Pregnancy And Lactation Text: This medication is Pregnancy Category X and is considered extremely dangerous during pregnancy. It is unknown if it is excreted in breast milk.
Tazorac Counseling:  Patient advised that medication is irritating and drying. Patient may need to apply sparingly and wash off after an hour before eventually leaving it on overnight. The patient verbalized understanding of the proper use and possible adverse effects of tazorac. All of the patient's questions and concerns were addressed.
Doxycycline Pregnancy And Lactation Text: This medication is Pregnancy Category D and not consider safe during pregnancy. It is also excreted in breast milk but is considered safe for shorter treatment courses.
Bactrim Pregnancy And Lactation Text: This medication is Pregnancy Category D and is known to cause fetal risk. It is also excreted in breast milk.
Sarecycline Counseling: Patient advised regarding possible photosensitivity and discoloration of the teeth, skin, lips, tongue and gums. Patient instructed to avoid sunlight, if possible. When exposed to sunlight, patients should wear protective clothing, sunglasses, and sunscreen. The patient was instructed to call the office immediately if the following severe adverse effects occur:  hearing changes, easy bruising/bleeding, severe headache, or vision changes. The patient verbalized understanding of the proper use and possible adverse effects of sarecycline. All of the patient's questions and concerns were addressed.
Topical Sulfur Applications Pregnancy And Lactation Text: This medication is Pregnancy Category C and has an unknown safety profile during pregnancy. It is unknown if this topical medication is excreted in breast milk.
Topical Retinoid counseling:  Patient advised to apply a pea-sized amount only at bedtime and wait 30 minutes after washing their face before applying. If too drying, patient may add a non-comedogenic moisturizer. The patient verbalized understanding of the proper use and possible adverse effects of retinoids. All of the patient's questions and concerns were addressed.
Tetracycline Counseling: Patient counseled regarding possible photosensitivity and increased risk for sunburn. Patient instructed to avoid sunlight, if possible. When exposed to sunlight, patients should wear protective clothing, sunglasses, and sunscreen. The patient was instructed to call the office immediately if the following severe adverse effects occur:  hearing changes, easy bruising/bleeding, severe headache, or vision changes. The patient verbalized understanding of the proper use and possible adverse effects of tetracycline. All of the patient's questions and concerns were addressed. Patient understands to avoid pregnancy while on therapy due to potential birth defects.
High Dose Vitamin A Counseling: Side effects reviewed, pt to contact office should one occur.
Use Enhanced Medication Counseling?: No
Tazorac Pregnancy And Lactation Text: This medication is not safe during pregnancy. It is unknown if this medication is excreted in breast milk.
Erythromycin Counseling:  I discussed with the patient the risks of erythromycin including but not limited to GI upset, allergic reaction, drug rash, diarrhea, increase in liver enzymes, and yeast infections.
Benzoyl Peroxide Pregnancy And Lactation Text: This medication is Pregnancy Category C. It is unknown if benzoyl peroxide is excreted in breast milk.
Birth Control Pills Counseling: Birth Control Pill Counseling: I discussed with the patient the potential side effects of OCPs including but not limited to increased risk of stroke, heart attack, thrombophlebitis, deep venous thrombosis, hepatic adenomas, breast changes, GI upset, headaches, and depression. The patient verbalized understanding of the proper use and possible adverse effects of OCPs. All of the patient's questions and concerns were addressed.
Azithromycin Counseling:  I discussed with the patient the risks of azithromycin including but not limited to GI upset, allergic reaction, drug rash, diarrhea, and yeast infections.
Dapsone Pregnancy And Lactation Text: This medication is Pregnancy Category C and is not considered safe during pregnancy or breast feeding.
High Dose Vitamin A Pregnancy And Lactation Text: High dose vitamin A therapy is contraindicated during pregnancy and breast feeding.
Topical Clindamycin Counseling: Patient counseled that this medication may cause skin irritation or allergic reactions. In the event of skin irritation, the patient was advised to reduce the amount of the drug applied or use it less frequently. The patient verbalized understanding of the proper use and possible adverse effects of clindamycin. All of the patient's questions and concerns were addressed.
Erythromycin Pregnancy And Lactation Text: This medication is Pregnancy Category B and is considered safe during pregnancy. It is also excreted in breast milk.

## 2021-07-06 NOTE — PROCEDURE: TREATMENT REGIMEN
Detail Level: Zone
Continue Regimen: Bactrim 400-80mg tablet take one by mouth twice daily
Initiate Treatment: Clobetasol solution apply to affected area on scalp at night time
Continue Regimen: Benzoyl peroxide 10% wash face twice a day \\nDapsone 0.05% apply to affected area on face once daily

## 2021-08-10 ENCOUNTER — RX ONLY (OUTPATIENT)
Age: 26
Setting detail: RX ONLY
End: 2021-08-10

## 2021-08-10 ENCOUNTER — APPOINTMENT (RX ONLY)
Dept: URBAN - METROPOLITAN AREA CLINIC 54 | Facility: CLINIC | Age: 26
Setting detail: DERMATOLOGY
End: 2021-08-10

## 2021-08-10 DIAGNOSIS — L66.8 OTHER CICATRICIAL ALOPECIA: ICD-10-CM | Status: IMPROVED

## 2021-08-10 DIAGNOSIS — L70.0 ACNE VULGARIS: ICD-10-CM | Status: IMPROVED

## 2021-08-10 DIAGNOSIS — L66.89 OTHER CICATRICIAL ALOPECIA: ICD-10-CM | Status: IMPROVED

## 2021-08-10 PROCEDURE — ? COUNSELING

## 2021-08-10 PROCEDURE — 99213 OFFICE O/P EST LOW 20 MIN: CPT

## 2021-08-10 PROCEDURE — ? TREATMENT REGIMEN

## 2021-08-10 RX ORDER — SULFAMETHOXAZOLE AND TRIMETHOPRIM 400; 80 MG/1; MG/1
TABLET ORAL
Qty: 60 | Refills: 1 | Status: ERX | COMMUNITY
Start: 2021-08-10

## 2021-08-10 RX ORDER — BENZOYL PEROXIDE 100 MG/ML
LIQUID TOPICAL BID
Qty: 1 | Refills: 1 | Status: ERX | COMMUNITY
Start: 2021-08-10

## 2021-08-10 ASSESSMENT — LOCATION SIMPLE DESCRIPTION DERM
LOCATION SIMPLE: LEFT CHEEK
LOCATION SIMPLE: RIGHT CHEEK
LOCATION SIMPLE: RIGHT SCALP
LOCATION SIMPLE: SUPERIOR FOREHEAD
LOCATION SIMPLE: CHIN
LOCATION SIMPLE: SCALP

## 2021-08-10 ASSESSMENT — LOCATION DETAILED DESCRIPTION DERM
LOCATION DETAILED: LEFT CHIN
LOCATION DETAILED: RIGHT CENTRAL MALAR CHEEK
LOCATION DETAILED: RIGHT SUPERIOR PARIETAL SCALP
LOCATION DETAILED: LEFT SUPERIOR PARIETAL SCALP
LOCATION DETAILED: SUPERIOR MID FOREHEAD
LOCATION DETAILED: RIGHT MEDIAL FRONTAL SCALP
LOCATION DETAILED: LEFT CENTRAL MALAR CHEEK

## 2021-08-10 ASSESSMENT — LOCATION ZONE DERM
LOCATION ZONE: SCALP
LOCATION ZONE: FACE

## 2021-08-10 NOTE — PROCEDURE: TREATMENT REGIMEN
Detail Level: Zone
Continue Regimen: Bactrim 400-80mg tablet take one by mouth twice daily\\nClobetasol solution apply to affected area on scalp at night time
Continue Regimen: Benzoyl peroxide 10% wash face twice a day \\nDapsone 0.05% apply to affected area on face once daily

## 2021-10-12 ENCOUNTER — RX ONLY (OUTPATIENT)
Age: 26
Setting detail: RX ONLY
End: 2021-10-12

## 2021-10-12 ENCOUNTER — APPOINTMENT (RX ONLY)
Dept: URBAN - METROPOLITAN AREA CLINIC 54 | Facility: CLINIC | Age: 26
Setting detail: DERMATOLOGY
End: 2021-10-12

## 2021-10-12 DIAGNOSIS — L70.0 ACNE VULGARIS: ICD-10-CM | Status: IMPROVED

## 2021-10-12 DIAGNOSIS — L66.89 OTHER CICATRICIAL ALOPECIA: ICD-10-CM | Status: IMPROVED

## 2021-10-12 DIAGNOSIS — L66.8 OTHER CICATRICIAL ALOPECIA: ICD-10-CM | Status: IMPROVED

## 2021-10-12 PROCEDURE — ? TREATMENT REGIMEN

## 2021-10-12 PROCEDURE — 11900 INJECT SKIN LESIONS </W 7: CPT

## 2021-10-12 PROCEDURE — 99213 OFFICE O/P EST LOW 20 MIN: CPT | Mod: 25

## 2021-10-12 PROCEDURE — ? COUNSELING

## 2021-10-12 PROCEDURE — ? INTRALESIONAL KENALOG

## 2021-10-12 RX ORDER — CLOBETASOL PROPIONATE 0.5 MG/ML
SOLUTION TOPICAL QHS
Qty: 50 | Refills: 0 | Status: ERX | COMMUNITY
Start: 2021-10-12

## 2021-10-12 RX ORDER — DAPSONE 50 MG/G
GEL TOPICAL QD
Qty: 90 | Refills: 0 | Status: ERX | COMMUNITY
Start: 2021-10-12

## 2021-10-12 RX ORDER — SULFAMETHOXAZOLE AND TRIMETHOPRIM 400; 80 MG/1; MG/1
TABLET ORAL BID
Qty: 60 | Refills: 0 | Status: ERX

## 2021-10-12 RX ORDER — CLINDAMYCIN PHOSPHATE 10 MG/ML
SOLUTION TOPICAL BID
Qty: 60 | Refills: 0 | Status: ERX | COMMUNITY
Start: 2021-10-12

## 2021-10-12 RX ORDER — BENZOYL PEROXIDE 100 MG/ML
LIQUID TOPICAL BID
Qty: 237 | Refills: 0 | Status: ERX

## 2021-10-12 ASSESSMENT — LOCATION DETAILED DESCRIPTION DERM
LOCATION DETAILED: LEFT CHIN
LOCATION DETAILED: RIGHT SUPERIOR PARIETAL SCALP
LOCATION DETAILED: RIGHT OCCIPITAL SCALP
LOCATION DETAILED: LEFT CENTRAL MALAR CHEEK
LOCATION DETAILED: RIGHT MEDIAL FRONTAL SCALP
LOCATION DETAILED: RIGHT SUPERIOR OCCIPITAL SCALP
LOCATION DETAILED: MID-OCCIPITAL SCALP
LOCATION DETAILED: LEFT LATERAL FOREHEAD
LOCATION DETAILED: SUPERIOR MID FOREHEAD
LOCATION DETAILED: LEFT SUPERIOR PARIETAL SCALP
LOCATION DETAILED: RIGHT CENTRAL MALAR CHEEK

## 2021-10-12 ASSESSMENT — LOCATION ZONE DERM
LOCATION ZONE: SCALP
LOCATION ZONE: FACE
LOCATION ZONE: FACE
LOCATION ZONE: SCALP

## 2021-10-12 ASSESSMENT — LOCATION SIMPLE DESCRIPTION DERM
LOCATION SIMPLE: LEFT FOREHEAD
LOCATION SIMPLE: RIGHT SCALP
LOCATION SIMPLE: POSTERIOR SCALP
LOCATION SIMPLE: LEFT CHEEK
LOCATION SIMPLE: CHIN
LOCATION SIMPLE: RIGHT CHEEK
LOCATION SIMPLE: SUPERIOR FOREHEAD
LOCATION SIMPLE: SCALP

## 2021-10-12 NOTE — PROCEDURE: MIPS QUALITY
Quality 110: Preventive Care And Screening: Influenza Immunization: Influenza immunization was not ordered or administered, reason not given
Quality 226: Preventive Care And Screening: Tobacco Use: Screening And Cessation Intervention: Patient screened for tobacco use and is an ex/non-smoker
Detail Level: Detailed
Quality 130: Documentation Of Current Medications In The Medical Record: Current Medications Documented
Quality 431: Preventive Care And Screening: Unhealthy Alcohol Use - Screening: Patient identified as an unhealthy alcohol user when screened for unhealthy alcohol use using a systematic screening method and received brief counseling

## 2021-10-12 NOTE — PROCEDURE: TREATMENT REGIMEN
Detail Level: Zone
Continue Regimen: Bactrim 400-80mg tablet take one by mouth twice daily\\nClobetasol 0.05% solution apply to affected area on scalp at night time
Initiate Treatment: Clindamycin 1% solution apply to affected area on face twice daily
Continue Regimen: Benzoyl peroxide 10% wash face twice a day \\nDapsone 5% gel apply to affected area on face once daily

## 2021-10-12 NOTE — PROCEDURE: INTRALESIONAL KENALOG
Total Volume Injected (Ccs- Only Use Numbers And Decimals): 0.05
X Size Of Lesion In Cm (Optional): 0
Detail Level: Detailed
Consent: The risks of atrophy were reviewed with the patient.
Concentration Of Solution Injected (Mg/Ml): 10.0
Lot # (Optional): [id number]
Treatment Number (Optional): 4
Medical Necessity Clause: This procedure was medically necessary because the lesions that were treated were:
Kenalog Preparation: Kenalog
Ndc# For Kenalog Only: 9499-5297-27
Administered By (Optional): Afia Rapp
Expiration Date (Optional): 01/2023
Validate Note Data When Using Inventory: Yes
Include Z78.9 (Other Specified Conditions Influencing Health Status) As An Associated Diagnosis?: No

## 2021-11-15 ENCOUNTER — APPOINTMENT (RX ONLY)
Dept: URBAN - METROPOLITAN AREA CLINIC 54 | Facility: CLINIC | Age: 26
Setting detail: DERMATOLOGY
End: 2021-11-15

## 2021-11-15 ENCOUNTER — RX ONLY (OUTPATIENT)
Age: 26
Setting detail: RX ONLY
End: 2021-11-15

## 2021-11-15 DIAGNOSIS — L66.8 OTHER CICATRICIAL ALOPECIA: ICD-10-CM | Status: WELL CONTROLLED

## 2021-11-15 DIAGNOSIS — L70.0 ACNE VULGARIS: ICD-10-CM

## 2021-11-15 DIAGNOSIS — L66.89 OTHER CICATRICIAL ALOPECIA: ICD-10-CM | Status: WELL CONTROLLED

## 2021-11-15 PROCEDURE — ? TREATMENT REGIMEN

## 2021-11-15 PROCEDURE — 99213 OFFICE O/P EST LOW 20 MIN: CPT

## 2021-11-15 PROCEDURE — ? COUNSELING

## 2021-11-15 PROCEDURE — ? PRESCRIPTION

## 2021-11-15 RX ORDER — SULFAMETHOXAZOLE AND TRIMETHOPRIM 400; 80 MG/1; MG/1
TABLET ORAL BID
Qty: 60 | Refills: 2 | Status: ERX

## 2021-11-15 RX ORDER — TAZAROTENE 0.05 MG/G
CREAM CUTANEOUS QHS
Qty: 60 | Refills: 1 | Status: ERX | COMMUNITY
Start: 2021-11-15

## 2021-11-15 RX ADMIN — TAZAROTENE: 0.05 CREAM CUTANEOUS at 00:00

## 2021-11-15 ASSESSMENT — LOCATION DETAILED DESCRIPTION DERM
LOCATION DETAILED: RIGHT SUPERIOR PARIETAL SCALP
LOCATION DETAILED: LEFT INFERIOR CENTRAL MALAR CHEEK
LOCATION DETAILED: RIGHT INFERIOR CENTRAL MALAR CHEEK
LOCATION DETAILED: RIGHT MEDIAL FRONTAL SCALP
LOCATION DETAILED: LEFT SUPERIOR PARIETAL SCALP

## 2021-11-15 ASSESSMENT — LOCATION SIMPLE DESCRIPTION DERM
LOCATION SIMPLE: RIGHT CHEEK
LOCATION SIMPLE: SCALP
LOCATION SIMPLE: LEFT CHEEK
LOCATION SIMPLE: RIGHT SCALP

## 2021-11-15 ASSESSMENT — LOCATION ZONE DERM
LOCATION ZONE: FACE
LOCATION ZONE: SCALP

## 2021-11-15 NOTE — PROCEDURE: COUNSELING
Bactrim Counseling:  I discussed with the patient the risks of sulfa antibiotics including but not limited to GI upset, allergic reaction, drug rash, diarrhea, dizziness, photosensitivity, and yeast infections. Rarely, more serious reactions can occur including but not limited to aplastic anemia, agranulocytosis, methemoglobinemia, blood dyscrasias, liver or kidney failure, lung infiltrates or desquamative/blistering drug rashes.
Topical Sulfur Applications Pregnancy And Lactation Text: This medication is Pregnancy Category C and has an unknown safety profile during pregnancy. It is unknown if this topical medication is excreted in breast milk.
Dapsone Counseling: I discussed with the patient the risks of dapsone including but not limited to hemolytic anemia, agranulocytosis, rashes, methemoglobinemia, kidney failure, peripheral neuropathy, headaches, GI upset, and liver toxicity. Patients who start dapsone require monitoring including baseline LFTs and weekly CBCs for the first month, then every month thereafter. The patient verbalized understanding of the proper use and possible adverse effects of dapsone. All of the patient's questions and concerns were addressed.
Tazorac Pregnancy And Lactation Text: This medication is not safe during pregnancy. It is unknown if this medication is excreted in breast milk.
Benzoyl Peroxide Pregnancy And Lactation Text: This medication is Pregnancy Category C. It is unknown if benzoyl peroxide is excreted in breast milk.
Isotretinoin Pregnancy And Lactation Text: This medication is Pregnancy Category X and is considered extremely dangerous during pregnancy. It is unknown if it is excreted in breast milk.
Spironolactone Pregnancy And Lactation Text: This medication can cause feminization of the male fetus and should be avoided during pregnancy. The active metabolite is also found in breast milk.
Minocycline Pregnancy And Lactation Text: This medication is Pregnancy Category D and not consider safe during pregnancy. It is also excreted in breast milk.
Birth Control Pills Pregnancy And Lactation Text: This medication should be avoided if pregnant and for the first 30 days post-partum.
Doxycycline Pregnancy And Lactation Text: This medication is Pregnancy Category D and not consider safe during pregnancy. It is also excreted in breast milk but is considered safe for shorter treatment courses.
Use Enhanced Medication Counseling?: No
Bactrim Pregnancy And Lactation Text: This medication is Pregnancy Category D and is known to cause fetal risk. It is also excreted in breast milk.
Tetracycline Counseling: Patient counseled regarding possible photosensitivity and increased risk for sunburn. Patient instructed to avoid sunlight, if possible. When exposed to sunlight, patients should wear protective clothing, sunglasses, and sunscreen. The patient was instructed to call the office immediately if the following severe adverse effects occur:  hearing changes, easy bruising/bleeding, severe headache, or vision changes. The patient verbalized understanding of the proper use and possible adverse effects of tetracycline. All of the patient's questions and concerns were addressed. Patient understands to avoid pregnancy while on therapy due to potential birth defects.
Topical Clindamycin Counseling: Patient counseled that this medication may cause skin irritation or allergic reactions. In the event of skin irritation, the patient was advised to reduce the amount of the drug applied or use it less frequently. The patient verbalized understanding of the proper use and possible adverse effects of clindamycin. All of the patient's questions and concerns were addressed.
Topical Retinoid counseling:  Patient advised to apply a pea-sized amount only at bedtime and wait 30 minutes after washing their face before applying. If too drying, patient may add a non-comedogenic moisturizer. The patient verbalized understanding of the proper use and possible adverse effects of retinoids. All of the patient's questions and concerns were addressed.
High Dose Vitamin A Counseling: Side effects reviewed, pt to contact office should one occur.
Erythromycin Counseling:  I discussed with the patient the risks of erythromycin including but not limited to GI upset, allergic reaction, drug rash, diarrhea, increase in liver enzymes, and yeast infections.
Sarecycline Counseling: Patient advised regarding possible photosensitivity and discoloration of the teeth, skin, lips, tongue and gums. Patient instructed to avoid sunlight, if possible. When exposed to sunlight, patients should wear protective clothing, sunglasses, and sunscreen. The patient was instructed to call the office immediately if the following severe adverse effects occur:  hearing changes, easy bruising/bleeding, severe headache, or vision changes. The patient verbalized understanding of the proper use and possible adverse effects of sarecycline. All of the patient's questions and concerns were addressed.
Azithromycin Counseling:  I discussed with the patient the risks of azithromycin including but not limited to GI upset, allergic reaction, drug rash, diarrhea, and yeast infections.
Doxycycline Counseling:  Patient counseled regarding possible photosensitivity and increased risk for sunburn. Patient instructed to avoid sunlight, if possible. When exposed to sunlight, patients should wear protective clothing, sunglasses, and sunscreen. The patient was instructed to call the office immediately if the following severe adverse effects occur:  hearing changes, easy bruising/bleeding, severe headache, or vision changes. The patient verbalized understanding of the proper use and possible adverse effects of doxycycline. All of the patient's questions and concerns were addressed.
Birth Control Pills Counseling: Birth Control Pill Counseling: I discussed with the patient the potential side effects of OCPs including but not limited to increased risk of stroke, heart attack, thrombophlebitis, deep venous thrombosis, hepatic adenomas, breast changes, GI upset, headaches, and depression. The patient verbalized understanding of the proper use and possible adverse effects of OCPs. All of the patient's questions and concerns were addressed.
Topical Clindamycin Pregnancy And Lactation Text: This medication is Pregnancy Category B and is considered safe during pregnancy. It is unknown if it is excreted in breast milk.
Topical Retinoid Pregnancy And Lactation Text: This medication is Pregnancy Category C. It is unknown if this medication is excreted in breast milk.
High Dose Vitamin A Pregnancy And Lactation Text: High dose vitamin A therapy is contraindicated during pregnancy and breast feeding.
Erythromycin Pregnancy And Lactation Text: This medication is Pregnancy Category B and is considered safe during pregnancy. It is also excreted in breast milk.
Dapsone Pregnancy And Lactation Text: This medication is Pregnancy Category C and is not considered safe during pregnancy or breast feeding.
Topical Sulfur Applications Counseling: Topical Sulfur Counseling: Patient counseled that this medication may cause skin irritation or allergic reactions. In the event of skin irritation, the patient was advised to reduce the amount of the drug applied or use it less frequently. The patient verbalized understanding of the proper use and possible adverse effects of topical sulfur application. All of the patient's questions and concerns were addressed.
Azithromycin Pregnancy And Lactation Text: This medication is considered safe during pregnancy and is also secreted in breast milk.
Benzoyl Peroxide Counseling: Patient counseled that medicine may cause skin irritation and bleach clothing. In the event of skin irritation, the patient was advised to reduce the amount of the drug applied or use it less frequently. The patient verbalized understanding of the proper use and possible adverse effects of benzoyl peroxide. All of the patient's questions and concerns were addressed.
Tazorac Counseling:  Patient advised that medication is irritating and drying. Patient may need to apply sparingly and wash off after an hour before eventually leaving it on overnight. The patient verbalized understanding of the proper use and possible adverse effects of tazorac. All of the patient's questions and concerns were addressed.
Spironolactone Counseling: Patient advised regarding risks of diarrhea, abdominal pain, hyperkalemia, birth defects (for female patients), liver toxicity and renal toxicity. The patient may need blood work to monitor liver and kidney function and potassium levels while on therapy. The patient verbalized understanding of the proper use and possible adverse effects of spironolactone. All of the patient's questions and concerns were addressed.
Isotretinoin Counseling: Patient should get monthly blood tests, not donate blood, not drive at night if vision affected, not share medication, and not undergo elective surgery for 6 months after tx completed. Side effects reviewed, pt to contact office should one occur.
Detail Level: Detailed
Minocycline Counseling: Patient advised regarding possible photosensitivity and discoloration of the teeth, skin, lips, tongue and gums. Patient instructed to avoid sunlight, if possible. When exposed to sunlight, patients should wear protective clothing, sunglasses, and sunscreen. The patient was instructed to call the office immediately if the following severe adverse effects occur:  hearing changes, easy bruising/bleeding, severe headache, or vision changes. The patient verbalized understanding of the proper use and possible adverse effects of minocycline. All of the patient's questions and concerns were addressed.

## 2021-11-15 NOTE — PROCEDURE: TREATMENT REGIMEN
Continue Regimen: Clindamycin 1% apply to affected are a twice a day \\nBenzoyl peroxide 10% wash face twice a day\\nDapsone 5% apply to affected area on face
Action 3: Continue
Hide Aquaphor Products: No
Detail Level: Zone
Start Regimen: Tazorac 0.05 % topical cream Apply a pea sized amount to affected area at night time
Continue Regimen: Bactrim 400-80mg tablet take one by mouth twice daily\\nClobetasol 0.05% solution apply to affected area on scalp at night time

## 2022-02-21 ENCOUNTER — RX ONLY (OUTPATIENT)
Age: 27
Setting detail: RX ONLY
End: 2022-02-21

## 2022-02-21 ENCOUNTER — APPOINTMENT (RX ONLY)
Dept: URBAN - METROPOLITAN AREA CLINIC 54 | Facility: CLINIC | Age: 27
Setting detail: DERMATOLOGY
End: 2022-02-21

## 2022-02-21 DIAGNOSIS — L66.89 OTHER CICATRICIAL ALOPECIA: ICD-10-CM | Status: IMPROVED

## 2022-02-21 DIAGNOSIS — L66.8 OTHER CICATRICIAL ALOPECIA: ICD-10-CM | Status: IMPROVED

## 2022-02-21 DIAGNOSIS — L70.0 ACNE VULGARIS: ICD-10-CM | Status: IMPROVED

## 2022-02-21 PROCEDURE — ? TREATMENT REGIMEN

## 2022-02-21 PROCEDURE — 99213 OFFICE O/P EST LOW 20 MIN: CPT

## 2022-02-21 PROCEDURE — ? COUNSELING

## 2022-02-21 RX ORDER — BENZOYL PEROXIDE 100 MG/ML
LIQUID TOPICAL BID
Qty: 237 | Refills: 0 | Status: ERX

## 2022-02-21 RX ORDER — CLINDAMYCIN PHOSPHATE 10 MG/ML
SOLUTION TOPICAL BID
Qty: 60 | Refills: 0 | Status: ERX

## 2022-02-21 RX ORDER — TAZAROTENE 0.05 MG/G
CREAM CUTANEOUS QHS
Qty: 60 | Refills: 0 | Status: ERX

## 2022-02-21 RX ORDER — SULFAMETHOXAZOLE AND TRIMETHOPRIM 400; 80 MG/1; MG/1
TABLET ORAL BID
Qty: 60 | Refills: 0 | Status: ERX

## 2022-02-21 RX ORDER — CLOBETASOL PROPIONATE 0.5 MG/ML
SOLUTION TOPICAL QHS
Qty: 50 | Refills: 0 | Status: ERX

## 2022-02-21 RX ORDER — DAPSONE 50 MG/G
GEL TOPICAL QD
Qty: 90 | Refills: 0 | Status: ERX

## 2022-02-21 ASSESSMENT — LOCATION DETAILED DESCRIPTION DERM
LOCATION DETAILED: RIGHT SUPERIOR PARIETAL SCALP
LOCATION DETAILED: LEFT INFERIOR CENTRAL MALAR CHEEK
LOCATION DETAILED: RIGHT INFERIOR CENTRAL MALAR CHEEK
LOCATION DETAILED: LEFT SUPERIOR PARIETAL SCALP
LOCATION DETAILED: RIGHT MEDIAL FRONTAL SCALP

## 2022-02-21 ASSESSMENT — LOCATION SIMPLE DESCRIPTION DERM
LOCATION SIMPLE: RIGHT SCALP
LOCATION SIMPLE: RIGHT CHEEK
LOCATION SIMPLE: LEFT CHEEK
LOCATION SIMPLE: SCALP

## 2022-02-21 ASSESSMENT — LOCATION ZONE DERM
LOCATION ZONE: FACE
LOCATION ZONE: SCALP

## 2022-02-21 NOTE — PROCEDURE: TREATMENT REGIMEN
Continue Regimen: Clindamycin 1% apply to affected are a twice a day \\nBenzoyl peroxide 10% wash face twice a day\\nDapsone 5% apply to affected area on face\\nTazorac 0.05 % topical cream Apply a pea sized amount to affected area at night time
Action 3: Continue
Hide Aquaphor Products: No
Detail Level: Zone
Continue Regimen: Bactrim 400-80mg tablet take one by mouth twice daily\\nClobetasol 0.05% solution apply to affected area on scalp at night time

## 2022-03-28 ENCOUNTER — APPOINTMENT (RX ONLY)
Dept: URBAN - METROPOLITAN AREA CLINIC 54 | Facility: CLINIC | Age: 27
Setting detail: DERMATOLOGY
End: 2022-03-28

## 2022-03-28 DIAGNOSIS — L66.89 OTHER CICATRICIAL ALOPECIA: ICD-10-CM | Status: IMPROVED

## 2022-03-28 DIAGNOSIS — L70.0 ACNE VULGARIS: ICD-10-CM | Status: IMPROVED

## 2022-03-28 DIAGNOSIS — L66.8 OTHER CICATRICIAL ALOPECIA: ICD-10-CM | Status: IMPROVED

## 2022-03-28 PROCEDURE — 99213 OFFICE O/P EST LOW 20 MIN: CPT

## 2022-03-28 PROCEDURE — ? COUNSELING

## 2022-03-28 PROCEDURE — ? TREATMENT REGIMEN

## 2022-03-28 ASSESSMENT — LOCATION DETAILED DESCRIPTION DERM
LOCATION DETAILED: RIGHT INFERIOR CENTRAL MALAR CHEEK
LOCATION DETAILED: LEFT SUPERIOR PARIETAL SCALP
LOCATION DETAILED: RIGHT SUPERIOR PARIETAL SCALP
LOCATION DETAILED: RIGHT MEDIAL FRONTAL SCALP
LOCATION DETAILED: LEFT INFERIOR CENTRAL MALAR CHEEK

## 2022-03-28 ASSESSMENT — LOCATION SIMPLE DESCRIPTION DERM
LOCATION SIMPLE: RIGHT SCALP
LOCATION SIMPLE: RIGHT CHEEK
LOCATION SIMPLE: SCALP
LOCATION SIMPLE: LEFT CHEEK

## 2022-03-28 ASSESSMENT — LOCATION ZONE DERM
LOCATION ZONE: SCALP
LOCATION ZONE: FACE

## 2022-03-28 NOTE — PROCEDURE: TREATMENT REGIMEN
Continue Regimen: Clindamycin 1% apply to affected are a twice a day \\nBenzoyl peroxide 10% wash face twice a day\\nDapsone 5% apply to affected area on face\\nTazorac 0.05 % topical cream Apply a pea sized amount to affected area at night time
Action 3: Continue
Hide Aquaphor Products: No
Detail Level: Zone
Samples Given: Cetaphil oily wash
Continue Regimen: Bactrim 400-80mg tablet take one by mouth twice daily\\nClobetasol 0.05% solution apply to affected area on scalp at night time

## 2022-03-29 ENCOUNTER — RX ONLY (OUTPATIENT)
Age: 27
Setting detail: RX ONLY
End: 2022-03-29

## 2022-03-29 RX ORDER — DAPSONE 50 MG/G
GEL TOPICAL QD
Qty: 90 | Refills: 0 | Status: ERX

## 2022-03-29 RX ORDER — CLINDAMYCIN PHOSPHATE 10 MG/ML
SOLUTION TOPICAL BID
Qty: 60 | Refills: 0 | Status: ERX

## 2022-03-29 RX ORDER — SULFAMETHOXAZOLE AND TRIMETHOPRIM 400; 80 MG/1; MG/1
TABLET ORAL BID
Qty: 60 | Refills: 0 | Status: ERX

## 2022-03-29 RX ORDER — BENZOYL PEROXIDE 100 MG/ML
LIQUID TOPICAL BID
Qty: 237 | Refills: 0 | Status: ERX

## 2022-03-29 RX ORDER — TAZAROTENE 0.05 MG/G
CREAM CUTANEOUS QHS
Qty: 60 | Refills: 0 | Status: ERX

## 2022-03-29 RX ORDER — CLOBETASOL PROPIONATE 0.5 MG/ML
SOLUTION TOPICAL QHS
Qty: 50 | Refills: 0 | Status: ERX

## 2022-05-04 ENCOUNTER — RX ONLY (OUTPATIENT)
Age: 27
Setting detail: RX ONLY
End: 2022-05-04

## 2022-05-04 ENCOUNTER — APPOINTMENT (RX ONLY)
Dept: URBAN - METROPOLITAN AREA CLINIC 54 | Facility: CLINIC | Age: 27
Setting detail: DERMATOLOGY
End: 2022-05-04

## 2022-05-04 DIAGNOSIS — L66.89 OTHER CICATRICIAL ALOPECIA: ICD-10-CM | Status: IMPROVED

## 2022-05-04 DIAGNOSIS — L70.0 ACNE VULGARIS: ICD-10-CM | Status: IMPROVED

## 2022-05-04 DIAGNOSIS — L66.8 OTHER CICATRICIAL ALOPECIA: ICD-10-CM | Status: IMPROVED

## 2022-05-04 PROCEDURE — ? COUNSELING

## 2022-05-04 PROCEDURE — 99213 OFFICE O/P EST LOW 20 MIN: CPT

## 2022-05-04 PROCEDURE — ? TREATMENT REGIMEN

## 2022-05-04 RX ORDER — DAPSONE 50 MG/G
GEL TOPICAL QD
Qty: 90 | Refills: 0 | Status: ERX

## 2022-05-04 RX ORDER — TAZAROTENE 0.05 MG/G
CREAM CUTANEOUS QHS
Qty: 60 | Refills: 0 | Status: ERX

## 2022-05-04 RX ORDER — BENZOYL PEROXIDE 100 MG/ML
LIQUID TOPICAL BID
Qty: 237 | Refills: 0 | Status: ERX

## 2022-05-04 RX ORDER — CLINDAMYCIN PHOSPHATE 10 MG/ML
SOLUTION TOPICAL BID
Qty: 60 | Refills: 0 | Status: ERX

## 2022-05-04 RX ORDER — SULFAMETHOXAZOLE AND TRIMETHOPRIM 400; 80 MG/1; MG/1
TABLET ORAL BID
Qty: 60 | Refills: 0 | Status: ERX

## 2022-05-04 RX ORDER — CLOBETASOL PROPIONATE 0.5 MG/ML
SOLUTION TOPICAL QHS
Qty: 50 | Refills: 0 | Status: ERX

## 2022-05-04 ASSESSMENT — LOCATION SIMPLE DESCRIPTION DERM
LOCATION SIMPLE: RIGHT CHEEK
LOCATION SIMPLE: LEFT CHEEK
LOCATION SIMPLE: RIGHT SCALP
LOCATION SIMPLE: SCALP

## 2022-05-04 ASSESSMENT — LOCATION DETAILED DESCRIPTION DERM
LOCATION DETAILED: RIGHT MEDIAL FRONTAL SCALP
LOCATION DETAILED: LEFT INFERIOR CENTRAL MALAR CHEEK
LOCATION DETAILED: RIGHT INFERIOR CENTRAL MALAR CHEEK
LOCATION DETAILED: LEFT SUPERIOR PARIETAL SCALP
LOCATION DETAILED: RIGHT SUPERIOR PARIETAL SCALP

## 2022-05-04 ASSESSMENT — LOCATION ZONE DERM
LOCATION ZONE: FACE
LOCATION ZONE: SCALP

## 2022-06-13 ENCOUNTER — RX ONLY (OUTPATIENT)
Age: 27
Setting detail: RX ONLY
End: 2022-06-13

## 2022-06-13 RX ORDER — CLINDAMYCIN PHOSPHATE 10 MG/ML
SOLUTION TOPICAL BID
Qty: 60 | Refills: 0 | Status: ERX

## 2022-06-13 RX ORDER — CLOBETASOL PROPIONATE 0.5 MG/ML
SOLUTION TOPICAL QHS
Qty: 50 | Refills: 0 | Status: ERX

## 2022-06-13 RX ORDER — DAPSONE 50 MG/G
GEL TOPICAL QD
Qty: 90 | Refills: 0 | Status: ERX

## 2022-06-13 RX ORDER — SULFAMETHOXAZOLE AND TRIMETHOPRIM 400; 80 MG/1; MG/1
TABLET ORAL BID
Qty: 60 | Refills: 0 | Status: ERX

## 2022-06-13 RX ORDER — BENZOYL PEROXIDE 100 MG/ML
LIQUID TOPICAL BID
Qty: 237 | Refills: 0 | Status: ERX

## 2022-06-13 RX ORDER — TAZAROTENE 0.05 MG/G
CREAM CUTANEOUS QHS
Qty: 60 | Refills: 0 | Status: ERX

## 2022-06-14 ENCOUNTER — APPOINTMENT (RX ONLY)
Dept: URBAN - METROPOLITAN AREA CLINIC 54 | Facility: CLINIC | Age: 27
Setting detail: DERMATOLOGY
End: 2022-06-14

## 2022-06-14 DIAGNOSIS — L70.0 ACNE VULGARIS: ICD-10-CM | Status: IMPROVED

## 2022-06-14 DIAGNOSIS — L66.8 OTHER CICATRICIAL ALOPECIA: ICD-10-CM | Status: IMPROVED

## 2022-06-14 DIAGNOSIS — L66.89 OTHER CICATRICIAL ALOPECIA: ICD-10-CM | Status: IMPROVED

## 2022-06-14 PROCEDURE — ? TREATMENT REGIMEN

## 2022-06-14 PROCEDURE — ? COUNSELING

## 2022-06-14 PROCEDURE — 99213 OFFICE O/P EST LOW 20 MIN: CPT

## 2022-06-14 ASSESSMENT — LOCATION SIMPLE DESCRIPTION DERM
LOCATION SIMPLE: SCALP
LOCATION SIMPLE: INFERIOR FOREHEAD
LOCATION SIMPLE: RIGHT SCALP
LOCATION SIMPLE: LEFT CHEEK
LOCATION SIMPLE: RIGHT CHEEK

## 2022-06-14 ASSESSMENT — LOCATION DETAILED DESCRIPTION DERM
LOCATION DETAILED: LEFT CENTRAL MALAR CHEEK
LOCATION DETAILED: INFERIOR MID FOREHEAD
LOCATION DETAILED: RIGHT INFERIOR MEDIAL MALAR CHEEK
LOCATION DETAILED: RIGHT MEDIAL FRONTAL SCALP
LOCATION DETAILED: RIGHT SUPERIOR PARIETAL SCALP
LOCATION DETAILED: LEFT SUPERIOR PARIETAL SCALP

## 2022-06-14 ASSESSMENT — LOCATION ZONE DERM
LOCATION ZONE: SCALP
LOCATION ZONE: FACE

## 2022-06-14 NOTE — PROCEDURE: TREATMENT REGIMEN
Action 4: Continue
Hide Vanicream Products: No
Continue Regimen: Clindamycin 1% apply to affected area twice a day \\nBenzoyl peroxide 10% wash face twice a day \\nDapsone 5% apply to affected area on face \\nTazorac 0.05% topical cream apply a pea size amount to affected area at night time
Detail Level: Zone
Continue Regimen: Bactrim 400-80mg tablet take one by mouth twice daily\\nClobetasol 0.05% solution apply to affected area on scalp at night time

## 2022-06-14 NOTE — PROCEDURE: COUNSELING
Tazorac Counseling:  Patient advised that medication is irritating and drying. Patient may need to apply sparingly and wash off after an hour before eventually leaving it on overnight. The patient verbalized understanding of the proper use and possible adverse effects of tazorac. All of the patient's questions and concerns were addressed.
Minocycline Pregnancy And Lactation Text: This medication is Pregnancy Category D and not consider safe during pregnancy. It is also excreted in breast milk.
Topical Sulfur Applications Counseling: Topical Sulfur Counseling: Patient counseled that this medication may cause skin irritation or allergic reactions. In the event of skin irritation, the patient was advised to reduce the amount of the drug applied or use it less frequently. The patient verbalized understanding of the proper use and possible adverse effects of topical sulfur application. All of the patient's questions and concerns were addressed.
Detail Level: Detailed
Dapsone Counseling: I discussed with the patient the risks of dapsone including but not limited to hemolytic anemia, agranulocytosis, rashes, methemoglobinemia, kidney failure, peripheral neuropathy, headaches, GI upset, and liver toxicity. Patients who start dapsone require monitoring including baseline LFTs and weekly CBCs for the first month, then every month thereafter. The patient verbalized understanding of the proper use and possible adverse effects of dapsone. All of the patient's questions and concerns were addressed.
Aklief Pregnancy And Lactation Text: It is unknown if this medication is safe to use during pregnancy. It is unknown if this medication is excreted in breast milk. Breastfeeding women should use the topical cream on the smallest area of the skin for the shortest time needed while breastfeeding. Do not apply to nipple and areola.
Erythromycin Counseling:  I discussed with the patient the risks of erythromycin including but not limited to GI upset, allergic reaction, drug rash, diarrhea, increase in liver enzymes, and yeast infections.
Bactrim Counseling:  I discussed with the patient the risks of sulfa antibiotics including but not limited to GI upset, allergic reaction, drug rash, diarrhea, dizziness, photosensitivity, and yeast infections. Rarely, more serious reactions can occur including but not limited to aplastic anemia, agranulocytosis, methemoglobinemia, blood dyscrasias, liver or kidney failure, lung infiltrates or desquamative/blistering drug rashes.
Spironolactone Pregnancy And Lactation Text: This medication can cause feminization of the male fetus and should be avoided during pregnancy. The active metabolite is also found in breast milk.
Benzoyl Peroxide Pregnancy And Lactation Text: This medication is Pregnancy Category C. It is unknown if benzoyl peroxide is excreted in breast milk.
Include Pregnancy/Lactation Warning?: No
Tazorac Pregnancy And Lactation Text: This medication is not safe during pregnancy. It is unknown if this medication is excreted in breast milk.
High Dose Vitamin A Counseling: Side effects reviewed, pt to contact office should one occur.
Tetracycline Counseling: Patient counseled regarding possible photosensitivity and increased risk for sunburn. Patient instructed to avoid sunlight, if possible. When exposed to sunlight, patients should wear protective clothing, sunglasses, and sunscreen. The patient was instructed to call the office immediately if the following severe adverse effects occur:  hearing changes, easy bruising/bleeding, severe headache, or vision changes. The patient verbalized understanding of the proper use and possible adverse effects of tetracycline. All of the patient's questions and concerns were addressed. Patient understands to avoid pregnancy while on therapy due to potential birth defects.
Azelaic Acid Counseling: Patient counseled that medicine may cause skin irritation and to avoid applying near the eyes. In the event of skin irritation, the patient was advised to reduce the amount of the drug applied or use it less frequently. The patient verbalized understanding of the proper use and possible adverse effects of azelaic acid. All of the patient's questions and concerns were addressed.
Sarecycline Counseling: Patient advised regarding possible photosensitivity and discoloration of the teeth, skin, lips, tongue and gums. Patient instructed to avoid sunlight, if possible. When exposed to sunlight, patients should wear protective clothing, sunglasses, and sunscreen. The patient was instructed to call the office immediately if the following severe adverse effects occur:  hearing changes, easy bruising/bleeding, severe headache, or vision changes. The patient verbalized understanding of the proper use and possible adverse effects of sarecycline. All of the patient's questions and concerns were addressed.
Topical Sulfur Applications Pregnancy And Lactation Text: This medication is Pregnancy Category C and has an unknown safety profile during pregnancy. It is unknown if this topical medication is excreted in breast milk.
Bactrim Pregnancy And Lactation Text: This medication is Pregnancy Category D and is known to cause fetal risk. It is also excreted in breast milk.
Erythromycin Pregnancy And Lactation Text: This medication is Pregnancy Category B and is considered safe during pregnancy. It is also excreted in breast milk.
Topical Retinoid counseling:  Patient advised to apply a pea-sized amount only at bedtime and wait 30 minutes after washing their face before applying. If too drying, patient may add a non-comedogenic moisturizer. The patient verbalized understanding of the proper use and possible adverse effects of retinoids. All of the patient's questions and concerns were addressed.
Dapsone Pregnancy And Lactation Text: This medication is Pregnancy Category C and is not considered safe during pregnancy or breast feeding.
Winlevi Counseling:  I discussed with the patient the risks of topical clascoterone including but not limited to erythema, scaling, itching, and stinging. Patient voiced their understanding.
High Dose Vitamin A Pregnancy And Lactation Text: High dose vitamin A therapy is contraindicated during pregnancy and breast feeding.
Topical Clindamycin Counseling: Patient counseled that this medication may cause skin irritation or allergic reactions. In the event of skin irritation, the patient was advised to reduce the amount of the drug applied or use it less frequently. The patient verbalized understanding of the proper use and possible adverse effects of clindamycin. All of the patient's questions and concerns were addressed.
Azithromycin Counseling:  I discussed with the patient the risks of azithromycin including but not limited to GI upset, allergic reaction, drug rash, diarrhea, and yeast infections.
Doxycycline Counseling:  Patient counseled regarding possible photosensitivity and increased risk for sunburn. Patient instructed to avoid sunlight, if possible. When exposed to sunlight, patients should wear protective clothing, sunglasses, and sunscreen. The patient was instructed to call the office immediately if the following severe adverse effects occur:  hearing changes, easy bruising/bleeding, severe headache, or vision changes. The patient verbalized understanding of the proper use and possible adverse effects of doxycycline. All of the patient's questions and concerns were addressed.
Azelaic Acid Pregnancy And Lactation Text: This medication is considered safe during pregnancy and breast feeding.
Birth Control Pills Counseling: Birth Control Pill Counseling: I discussed with the patient the potential side effects of OCPs including but not limited to increased risk of stroke, heart attack, thrombophlebitis, deep venous thrombosis, hepatic adenomas, breast changes, GI upset, headaches, and depression. The patient verbalized understanding of the proper use and possible adverse effects of OCPs. All of the patient's questions and concerns were addressed.
Topical Clindamycin Pregnancy And Lactation Text: This medication is Pregnancy Category B and is considered safe during pregnancy. It is unknown if it is excreted in breast milk.
Topical Retinoid Pregnancy And Lactation Text: This medication is Pregnancy Category C. It is unknown if this medication is excreted in breast milk.
Isotretinoin Counseling: Patient should get monthly blood tests, not donate blood, not drive at night if vision affected, not share medication, and not undergo elective surgery for 6 months after tx completed. Side effects reviewed, pt to contact office should one occur.
Winlevi Pregnancy And Lactation Text: This medication is considered safe during pregnancy and breastfeeding.
Aklief counseling:  Patient advised to apply a pea-sized amount only at bedtime and wait 30 minutes after washing their face before applying. If too drying, patient may add a non-comedogenic moisturizer. The most commonly reported side effects including irritation, redness, scaling, dryness, stinging, burning, itching, and increased risk of sunburn. The patient verbalized understanding of the proper use and possible adverse effects of retinoids. All of the patient's questions and concerns were addressed.
Minocycline Counseling: Patient advised regarding possible photosensitivity and discoloration of the teeth, skin, lips, tongue and gums. Patient instructed to avoid sunlight, if possible. When exposed to sunlight, patients should wear protective clothing, sunglasses, and sunscreen. The patient was instructed to call the office immediately if the following severe adverse effects occur:  hearing changes, easy bruising/bleeding, severe headache, or vision changes. The patient verbalized understanding of the proper use and possible adverse effects of minocycline. All of the patient's questions and concerns were addressed.
Azithromycin Pregnancy And Lactation Text: This medication is considered safe during pregnancy and is also secreted in breast milk.
Spironolactone Counseling: Patient advised regarding risks of diarrhea, abdominal pain, hyperkalemia, birth defects (for female patients), liver toxicity and renal toxicity. The patient may need blood work to monitor liver and kidney function and potassium levels while on therapy. The patient verbalized understanding of the proper use and possible adverse effects of spironolactone. All of the patient's questions and concerns were addressed.
Doxycycline Pregnancy And Lactation Text: This medication is Pregnancy Category D and not consider safe during pregnancy. It is also excreted in breast milk but is considered safe for shorter treatment courses.
Benzoyl Peroxide Counseling: Patient counseled that medicine may cause skin irritation and bleach clothing. In the event of skin irritation, the patient was advised to reduce the amount of the drug applied or use it less frequently. The patient verbalized understanding of the proper use and possible adverse effects of benzoyl peroxide. All of the patient's questions and concerns were addressed.
Isotretinoin Pregnancy And Lactation Text: This medication is Pregnancy Category X and is considered extremely dangerous during pregnancy. It is unknown if it is excreted in breast milk.
Birth Control Pills Pregnancy And Lactation Text: This medication should be avoided if pregnant and for the first 30 days post-partum.

## 2022-07-11 ENCOUNTER — RX ONLY (OUTPATIENT)
Age: 27
Setting detail: RX ONLY
End: 2022-07-11

## 2022-07-11 ENCOUNTER — APPOINTMENT (RX ONLY)
Dept: URBAN - METROPOLITAN AREA CLINIC 54 | Facility: CLINIC | Age: 27
Setting detail: DERMATOLOGY
End: 2022-07-11

## 2022-07-11 DIAGNOSIS — L70.0 ACNE VULGARIS: ICD-10-CM | Status: IMPROVED

## 2022-07-11 DIAGNOSIS — L66.89 OTHER CICATRICIAL ALOPECIA: ICD-10-CM | Status: IMPROVED

## 2022-07-11 DIAGNOSIS — L66.8 OTHER CICATRICIAL ALOPECIA: ICD-10-CM | Status: IMPROVED

## 2022-07-11 PROCEDURE — 99213 OFFICE O/P EST LOW 20 MIN: CPT

## 2022-07-11 PROCEDURE — ? TREATMENT REGIMEN

## 2022-07-11 PROCEDURE — ? COUNSELING

## 2022-07-11 RX ORDER — CLINDAMYCIN PHOSPHATE 10 MG/ML
SOLUTION TOPICAL BID
Qty: 60 | Refills: 0 | Status: ERX

## 2022-07-11 RX ORDER — TAZAROTENE 0.05 MG/G
CREAM CUTANEOUS QHS
Qty: 60 | Refills: 0 | Status: ERX

## 2022-07-11 RX ORDER — CLOBETASOL PROPIONATE 0.5 MG/ML
SOLUTION TOPICAL QHS
Qty: 50 | Refills: 0 | Status: ERX

## 2022-07-11 RX ORDER — DAPSONE 50 MG/G
GEL TOPICAL QD
Qty: 90 | Refills: 0 | Status: ERX

## 2022-07-11 RX ORDER — TAZAROTENE 0.05 MG/G
CREAM CUTANEOUS QHS
Qty: 60 | Refills: 0 | Status: CANCELLED

## 2022-07-11 RX ORDER — SULFAMETHOXAZOLE AND TRIMETHOPRIM 400; 80 MG/1; MG/1
TABLET ORAL BID
Qty: 60 | Refills: 0 | Status: ERX

## 2022-07-11 RX ORDER — CLINDAMYCIN PHOSPHATE 10 MG/ML
SOLUTION TOPICAL BID
Qty: 60 | Refills: 0 | Status: CANCELLED

## 2022-07-11 RX ORDER — SULFAMETHOXAZOLE AND TRIMETHOPRIM 400; 80 MG/1; MG/1
TABLET ORAL BID
Qty: 60 | Refills: 0 | Status: CANCELLED

## 2022-07-11 RX ORDER — BENZOYL PEROXIDE 100 MG/ML
LIQUID TOPICAL BID
Qty: 237 | Refills: 0 | Status: ERX

## 2022-07-11 RX ORDER — BENZOYL PEROXIDE 100 MG/ML
LIQUID TOPICAL BID
Qty: 237 | Refills: 0 | Status: CANCELLED

## 2022-07-11 RX ORDER — DAPSONE 50 MG/G
GEL TOPICAL QD
Qty: 90 | Refills: 0 | Status: CANCELLED

## 2022-07-11 RX ORDER — CLOBETASOL PROPIONATE 0.5 MG/ML
SOLUTION TOPICAL QHS
Qty: 50 | Refills: 0 | Status: CANCELLED

## 2022-07-11 ASSESSMENT — LOCATION DETAILED DESCRIPTION DERM
LOCATION DETAILED: RIGHT SUPERIOR PARIETAL SCALP
LOCATION DETAILED: LEFT SUPERIOR PARIETAL SCALP
LOCATION DETAILED: RIGHT INFERIOR MEDIAL MALAR CHEEK
LOCATION DETAILED: RIGHT MEDIAL FRONTAL SCALP
LOCATION DETAILED: LEFT CENTRAL MALAR CHEEK
LOCATION DETAILED: INFERIOR MID FOREHEAD

## 2022-07-11 ASSESSMENT — LOCATION ZONE DERM
LOCATION ZONE: FACE
LOCATION ZONE: SCALP

## 2022-07-11 ASSESSMENT — LOCATION SIMPLE DESCRIPTION DERM
LOCATION SIMPLE: RIGHT CHEEK
LOCATION SIMPLE: LEFT CHEEK
LOCATION SIMPLE: INFERIOR FOREHEAD
LOCATION SIMPLE: SCALP
LOCATION SIMPLE: RIGHT SCALP

## 2022-08-15 ENCOUNTER — APPOINTMENT (RX ONLY)
Dept: URBAN - METROPOLITAN AREA CLINIC 54 | Facility: CLINIC | Age: 27
Setting detail: DERMATOLOGY
End: 2022-08-15

## 2022-08-15 DIAGNOSIS — L70.0 ACNE VULGARIS: ICD-10-CM | Status: STABLE

## 2022-08-15 DIAGNOSIS — L66.89 OTHER CICATRICIAL ALOPECIA: ICD-10-CM | Status: IMPROVED

## 2022-08-15 DIAGNOSIS — L66.8 OTHER CICATRICIAL ALOPECIA: ICD-10-CM | Status: IMPROVED

## 2022-08-15 PROCEDURE — ? COUNSELING

## 2022-08-15 PROCEDURE — 99213 OFFICE O/P EST LOW 20 MIN: CPT

## 2022-08-15 PROCEDURE — ? TREATMENT REGIMEN

## 2022-08-15 ASSESSMENT — LOCATION SIMPLE DESCRIPTION DERM
LOCATION SIMPLE: RIGHT SCALP
LOCATION SIMPLE: RIGHT CHEEK
LOCATION SIMPLE: INFERIOR FOREHEAD
LOCATION SIMPLE: SCALP
LOCATION SIMPLE: LEFT CHEEK

## 2022-08-15 ASSESSMENT — LOCATION DETAILED DESCRIPTION DERM
LOCATION DETAILED: RIGHT INFERIOR MEDIAL MALAR CHEEK
LOCATION DETAILED: INFERIOR MID FOREHEAD
LOCATION DETAILED: LEFT CENTRAL MALAR CHEEK
LOCATION DETAILED: RIGHT SUPERIOR PARIETAL SCALP
LOCATION DETAILED: LEFT SUPERIOR PARIETAL SCALP
LOCATION DETAILED: RIGHT MEDIAL FRONTAL SCALP

## 2022-08-15 ASSESSMENT — LOCATION ZONE DERM
LOCATION ZONE: SCALP
LOCATION ZONE: FACE

## 2022-08-15 NOTE — PROCEDURE: TREATMENT REGIMEN
Action 4: Continue
Other Instructions: Patient was instructed to change diet for less sugar and less dairy intake.
Hide Vanicream Products: No
Continue Regimen: Clindamycin 1% apply to affected area twice a day \\nBenzoyl peroxide 10% wash face twice a day \\nDapsone 5% apply to affected area on face \\nTazorac 0.05% topical cream apply a pea size amount to affected area at night time
Detail Level: Zone
Continue Regimen: Bactrim 400-80mg tablet take one by mouth twice daily\\nClobetasol 0.05% solution apply to affected area on scalp at night time

## 2022-08-16 ENCOUNTER — RX ONLY (OUTPATIENT)
Age: 27
Setting detail: RX ONLY
End: 2022-08-16

## 2022-08-16 RX ORDER — CLINDAMYCIN PHOSPHATE 10 MG/ML
SOLUTION TOPICAL BID
Qty: 60 | Refills: 0 | Status: ERX

## 2022-08-16 RX ORDER — CLINDAMYCIN PHOSPHATE 10 MG/ML
SOLUTION TOPICAL BID
Qty: 60 | Refills: 0 | Status: CANCELLED

## 2022-08-16 RX ORDER — CLOBETASOL PROPIONATE 0.5 MG/ML
SOLUTION TOPICAL QHS
Qty: 50 | Refills: 0 | Status: CANCELLED

## 2022-08-16 RX ORDER — CLOBETASOL PROPIONATE 0.5 MG/ML
SOLUTION TOPICAL QHS
Qty: 50 | Refills: 0 | Status: ERX

## 2022-09-22 ENCOUNTER — RX ONLY (OUTPATIENT)
Age: 27
Setting detail: RX ONLY
End: 2022-09-22

## 2022-09-22 ENCOUNTER — APPOINTMENT (RX ONLY)
Dept: URBAN - METROPOLITAN AREA CLINIC 54 | Facility: CLINIC | Age: 27
Setting detail: DERMATOLOGY
End: 2022-09-22

## 2022-09-22 DIAGNOSIS — L66.8 OTHER CICATRICIAL ALOPECIA: ICD-10-CM | Status: WELL CONTROLLED

## 2022-09-22 DIAGNOSIS — L66.89 OTHER CICATRICIAL ALOPECIA: ICD-10-CM | Status: WELL CONTROLLED

## 2022-09-22 DIAGNOSIS — L70.0 ACNE VULGARIS: ICD-10-CM | Status: WELL CONTROLLED

## 2022-09-22 PROCEDURE — ? TREATMENT REGIMEN

## 2022-09-22 PROCEDURE — 99213 OFFICE O/P EST LOW 20 MIN: CPT

## 2022-09-22 PROCEDURE — ? COUNSELING

## 2022-09-22 RX ORDER — CLOBETASOL PROPIONATE 0.5 MG/ML
SOLUTION TOPICAL QHS
Qty: 50 | Refills: 0 | Status: ERX

## 2022-09-22 RX ORDER — DAPSONE 50 MG/G
GEL TOPICAL QD
Qty: 90 | Refills: 0 | Status: CANCELLED

## 2022-09-22 RX ORDER — BENZOYL PEROXIDE 100 MG/ML
LIQUID TOPICAL BID
Qty: 237 | Refills: 0 | Status: ERX

## 2022-09-22 RX ORDER — CLINDAMYCIN PHOSPHATE 10 MG/ML
SOLUTION TOPICAL BID
Qty: 60 | Refills: 0 | Status: ERX

## 2022-09-22 RX ORDER — CLOBETASOL PROPIONATE 0.5 MG/ML
SOLUTION TOPICAL QHS
Qty: 50 | Refills: 0 | Status: CANCELLED

## 2022-09-22 RX ORDER — CLINDAMYCIN PHOSPHATE 10 MG/ML
SOLUTION TOPICAL BID
Qty: 60 | Refills: 0 | Status: CANCELLED

## 2022-09-22 RX ORDER — SULFAMETHOXAZOLE AND TRIMETHOPRIM 400; 80 MG/1; MG/1
TABLET ORAL BID
Qty: 60 | Refills: 0 | Status: ERX

## 2022-09-22 RX ORDER — TAZAROTENE 0.05 MG/G
CREAM CUTANEOUS QHS
Qty: 60 | Refills: 0 | Status: CANCELLED

## 2022-09-22 RX ORDER — TAZAROTENE 0.05 MG/G
CREAM CUTANEOUS QHS
Qty: 60 | Refills: 0 | Status: ERX

## 2022-09-22 RX ORDER — DAPSONE 50 MG/G
GEL TOPICAL QD
Qty: 90 | Refills: 0 | Status: ERX

## 2022-09-22 RX ORDER — BENZOYL PEROXIDE 100 MG/ML
LIQUID TOPICAL BID
Qty: 237 | Refills: 0 | Status: CANCELLED

## 2022-09-22 RX ORDER — SULFAMETHOXAZOLE AND TRIMETHOPRIM 400; 80 MG/1; MG/1
TABLET ORAL BID
Qty: 60 | Refills: 0 | Status: CANCELLED

## 2022-09-22 ASSESSMENT — LOCATION DETAILED DESCRIPTION DERM
LOCATION DETAILED: RIGHT MEDIAL FRONTAL SCALP
LOCATION DETAILED: RIGHT SUPERIOR PARIETAL SCALP
LOCATION DETAILED: RIGHT INFERIOR MEDIAL MALAR CHEEK
LOCATION DETAILED: INFERIOR MID FOREHEAD
LOCATION DETAILED: LEFT SUPERIOR PARIETAL SCALP
LOCATION DETAILED: LEFT CENTRAL MALAR CHEEK

## 2022-09-22 ASSESSMENT — LOCATION SIMPLE DESCRIPTION DERM
LOCATION SIMPLE: RIGHT SCALP
LOCATION SIMPLE: INFERIOR FOREHEAD
LOCATION SIMPLE: LEFT CHEEK
LOCATION SIMPLE: SCALP
LOCATION SIMPLE: RIGHT CHEEK

## 2022-09-22 ASSESSMENT — LOCATION ZONE DERM
LOCATION ZONE: FACE
LOCATION ZONE: SCALP

## 2022-10-24 ENCOUNTER — RX ONLY (OUTPATIENT)
Age: 27
Setting detail: RX ONLY
End: 2022-10-24

## 2022-10-24 RX ORDER — CLINDAMYCIN PHOSPHATE 10 MG/ML
SOLUTION TOPICAL BID
Qty: 60 | Refills: 0 | Status: CANCELLED

## 2022-10-24 RX ORDER — DAPSONE 50 MG/G
GEL TOPICAL QD
Qty: 90 | Refills: 0 | Status: CANCELLED

## 2022-10-24 RX ORDER — BENZOYL PEROXIDE 100 MG/ML
LIQUID TOPICAL BID
Qty: 237 | Refills: 0 | Status: CANCELLED

## 2022-10-24 RX ORDER — SULFAMETHOXAZOLE AND TRIMETHOPRIM 400; 80 MG/1; MG/1
TABLET ORAL BID
Qty: 60 | Refills: 0 | Status: CANCELLED

## 2022-10-24 RX ORDER — CLOBETASOL PROPIONATE 0.5 MG/ML
SOLUTION TOPICAL QHS
Qty: 50 | Refills: 0 | Status: CANCELLED

## 2022-10-25 ENCOUNTER — RX ONLY (OUTPATIENT)
Age: 27
Setting detail: RX ONLY
End: 2022-10-25

## 2022-10-25 ENCOUNTER — APPOINTMENT (RX ONLY)
Dept: URBAN - METROPOLITAN AREA CLINIC 54 | Facility: CLINIC | Age: 27
Setting detail: DERMATOLOGY
End: 2022-10-25

## 2022-10-25 DIAGNOSIS — L66.8 OTHER CICATRICIAL ALOPECIA: ICD-10-CM | Status: WELL CONTROLLED

## 2022-10-25 DIAGNOSIS — L66.89 OTHER CICATRICIAL ALOPECIA: ICD-10-CM | Status: WELL CONTROLLED

## 2022-10-25 DIAGNOSIS — L70.0 ACNE VULGARIS: ICD-10-CM | Status: IMPROVED

## 2022-10-25 PROCEDURE — ? TREATMENT REGIMEN

## 2022-10-25 PROCEDURE — ? COUNSELING

## 2022-10-25 PROCEDURE — 99213 OFFICE O/P EST LOW 20 MIN: CPT

## 2022-10-25 RX ORDER — SULFAMETHOXAZOLE AND TRIMETHOPRIM 400; 80 MG/1; MG/1
TABLET ORAL BID
Qty: 60 | Refills: 0 | Status: ERX

## 2022-10-25 RX ORDER — CLINDAMYCIN PHOSPHATE 10 MG/ML
SOLUTION TOPICAL BID
Qty: 60 | Refills: 0 | Status: ERX

## 2022-10-25 RX ORDER — BENZOYL PEROXIDE 100 MG/ML
LIQUID TOPICAL BID
Qty: 237 | Refills: 0 | Status: ERX

## 2022-10-25 RX ORDER — CLOBETASOL PROPIONATE 0.5 MG/ML
SOLUTION TOPICAL QHS
Qty: 50 | Refills: 0 | Status: ERX

## 2022-10-25 RX ORDER — DAPSONE 50 MG/G
GEL TOPICAL QD
Qty: 90 | Refills: 0 | Status: ERX

## 2022-10-25 ASSESSMENT — LOCATION DETAILED DESCRIPTION DERM
LOCATION DETAILED: RIGHT MEDIAL FRONTAL SCALP
LOCATION DETAILED: RIGHT SUPERIOR PARIETAL SCALP
LOCATION DETAILED: LEFT SUPERIOR PARIETAL SCALP
LOCATION DETAILED: INFERIOR MID FOREHEAD
LOCATION DETAILED: LEFT CENTRAL MALAR CHEEK
LOCATION DETAILED: RIGHT INFERIOR MEDIAL MALAR CHEEK

## 2022-10-25 ASSESSMENT — LOCATION SIMPLE DESCRIPTION DERM
LOCATION SIMPLE: SCALP
LOCATION SIMPLE: RIGHT SCALP
LOCATION SIMPLE: RIGHT CHEEK
LOCATION SIMPLE: INFERIOR FOREHEAD
LOCATION SIMPLE: LEFT CHEEK

## 2022-10-25 ASSESSMENT — LOCATION ZONE DERM
LOCATION ZONE: SCALP
LOCATION ZONE: FACE

## 2022-11-12 ENCOUNTER — APPOINTMENT (RX ONLY)
Dept: URBAN - METROPOLITAN AREA CLINIC 54 | Facility: CLINIC | Age: 27
Setting detail: DERMATOLOGY
End: 2022-11-12

## 2022-11-12 DIAGNOSIS — L70.0 ACNE VULGARIS: ICD-10-CM | Status: IMPROVED

## 2022-11-12 DIAGNOSIS — L66.8 OTHER CICATRICIAL ALOPECIA: ICD-10-CM

## 2022-11-12 DIAGNOSIS — L08.89 OTHER SPECIFIED LOCAL INFECTIONS OF THE SKIN AND SUBCUTANEOUS TISSUE: ICD-10-CM

## 2022-11-12 DIAGNOSIS — L66.89 OTHER CICATRICIAL ALOPECIA: ICD-10-CM

## 2022-11-12 PROCEDURE — ? OTHER

## 2022-11-12 PROCEDURE — 99213 OFFICE O/P EST LOW 20 MIN: CPT

## 2022-11-12 PROCEDURE — ? SEPARATE AND IDENTIFIABLE DOCUMENTATION

## 2022-11-12 PROCEDURE — ? COUNSELING

## 2022-11-12 ASSESSMENT — LOCATION ZONE DERM
LOCATION ZONE: SCALP
LOCATION ZONE: NOSE

## 2022-11-12 ASSESSMENT — LOCATION SIMPLE DESCRIPTION DERM
LOCATION SIMPLE: FRONTAL SCALP
LOCATION SIMPLE: RIGHT NOSE

## 2022-11-12 ASSESSMENT — LOCATION DETAILED DESCRIPTION DERM
LOCATION DETAILED: MEDIAL FRONTAL SCALP
LOCATION DETAILED: RIGHT NARIS

## 2022-11-12 NOTE — PROCEDURE: OTHER
Render Risk Assessment In Note?: no
Detail Level: Zone
Other (Free Text): Patient was advised to discontinue Bactrim 400-80 mg bid until other medication is finished.
Note Text (......Xxx Chief Complaint.): This diagnosis correlates with the
Other (Free Text): Patient was advised to continue medication course with PCP

## 2022-11-12 NOTE — PROCEDURE: COUNSELING
Detail Level: Detailed
High Dose Vitamin A Counseling: Side effects reviewed, pt to contact office should one occur.
Benzoyl Peroxide Pregnancy And Lactation Text: This medication is Pregnancy Category C. It is unknown if benzoyl peroxide is excreted in breast milk.
Topical Retinoid Pregnancy And Lactation Text: This medication is Pregnancy Category C. It is unknown if this medication is excreted in breast milk.
Minocycline Counseling: Patient advised regarding possible photosensitivity and discoloration of the teeth, skin, lips, tongue and gums. Patient instructed to avoid sunlight, if possible. When exposed to sunlight, patients should wear protective clothing, sunglasses, and sunscreen. The patient was instructed to call the office immediately if the following severe adverse effects occur:  hearing changes, easy bruising/bleeding, severe headache, or vision changes. The patient verbalized understanding of the proper use and possible adverse effects of minocycline. All of the patient's questions and concerns were addressed.
Doxycycline Counseling:  Patient counseled regarding possible photosensitivity and increased risk for sunburn. Patient instructed to avoid sunlight, if possible. When exposed to sunlight, patients should wear protective clothing, sunglasses, and sunscreen. The patient was instructed to call the office immediately if the following severe adverse effects occur:  hearing changes, easy bruising/bleeding, severe headache, or vision changes. The patient verbalized understanding of the proper use and possible adverse effects of doxycycline. All of the patient's questions and concerns were addressed.
Azelaic Acid Pregnancy And Lactation Text: This medication is considered safe during pregnancy and breast feeding.
Isotretinoin Counseling: Patient should get monthly blood tests, not donate blood, not drive at night if vision affected, not share medication, and not undergo elective surgery for 6 months after tx completed. Side effects reviewed, pt to contact office should one occur.
Dapsone Counseling: I discussed with the patient the risks of dapsone including but not limited to hemolytic anemia, agranulocytosis, rashes, methemoglobinemia, kidney failure, peripheral neuropathy, headaches, GI upset, and liver toxicity. Patients who start dapsone require monitoring including baseline LFTs and weekly CBCs for the first month, then every month thereafter. The patient verbalized understanding of the proper use and possible adverse effects of dapsone. All of the patient's questions and concerns were addressed.
Tetracycline Pregnancy And Lactation Text: This medication is Pregnancy Category D and not consider safe during pregnancy. It is also excreted in breast milk.
Winlevi Counseling:  I discussed with the patient the risks of topical clascoterone including but not limited to erythema, scaling, itching, and stinging. Patient voiced their understanding.
Aklief Pregnancy And Lactation Text: It is unknown if this medication is safe to use during pregnancy. It is unknown if this medication is excreted in breast milk. Breastfeeding women should use the topical cream on the smallest area of the skin for the shortest time needed while breastfeeding. Do not apply to nipple and areola.
Erythromycin Counseling:  I discussed with the patient the risks of erythromycin including but not limited to GI upset, allergic reaction, drug rash, diarrhea, increase in liver enzymes, and yeast infections.
Bactrim Counseling:  I discussed with the patient the risks of sulfa antibiotics including but not limited to GI upset, allergic reaction, drug rash, diarrhea, dizziness, photosensitivity, and yeast infections. Rarely, more serious reactions can occur including but not limited to aplastic anemia, agranulocytosis, methemoglobinemia, blood dyscrasias, liver or kidney failure, lung infiltrates or desquamative/blistering drug rashes.
Topical Clindamycin Counseling: Patient counseled that this medication may cause skin irritation or allergic reactions. In the event of skin irritation, the patient was advised to reduce the amount of the drug applied or use it less frequently. The patient verbalized understanding of the proper use and possible adverse effects of clindamycin. All of the patient's questions and concerns were addressed.
Spironolactone Pregnancy And Lactation Text: This medication can cause feminization of the male fetus and should be avoided during pregnancy. The active metabolite is also found in breast milk.
Include Pregnancy/Lactation Warning?: No
Topical Sulfur Applications Counseling: Topical Sulfur Counseling: Patient counseled that this medication may cause skin irritation or allergic reactions. In the event of skin irritation, the patient was advised to reduce the amount of the drug applied or use it less frequently. The patient verbalized understanding of the proper use and possible adverse effects of topical sulfur application. All of the patient's questions and concerns were addressed.
Birth Control Pills Counseling: Birth Control Pill Counseling: I discussed with the patient the potential side effects of OCPs including but not limited to increased risk of stroke, heart attack, thrombophlebitis, deep venous thrombosis, hepatic adenomas, breast changes, GI upset, headaches, and depression. The patient verbalized understanding of the proper use and possible adverse effects of OCPs. All of the patient's questions and concerns were addressed.
Isotretinoin Pregnancy And Lactation Text: This medication is Pregnancy Category X and is considered extremely dangerous during pregnancy. It is unknown if it is excreted in breast milk.
High Dose Vitamin A Pregnancy And Lactation Text: High dose vitamin A therapy is contraindicated during pregnancy and breast feeding.
Tazorac Counseling:  Patient advised that medication is irritating and drying. Patient may need to apply sparingly and wash off after an hour before eventually leaving it on overnight. The patient verbalized understanding of the proper use and possible adverse effects of tazorac. All of the patient's questions and concerns were addressed.
Azithromycin Counseling:  I discussed with the patient the risks of azithromycin including but not limited to GI upset, allergic reaction, drug rash, diarrhea, and yeast infections.
Benzoyl Peroxide Counseling: Patient counseled that medicine may cause skin irritation and bleach clothing. In the event of skin irritation, the patient was advised to reduce the amount of the drug applied or use it less frequently. The patient verbalized understanding of the proper use and possible adverse effects of benzoyl peroxide. All of the patient's questions and concerns were addressed.
Erythromycin Pregnancy And Lactation Text: This medication is Pregnancy Category B and is considered safe during pregnancy. It is also excreted in breast milk.
Topical Retinoid counseling:  Patient advised to apply a pea-sized amount only at bedtime and wait 30 minutes after washing their face before applying. If too drying, patient may add a non-comedogenic moisturizer. The patient verbalized understanding of the proper use and possible adverse effects of retinoids. All of the patient's questions and concerns were addressed.
Tetracycline Counseling: Patient counseled regarding possible photosensitivity and increased risk for sunburn. Patient instructed to avoid sunlight, if possible. When exposed to sunlight, patients should wear protective clothing, sunglasses, and sunscreen. The patient was instructed to call the office immediately if the following severe adverse effects occur:  hearing changes, easy bruising/bleeding, severe headache, or vision changes. The patient verbalized understanding of the proper use and possible adverse effects of tetracycline. All of the patient's questions and concerns were addressed. Patient understands to avoid pregnancy while on therapy due to potential birth defects.
Winlevi Pregnancy And Lactation Text: This medication is considered safe during pregnancy and breastfeeding.
Dapsone Pregnancy And Lactation Text: This medication is Pregnancy Category C and is not considered safe during pregnancy or breast feeding.
Doxycycline Pregnancy And Lactation Text: This medication is Pregnancy Category D and not consider safe during pregnancy. It is also excreted in breast milk but is considered safe for shorter treatment courses.
Aklief counseling:  Patient advised to apply a pea-sized amount only at bedtime and wait 30 minutes after washing their face before applying. If too drying, patient may add a non-comedogenic moisturizer. The most commonly reported side effects including irritation, redness, scaling, dryness, stinging, burning, itching, and increased risk of sunburn. The patient verbalized understanding of the proper use and possible adverse effects of retinoids. All of the patient's questions and concerns were addressed.
Azelaic Acid Counseling: Patient counseled that medicine may cause skin irritation and to avoid applying near the eyes. In the event of skin irritation, the patient was advised to reduce the amount of the drug applied or use it less frequently. The patient verbalized understanding of the proper use and possible adverse effects of azelaic acid. All of the patient's questions and concerns were addressed.
Sarecycline Counseling: Patient advised regarding possible photosensitivity and discoloration of the teeth, skin, lips, tongue and gums. Patient instructed to avoid sunlight, if possible. When exposed to sunlight, patients should wear protective clothing, sunglasses, and sunscreen. The patient was instructed to call the office immediately if the following severe adverse effects occur:  hearing changes, easy bruising/bleeding, severe headache, or vision changes. The patient verbalized understanding of the proper use and possible adverse effects of sarecycline. All of the patient's questions and concerns were addressed.
Azithromycin Pregnancy And Lactation Text: This medication is considered safe during pregnancy and is also secreted in breast milk.
Bactrim Pregnancy And Lactation Text: This medication is Pregnancy Category D and is known to cause fetal risk. It is also excreted in breast milk.
Topical Clindamycin Pregnancy And Lactation Text: This medication is Pregnancy Category B and is considered safe during pregnancy. It is unknown if it is excreted in breast milk.
Tazorac Pregnancy And Lactation Text: This medication is not safe during pregnancy. It is unknown if this medication is excreted in breast milk.
Birth Control Pills Pregnancy And Lactation Text: This medication should be avoided if pregnant and for the first 30 days post-partum.
Topical Sulfur Applications Pregnancy And Lactation Text: This medication is Pregnancy Category C and has an unknown safety profile during pregnancy. It is unknown if this topical medication is excreted in breast milk.
Spironolactone Counseling: Patient advised regarding risks of diarrhea, abdominal pain, hyperkalemia, birth defects (for female patients), liver toxicity and renal toxicity. The patient may need blood work to monitor liver and kidney function and potassium levels while on therapy. The patient verbalized understanding of the proper use and possible adverse effects of spironolactone. All of the patient's questions and concerns were addressed.

## 2022-12-12 ENCOUNTER — RX ONLY (OUTPATIENT)
Age: 27
Setting detail: RX ONLY
End: 2022-12-12

## 2022-12-12 RX ORDER — BENZOYL PEROXIDE 100 MG/ML
LIQUID TOPICAL BID
Qty: 237 | Refills: 0 | Status: ERX

## 2022-12-12 RX ORDER — CLINDAMYCIN PHOSPHATE 10 MG/ML
SOLUTION TOPICAL BID
Qty: 60 | Refills: 0 | Status: CANCELLED

## 2022-12-12 RX ORDER — SULFAMETHOXAZOLE AND TRIMETHOPRIM 400; 80 MG/1; MG/1
TABLET ORAL BID
Qty: 60 | Refills: 0 | Status: CANCELLED

## 2022-12-12 RX ORDER — SULFAMETHOXAZOLE AND TRIMETHOPRIM 400; 80 MG/1; MG/1
TABLET ORAL BID
Qty: 60 | Refills: 0 | Status: ERX

## 2022-12-12 RX ORDER — CLOBETASOL PROPIONATE 0.5 MG/ML
SOLUTION TOPICAL QHS
Qty: 50 | Refills: 0 | Status: CANCELLED

## 2022-12-12 RX ORDER — TAZAROTENE 0.05 MG/G
CREAM CUTANEOUS QHS
Qty: 60 | Refills: 0 | Status: CANCELLED

## 2022-12-12 RX ORDER — CLINDAMYCIN PHOSPHATE 10 MG/ML
SOLUTION TOPICAL BID
Qty: 60 | Refills: 0 | Status: ERX

## 2022-12-12 RX ORDER — DAPSONE 50 MG/G
GEL TOPICAL QD
Qty: 90 | Refills: 0 | Status: CANCELLED

## 2022-12-12 RX ORDER — DAPSONE 50 MG/G
GEL TOPICAL QD
Qty: 90 | Refills: 0 | Status: ERX

## 2022-12-12 RX ORDER — CLOBETASOL PROPIONATE 0.5 MG/ML
SOLUTION TOPICAL QHS
Qty: 50 | Refills: 0 | Status: ERX

## 2022-12-12 RX ORDER — TAZAROTENE 0.05 MG/G
CREAM CUTANEOUS QHS
Qty: 60 | Refills: 0 | Status: ERX

## 2022-12-12 RX ORDER — BENZOYL PEROXIDE 100 MG/ML
LIQUID TOPICAL BID
Qty: 237 | Refills: 0 | Status: CANCELLED

## 2022-12-13 ENCOUNTER — APPOINTMENT (RX ONLY)
Dept: URBAN - METROPOLITAN AREA CLINIC 54 | Facility: CLINIC | Age: 27
Setting detail: DERMATOLOGY
End: 2022-12-13

## 2022-12-13 DIAGNOSIS — L70.0 ACNE VULGARIS: ICD-10-CM | Status: WELL CONTROLLED

## 2022-12-13 DIAGNOSIS — L66.8 OTHER CICATRICIAL ALOPECIA: ICD-10-CM | Status: WELL CONTROLLED

## 2022-12-13 DIAGNOSIS — L66.89 OTHER CICATRICIAL ALOPECIA: ICD-10-CM | Status: WELL CONTROLLED

## 2022-12-13 PROCEDURE — ? COUNSELING

## 2022-12-13 PROCEDURE — ? TREATMENT REGIMEN

## 2022-12-13 PROCEDURE — ? OTHER

## 2022-12-13 PROCEDURE — 99213 OFFICE O/P EST LOW 20 MIN: CPT

## 2022-12-13 ASSESSMENT — LOCATION ZONE DERM
LOCATION ZONE: SCALP
LOCATION ZONE: FACE

## 2022-12-13 ASSESSMENT — LOCATION SIMPLE DESCRIPTION DERM
LOCATION SIMPLE: FRONTAL SCALP
LOCATION SIMPLE: INFERIOR FOREHEAD
LOCATION SIMPLE: LEFT CHEEK
LOCATION SIMPLE: RIGHT CHEEK

## 2022-12-13 ASSESSMENT — LOCATION DETAILED DESCRIPTION DERM
LOCATION DETAILED: LEFT CENTRAL MALAR CHEEK
LOCATION DETAILED: INFERIOR MID FOREHEAD
LOCATION DETAILED: MEDIAL FRONTAL SCALP
LOCATION DETAILED: RIGHT INFERIOR MEDIAL MALAR CHEEK

## 2022-12-13 NOTE — PROCEDURE: COUNSELING
Detail Level: Detailed
Tazorac Counseling:  Patient advised that medication is irritating and drying. Patient may need to apply sparingly and wash off after an hour before eventually leaving it on overnight. The patient verbalized understanding of the proper use and possible adverse effects of tazorac. All of the patient's questions and concerns were addressed.
Minocycline Pregnancy And Lactation Text: This medication is Pregnancy Category D and not consider safe during pregnancy. It is also excreted in breast milk.
Topical Sulfur Applications Counseling: Topical Sulfur Counseling: Patient counseled that this medication may cause skin irritation or allergic reactions. In the event of skin irritation, the patient was advised to reduce the amount of the drug applied or use it less frequently. The patient verbalized understanding of the proper use and possible adverse effects of topical sulfur application. All of the patient's questions and concerns were addressed.
Dapsone Counseling: I discussed with the patient the risks of dapsone including but not limited to hemolytic anemia, agranulocytosis, rashes, methemoglobinemia, kidney failure, peripheral neuropathy, headaches, GI upset, and liver toxicity. Patients who start dapsone require monitoring including baseline LFTs and weekly CBCs for the first month, then every month thereafter. The patient verbalized understanding of the proper use and possible adverse effects of dapsone. All of the patient's questions and concerns were addressed.
Aklief Pregnancy And Lactation Text: It is unknown if this medication is safe to use during pregnancy. It is unknown if this medication is excreted in breast milk. Breastfeeding women should use the topical cream on the smallest area of the skin for the shortest time needed while breastfeeding. Do not apply to nipple and areola.
Erythromycin Counseling:  I discussed with the patient the risks of erythromycin including but not limited to GI upset, allergic reaction, drug rash, diarrhea, increase in liver enzymes, and yeast infections.
Bactrim Counseling:  I discussed with the patient the risks of sulfa antibiotics including but not limited to GI upset, allergic reaction, drug rash, diarrhea, dizziness, photosensitivity, and yeast infections. Rarely, more serious reactions can occur including but not limited to aplastic anemia, agranulocytosis, methemoglobinemia, blood dyscrasias, liver or kidney failure, lung infiltrates or desquamative/blistering drug rashes.
Spironolactone Pregnancy And Lactation Text: This medication can cause feminization of the male fetus and should be avoided during pregnancy. The active metabolite is also found in breast milk.
Benzoyl Peroxide Pregnancy And Lactation Text: This medication is Pregnancy Category C. It is unknown if benzoyl peroxide is excreted in breast milk.
Include Pregnancy/Lactation Warning?: No
Tazorac Pregnancy And Lactation Text: This medication is not safe during pregnancy. It is unknown if this medication is excreted in breast milk.
High Dose Vitamin A Counseling: Side effects reviewed, pt to contact office should one occur.
Tetracycline Counseling: Patient counseled regarding possible photosensitivity and increased risk for sunburn. Patient instructed to avoid sunlight, if possible. When exposed to sunlight, patients should wear protective clothing, sunglasses, and sunscreen. The patient was instructed to call the office immediately if the following severe adverse effects occur:  hearing changes, easy bruising/bleeding, severe headache, or vision changes. The patient verbalized understanding of the proper use and possible adverse effects of tetracycline. All of the patient's questions and concerns were addressed. Patient understands to avoid pregnancy while on therapy due to potential birth defects.
Azelaic Acid Counseling: Patient counseled that medicine may cause skin irritation and to avoid applying near the eyes. In the event of skin irritation, the patient was advised to reduce the amount of the drug applied or use it less frequently. The patient verbalized understanding of the proper use and possible adverse effects of azelaic acid. All of the patient's questions and concerns were addressed.
Sarecycline Counseling: Patient advised regarding possible photosensitivity and discoloration of the teeth, skin, lips, tongue and gums. Patient instructed to avoid sunlight, if possible. When exposed to sunlight, patients should wear protective clothing, sunglasses, and sunscreen. The patient was instructed to call the office immediately if the following severe adverse effects occur:  hearing changes, easy bruising/bleeding, severe headache, or vision changes. The patient verbalized understanding of the proper use and possible adverse effects of sarecycline. All of the patient's questions and concerns were addressed.
Topical Sulfur Applications Pregnancy And Lactation Text: This medication is Pregnancy Category C and has an unknown safety profile during pregnancy. It is unknown if this topical medication is excreted in breast milk.
Bactrim Pregnancy And Lactation Text: This medication is Pregnancy Category D and is known to cause fetal risk. It is also excreted in breast milk.
Erythromycin Pregnancy And Lactation Text: This medication is Pregnancy Category B and is considered safe during pregnancy. It is also excreted in breast milk.
Topical Retinoid counseling:  Patient advised to apply a pea-sized amount only at bedtime and wait 30 minutes after washing their face before applying. If too drying, patient may add a non-comedogenic moisturizer. The patient verbalized understanding of the proper use and possible adverse effects of retinoids. All of the patient's questions and concerns were addressed.
Dapsone Pregnancy And Lactation Text: This medication is Pregnancy Category C and is not considered safe during pregnancy or breast feeding.
Winlevi Counseling:  I discussed with the patient the risks of topical clascoterone including but not limited to erythema, scaling, itching, and stinging. Patient voiced their understanding.
High Dose Vitamin A Pregnancy And Lactation Text: High dose vitamin A therapy is contraindicated during pregnancy and breast feeding.
Topical Clindamycin Counseling: Patient counseled that this medication may cause skin irritation or allergic reactions. In the event of skin irritation, the patient was advised to reduce the amount of the drug applied or use it less frequently. The patient verbalized understanding of the proper use and possible adverse effects of clindamycin. All of the patient's questions and concerns were addressed.
Azithromycin Counseling:  I discussed with the patient the risks of azithromycin including but not limited to GI upset, allergic reaction, drug rash, diarrhea, and yeast infections.
Doxycycline Counseling:  Patient counseled regarding possible photosensitivity and increased risk for sunburn. Patient instructed to avoid sunlight, if possible. When exposed to sunlight, patients should wear protective clothing, sunglasses, and sunscreen. The patient was instructed to call the office immediately if the following severe adverse effects occur:  hearing changes, easy bruising/bleeding, severe headache, or vision changes. The patient verbalized understanding of the proper use and possible adverse effects of doxycycline. All of the patient's questions and concerns were addressed.
Azelaic Acid Pregnancy And Lactation Text: This medication is considered safe during pregnancy and breast feeding.
Birth Control Pills Counseling: Birth Control Pill Counseling: I discussed with the patient the potential side effects of OCPs including but not limited to increased risk of stroke, heart attack, thrombophlebitis, deep venous thrombosis, hepatic adenomas, breast changes, GI upset, headaches, and depression. The patient verbalized understanding of the proper use and possible adverse effects of OCPs. All of the patient's questions and concerns were addressed.
Topical Clindamycin Pregnancy And Lactation Text: This medication is Pregnancy Category B and is considered safe during pregnancy. It is unknown if it is excreted in breast milk.
Topical Retinoid Pregnancy And Lactation Text: This medication is Pregnancy Category C. It is unknown if this medication is excreted in breast milk.
Isotretinoin Counseling: Patient should get monthly blood tests, not donate blood, not drive at night if vision affected, not share medication, and not undergo elective surgery for 6 months after tx completed. Side effects reviewed, pt to contact office should one occur.
Winlevi Pregnancy And Lactation Text: This medication is considered safe during pregnancy and breastfeeding.
Aklief counseling:  Patient advised to apply a pea-sized amount only at bedtime and wait 30 minutes after washing their face before applying. If too drying, patient may add a non-comedogenic moisturizer. The most commonly reported side effects including irritation, redness, scaling, dryness, stinging, burning, itching, and increased risk of sunburn. The patient verbalized understanding of the proper use and possible adverse effects of retinoids. All of the patient's questions and concerns were addressed.
Minocycline Counseling: Patient advised regarding possible photosensitivity and discoloration of the teeth, skin, lips, tongue and gums. Patient instructed to avoid sunlight, if possible. When exposed to sunlight, patients should wear protective clothing, sunglasses, and sunscreen. The patient was instructed to call the office immediately if the following severe adverse effects occur:  hearing changes, easy bruising/bleeding, severe headache, or vision changes. The patient verbalized understanding of the proper use and possible adverse effects of minocycline. All of the patient's questions and concerns were addressed.
Azithromycin Pregnancy And Lactation Text: This medication is considered safe during pregnancy and is also secreted in breast milk.
Spironolactone Counseling: Patient advised regarding risks of diarrhea, abdominal pain, hyperkalemia, birth defects (for female patients), liver toxicity and renal toxicity. The patient may need blood work to monitor liver and kidney function and potassium levels while on therapy. The patient verbalized understanding of the proper use and possible adverse effects of spironolactone. All of the patient's questions and concerns were addressed.
Doxycycline Pregnancy And Lactation Text: This medication is Pregnancy Category D and not consider safe during pregnancy. It is also excreted in breast milk but is considered safe for shorter treatment courses.
Benzoyl Peroxide Counseling: Patient counseled that medicine may cause skin irritation and bleach clothing. In the event of skin irritation, the patient was advised to reduce the amount of the drug applied or use it less frequently. The patient verbalized understanding of the proper use and possible adverse effects of benzoyl peroxide. All of the patient's questions and concerns were addressed.
Isotretinoin Pregnancy And Lactation Text: This medication is Pregnancy Category X and is considered extremely dangerous during pregnancy. It is unknown if it is excreted in breast milk.
Birth Control Pills Pregnancy And Lactation Text: This medication should be avoided if pregnant and for the first 30 days post-partum.

## 2022-12-13 NOTE — PROCEDURE: TREATMENT REGIMEN
Detail Level: Zone
Continue Regimen: Bactrim 400-80 mg twice a day \\nClobetasol 0.05% apply to affected area on scalp
Action 4: Continue
Hide Vanicream Products: No
Continue Regimen: Clindamycin 1% apply to affected area twice a day \\nBenzoyl peroxide 10% wash face twice a day \\nDapsone 5% apply to affected area on face \\nTazorac 0.05% topical cream apply a pea size amount to affected area at night time

## 2023-10-03 NOTE — PROCEDURE: COUNSELING
Benzoyl Peroxide Pregnancy And Lactation Text: This medication is Pregnancy Category C. It is unknown if benzoyl peroxide is excreted in breast milk. Spironolactone Counseling: Patient advised regarding risks of diarrhea, abdominal pain, hyperkalemia, birth defects (for female patients), liver toxicity and renal toxicity. The patient may need blood work to monitor liver and kidney function and potassium levels while on therapy. The patient verbalized understanding of the proper use and possible adverse effects of spironolactone.  All of the patient's questions and concerns were addressed.

## 2024-12-31 NOTE — PROCEDURE: TREATMENT REGIMEN
3
Continue Regimen: Doxycycline 100 mg tablet twice a day 30 minutes after meal
Initiate Treatment: Prednisone 20 mg tablet take 2 by mouth for 3 days then 1 tablet by mouth for 2 days
Detail Level: Zone